# Patient Record
Sex: MALE | Race: WHITE | NOT HISPANIC OR LATINO | Employment: FULL TIME | URBAN - METROPOLITAN AREA
[De-identification: names, ages, dates, MRNs, and addresses within clinical notes are randomized per-mention and may not be internally consistent; named-entity substitution may affect disease eponyms.]

---

## 2017-02-01 ENCOUNTER — PATIENT MESSAGE (OUTPATIENT)
Dept: FAMILY MEDICINE | Facility: CLINIC | Age: 47
End: 2017-02-01

## 2017-02-01 ENCOUNTER — LAB VISIT (OUTPATIENT)
Dept: LAB | Facility: HOSPITAL | Age: 47
End: 2017-02-01
Attending: NURSE PRACTITIONER
Payer: COMMERCIAL

## 2017-02-01 ENCOUNTER — OFFICE VISIT (OUTPATIENT)
Dept: FAMILY MEDICINE | Facility: CLINIC | Age: 47
End: 2017-02-01
Payer: COMMERCIAL

## 2017-02-01 VITALS
TEMPERATURE: 99 F | OXYGEN SATURATION: 98 % | DIASTOLIC BLOOD PRESSURE: 78 MMHG | WEIGHT: 259.25 LBS | SYSTOLIC BLOOD PRESSURE: 122 MMHG | HEART RATE: 91 BPM | BODY MASS INDEX: 35.11 KG/M2 | HEIGHT: 72 IN

## 2017-02-01 DIAGNOSIS — Z00.00 ANNUAL PHYSICAL EXAM: Primary | ICD-10-CM

## 2017-02-01 DIAGNOSIS — R59.0 SUBMANDIBULAR LYMPHADENOPATHY: ICD-10-CM

## 2017-02-01 DIAGNOSIS — R91.1 LUNG NODULE: ICD-10-CM

## 2017-02-01 DIAGNOSIS — Z00.00 ANNUAL PHYSICAL EXAM: ICD-10-CM

## 2017-02-01 DIAGNOSIS — Z23 NEED FOR TDAP VACCINATION: ICD-10-CM

## 2017-02-01 DIAGNOSIS — R91.8 ABNORMAL CT SCAN, LUNG: Primary | ICD-10-CM

## 2017-02-01 LAB
ALBUMIN SERPL BCP-MCNC: 4.3 G/DL
ALP SERPL-CCNC: 60 IU/L
ALT SERPL W/O P-5'-P-CCNC: 39 IU/L
ANION GAP SERPL CALC-SCNC: 10 MMOL/L
AST SERPL-CCNC: 42 IU/L
BASOPHILS # BLD AUTO: 0.1 K/UL
BASOPHILS NFR BLD: 1.6 %
BILIRUB SERPL-MCNC: 0.9 MG/DL
BUN SERPL-MCNC: 26 MG/DL
CALCIUM SERPL-MCNC: 9.3 MG/DL
CHLORIDE SERPL-SCNC: 105 MMOL/L
CHOLEST/HDLC SERPL: 3.4 {RATIO}
CO2 SERPL-SCNC: 27 MMOL/L
CREAT SERPL-MCNC: 0.86 MG/DL
DIFFERENTIAL METHOD: NORMAL
EOSINOPHIL # BLD AUTO: 0.2 K/UL
EOSINOPHIL NFR BLD: 2.8 %
ERYTHROCYTE [DISTWIDTH] IN BLOOD BY AUTOMATED COUNT: 12.9 %
EST. GFR  (AFRICAN AMERICAN): >60 ML/MIN/1.73 M^2
EST. GFR  (NON AFRICAN AMERICAN): >60 ML/MIN/1.73 M^2
GLUCOSE SERPL-MCNC: 99 MG/DL
HCT VFR BLD AUTO: 44.8 %
HDL/CHOLESTEROL RATIO: 29.3 %
HDLC SERPL-MCNC: 164 MG/DL
HDLC SERPL-MCNC: 48 MG/DL
HGB BLD-MCNC: 15 G/DL
LDLC SERPL CALC-MCNC: 98.6 MG/DL
LYMPHOCYTES # BLD AUTO: 2.1 K/UL
LYMPHOCYTES NFR BLD: 33.8 %
MCH RBC QN AUTO: 29.4 PG
MCHC RBC AUTO-ENTMCNC: 33.5 %
MCV RBC AUTO: 88 FL
MONOCYTES # BLD AUTO: 0.9 K/UL
MONOCYTES NFR BLD: 14.4 %
NEUTROPHILS # BLD AUTO: 3 K/UL
NEUTROPHILS NFR BLD: 47.1 %
NONHDLC SERPL-MCNC: 116 MG/DL
PLATELET # BLD AUTO: 325 K/UL
PMV BLD AUTO: 9.3 FL
POTASSIUM SERPL-SCNC: 4.2 MMOL/L
PROT SERPL-MCNC: 7.7 G/DL
RBC # BLD AUTO: 5.1 M/UL
SODIUM SERPL-SCNC: 142 MMOL/L
TRIGL SERPL-MCNC: 87 MG/DL
TSH SERPL DL<=0.005 MIU/L-ACNC: 1.29 UIU/ML
WBC # BLD AUTO: 6.34 K/UL

## 2017-02-01 PROCEDURE — 36415 COLL VENOUS BLD VENIPUNCTURE: CPT | Mod: PO

## 2017-02-01 PROCEDURE — 80061 LIPID PANEL: CPT

## 2017-02-01 PROCEDURE — 90715 TDAP VACCINE 7 YRS/> IM: CPT | Mod: S$GLB,,, | Performed by: NURSE PRACTITIONER

## 2017-02-01 PROCEDURE — 84443 ASSAY THYROID STIM HORMONE: CPT

## 2017-02-01 PROCEDURE — 90471 IMMUNIZATION ADMIN: CPT | Mod: S$GLB,,, | Performed by: NURSE PRACTITIONER

## 2017-02-01 PROCEDURE — 85025 COMPLETE CBC W/AUTO DIFF WBC: CPT | Mod: PO

## 2017-02-01 PROCEDURE — 80053 COMPREHEN METABOLIC PANEL: CPT | Mod: PO

## 2017-02-01 PROCEDURE — 99203 OFFICE O/P NEW LOW 30 MIN: CPT | Mod: 25,S$GLB,, | Performed by: NURSE PRACTITIONER

## 2017-02-01 RX ORDER — PHENTERMINE HYDROCHLORIDE 37.5 MG/1
TABLET ORAL
Refills: 2 | COMMUNITY
Start: 2017-01-15 | End: 2017-03-15

## 2017-02-01 RX ORDER — PREDNISONE 20 MG/1
TABLET ORAL
Refills: 0 | COMMUNITY
Start: 2016-11-28 | End: 2017-02-01 | Stop reason: ALTCHOICE

## 2017-02-01 RX ORDER — NAPROXEN 500 MG/1
500 TABLET ORAL 2 TIMES DAILY PRN
Refills: 2 | COMMUNITY
Start: 2017-01-15 | End: 2017-09-25

## 2017-02-01 RX ORDER — SILDENAFIL CITRATE 100 MG/1
100 TABLET, FILM COATED ORAL
Refills: 2 | COMMUNITY
Start: 2017-01-15 | End: 2018-08-23

## 2017-02-01 RX ORDER — OXYCODONE AND ACETAMINOPHEN 5; 325 MG/1; MG/1
TABLET ORAL
Refills: 0 | COMMUNITY
Start: 2016-11-28 | End: 2017-03-13 | Stop reason: SDUPTHER

## 2017-02-01 NOTE — PROGRESS NOTES
Subjective:       Patient ID: Frankie Hoyt is a 46 y.o. male.    Chief Complaint: Annual Exam    HPI Comments: Pt presents to the clinic for annual exam. States he is doing this because his insurance said he has to. He is currently taking Adipex to help him lose weight. He monitors his BP some what at home. He has been on it since dec 2016 and he has dropped 15lbs. He is also working out more then he was before. He is also concerned about some swelling he has on the left side of his neck. States that it has been there for a year. Reports that it does it bigger at times. Non tender. Does report history of chewing tobacco.    Review of Systems   Constitutional: Negative.    HENT: Negative.    Eyes: Negative.    Respiratory: Negative.    Cardiovascular: Negative.    Gastrointestinal: Negative.    Endocrine: Negative.    Genitourinary: Negative.    Musculoskeletal: Negative.    Skin: Negative.    Neurological: Negative.    Hematological: Positive for adenopathy.        Swelling to left side of neck     Psychiatric/Behavioral: Negative.        Objective:      Physical Exam   Constitutional: He is oriented to person, place, and time. He appears well-developed and well-nourished. No distress.   HENT:   Head: Normocephalic.   Right Ear: Tympanic membrane and external ear normal.   Left Ear: Tympanic membrane and external ear normal.   Nose: No mucosal edema or rhinorrhea. Right sinus exhibits no maxillary sinus tenderness and no frontal sinus tenderness. Left sinus exhibits no maxillary sinus tenderness and no frontal sinus tenderness.   Mouth/Throat: No oropharyngeal exudate, posterior oropharyngeal edema or posterior oropharyngeal erythema.   Eyes: Pupils are equal, round, and reactive to light.   Neck: Normal range of motion.   Cardiovascular: Normal rate, regular rhythm and normal heart sounds.    Pulmonary/Chest: Effort normal and breath sounds normal. No respiratory distress.   Abdominal: Soft. Bowel sounds are  normal. He exhibits no distension. There is no tenderness.   Musculoskeletal: Normal range of motion. He exhibits no edema.   Lymphadenopathy:        Head (left side): Submandibular adenopathy present.   Swelling to left submandibular gland non tender   Neurological: He is alert and oriented to person, place, and time.   Skin: Skin is warm and dry. He is not diaphoretic.   Psychiatric: He has a normal mood and affect. His behavior is normal. Judgment and thought content normal.       Assessment:       1. Annual physical exam    2. Need for Tdap vaccination    3. Submandibular lymphadenopathy        Plan:       Annual physical exam  -     CBC auto differential; Future  -     Comprehensive metabolic panel; Future  -     Lipid panel; Future  -     TSH; Future  -     Urinalysis; Future    Need for Tdap vaccination  -     Tdap Vaccine (Adult)    Submandibular lymphadenopathy  -     CT Soft Tissue Neck W WO Contrast; Future    CT scheduled for Friday  Patient declines flu shot

## 2017-02-03 ENCOUNTER — TELEPHONE (OUTPATIENT)
Dept: FAMILY MEDICINE | Facility: CLINIC | Age: 47
End: 2017-02-03

## 2017-02-03 ENCOUNTER — HOSPITAL ENCOUNTER (OUTPATIENT)
Dept: RADIOLOGY | Facility: HOSPITAL | Age: 47
Discharge: HOME | End: 2017-02-03
Attending: NURSE PRACTITIONER
Payer: COMMERCIAL

## 2017-02-03 DIAGNOSIS — R59.0 SUBMANDIBULAR LYMPHADENOPATHY: ICD-10-CM

## 2017-02-03 DIAGNOSIS — R91.8 ABNORMAL CT SCAN, LUNG: ICD-10-CM

## 2017-02-03 DIAGNOSIS — R91.1 LUNG NODULE: ICD-10-CM

## 2017-02-03 PROCEDURE — 70491 CT SOFT TISSUE NECK W/DYE: CPT | Mod: TC,PO

## 2017-02-03 PROCEDURE — 71260 CT THORAX DX C+: CPT | Mod: TC,PO

## 2017-02-03 PROCEDURE — 25500020 PHARM REV CODE 255: Mod: PO | Performed by: NURSE PRACTITIONER

## 2017-02-03 RX ADMIN — IOHEXOL 50 ML: 350 INJECTION, SOLUTION INTRAVENOUS at 01:02

## 2017-02-06 ENCOUNTER — PATIENT MESSAGE (OUTPATIENT)
Dept: FAMILY MEDICINE | Facility: CLINIC | Age: 47
End: 2017-02-06

## 2017-02-06 ENCOUNTER — TELEPHONE (OUTPATIENT)
Dept: FAMILY MEDICINE | Facility: CLINIC | Age: 47
End: 2017-02-06

## 2017-02-06 NOTE — TELEPHONE ENCOUNTER
Spoke with Dr Law he states that the 4mm nodule is the same nodule as the 5mm found on this CT scan he recommends repeating the CT in 2 years. Spoke with patient let him know what the radiologist said. Repeat in 2 years.

## 2017-02-06 NOTE — TELEPHONE ENCOUNTER
Called radiology spoke with Rupert stanton that Dr. Law is at the hospital asked that he please compare the CT from 2012 to the one done last week and let me know

## 2017-03-06 ENCOUNTER — TELEPHONE (OUTPATIENT)
Dept: FAMILY MEDICINE | Facility: CLINIC | Age: 47
End: 2017-03-06

## 2017-03-06 RX ORDER — AMOXICILLIN AND CLAVULANATE POTASSIUM 875; 125 MG/1; MG/1
1 TABLET, FILM COATED ORAL EVERY 12 HOURS
Qty: 20 TABLET | Refills: 0 | Status: SHIPPED | OUTPATIENT
Start: 2017-03-06 | End: 2017-09-25

## 2017-03-06 NOTE — TELEPHONE ENCOUNTER
Spoke with patient states he is having some discomfort in his lower abd. States it kind of feels like when he had diverticulitis in the past he is having diarrhea the pain is relieved once he has a BM but then returns. Let him know I am sending Augmentin to the pharmacy if the pain worsens needs to come in or go to the ED

## 2017-03-06 NOTE — TELEPHONE ENCOUNTER
----- Message from Sherry Lagos sent at 3/6/2017  1:30 PM CST -----  Patient requesting a returned phone call to discuss lower abdominal pain that he is experiencing. Started this am; not taking any OTC medication. No fever, but not severe enough to go to ER. Says it has similar feeling of diverticulitis.

## 2017-03-13 ENCOUNTER — TELEPHONE (OUTPATIENT)
Dept: UROLOGY | Facility: CLINIC | Age: 47
End: 2017-03-13

## 2017-03-13 ENCOUNTER — PATIENT MESSAGE (OUTPATIENT)
Dept: FAMILY MEDICINE | Facility: CLINIC | Age: 47
End: 2017-03-13

## 2017-03-13 DIAGNOSIS — K57.92 DIVERTICULITIS OF INTESTINE WITHOUT PERFORATION OR ABSCESS WITHOUT BLEEDING, UNSPECIFIED PART OF INTESTINAL TRACT: Primary | ICD-10-CM

## 2017-03-13 RX ORDER — OXYCODONE AND ACETAMINOPHEN 5; 325 MG/1; MG/1
1 TABLET ORAL EVERY 6 HOURS PRN
Qty: 15 TABLET | Refills: 0 | Status: SHIPPED | OUTPATIENT
Start: 2017-03-13 | End: 2018-08-23

## 2017-03-13 NOTE — TELEPHONE ENCOUNTER
----- Message from Kelly Crow sent at 3/13/2017 10:18 AM CDT -----  Contact: [phone]/ self   Pt its requesting an appointment for this week, states he is having a problem that need treatment right away. Please advise

## 2017-03-15 ENCOUNTER — LAB VISIT (OUTPATIENT)
Dept: LAB | Facility: HOSPITAL | Age: 47
End: 2017-03-15
Attending: INTERNAL MEDICINE
Payer: COMMERCIAL

## 2017-03-15 ENCOUNTER — OFFICE VISIT (OUTPATIENT)
Dept: GASTROENTEROLOGY | Facility: CLINIC | Age: 47
End: 2017-03-15
Payer: COMMERCIAL

## 2017-03-15 VITALS
DIASTOLIC BLOOD PRESSURE: 85 MMHG | WEIGHT: 256.81 LBS | SYSTOLIC BLOOD PRESSURE: 124 MMHG | BODY MASS INDEX: 34.78 KG/M2 | HEIGHT: 72 IN | HEART RATE: 72 BPM

## 2017-03-15 DIAGNOSIS — R10.32 ABDOMINAL PAIN, LLQ (LEFT LOWER QUADRANT): ICD-10-CM

## 2017-03-15 DIAGNOSIS — Z80.0 FAMILY HISTORY OF COLON CANCER REQUIRING SCREENING COLONOSCOPY: Chronic | ICD-10-CM

## 2017-03-15 DIAGNOSIS — K57.32 DIVERTICULITIS OF SIGMOID COLON: Primary | Chronic | ICD-10-CM

## 2017-03-15 DIAGNOSIS — K57.32 DIVERTICULITIS OF SIGMOID COLON: Chronic | ICD-10-CM

## 2017-03-15 LAB
ALBUMIN SERPL BCP-MCNC: 3.6 G/DL
ALP SERPL-CCNC: 68 U/L
ALT SERPL W/O P-5'-P-CCNC: 24 U/L
ANION GAP SERPL CALC-SCNC: 11 MMOL/L
AST SERPL-CCNC: 21 U/L
BASOPHILS # BLD AUTO: 0.08 K/UL
BASOPHILS NFR BLD: 1.1 %
BILIRUB SERPL-MCNC: 0.5 MG/DL
BUN SERPL-MCNC: 18 MG/DL
CALCIUM SERPL-MCNC: 9.8 MG/DL
CHLORIDE SERPL-SCNC: 101 MMOL/L
CO2 SERPL-SCNC: 28 MMOL/L
CREAT SERPL-MCNC: 1 MG/DL
DIFFERENTIAL METHOD: ABNORMAL
EOSINOPHIL # BLD AUTO: 0.2 K/UL
EOSINOPHIL NFR BLD: 2.5 %
ERYTHROCYTE [DISTWIDTH] IN BLOOD BY AUTOMATED COUNT: 12.2 %
EST. GFR  (AFRICAN AMERICAN): >60 ML/MIN/1.73 M^2
EST. GFR  (NON AFRICAN AMERICAN): >60 ML/MIN/1.73 M^2
GLUCOSE SERPL-MCNC: 98 MG/DL
HCT VFR BLD AUTO: 45.7 %
HGB BLD-MCNC: 15.4 G/DL
LYMPHOCYTES # BLD AUTO: 2.1 K/UL
LYMPHOCYTES NFR BLD: 28.8 %
MCH RBC QN AUTO: 30 PG
MCHC RBC AUTO-ENTMCNC: 33.7 %
MCV RBC AUTO: 89 FL
MONOCYTES # BLD AUTO: 0.8 K/UL
MONOCYTES NFR BLD: 11 %
NEUTROPHILS # BLD AUTO: 4 K/UL
NEUTROPHILS NFR BLD: 55.2 %
PLATELET # BLD AUTO: 374 K/UL
PMV BLD AUTO: 9.3 FL
POTASSIUM SERPL-SCNC: 4.5 MMOL/L
PROT SERPL-MCNC: 8 G/DL
RBC # BLD AUTO: 5.13 M/UL
SODIUM SERPL-SCNC: 140 MMOL/L
WBC # BLD AUTO: 7.3 K/UL

## 2017-03-15 PROCEDURE — 80053 COMPREHEN METABOLIC PANEL: CPT

## 2017-03-15 PROCEDURE — 1160F RVW MEDS BY RX/DR IN RCRD: CPT | Mod: S$GLB,,, | Performed by: INTERNAL MEDICINE

## 2017-03-15 PROCEDURE — 99205 OFFICE O/P NEW HI 60 MIN: CPT | Mod: S$GLB,,, | Performed by: INTERNAL MEDICINE

## 2017-03-15 PROCEDURE — 36415 COLL VENOUS BLD VENIPUNCTURE: CPT

## 2017-03-15 PROCEDURE — 99999 PR PBB SHADOW E&M-EST. PATIENT-LVL III: CPT | Mod: PBBFAC,,, | Performed by: INTERNAL MEDICINE

## 2017-03-15 PROCEDURE — 85025 COMPLETE CBC W/AUTO DIFF WBC: CPT

## 2017-03-15 NOTE — MR AVS SNAPSHOT
[facility]  [address]  [address]  [place] [address]  Phone: [phone]                  Frankie Hoyt   3/15/2017 9:40 AM   Office Visit    Description:  Male : 1970   Provider:  Del Fan MD   Department:  [place] [facility]           Reason for Visit     Diverticulitis           Diagnoses this Visit        Comments    Diverticulitis of sigmoid colon    -  Primary Initial episode occurred 2012    Abdominal pain, LLQ (left lower quadrant)         Family history of colon cancer requiring screening colonoscopy     Strong family history of colon cancer as well as colon polyps; and colonoscopy done 2013; follow-up needed 2018           To Do List           Future Appointments        Provider Department Dept Phone    3/15/2017 11:20 AM APPOINTMENT LAB, [facility] [phone]    3/15/2017 11:30 AM APPOINTMENT LAB, [place] [facility] [phone]    3/17/2017 2:00 PM [facility] CT1 LIMIT 400 LBS [facility] [phone]      Goals (5 Years of Data)     None      [facility]     [facility] Care [facility] -  Assistance  Registered nurses in the [facility] provide clinical advisement, health education, appointment booking, and other advisory services.  Call for this free service at [phone].             Medications           Message regarding Medications     Verify the changes and/or additions to your medication regime listed below are the same as discussed with your clinician today.  If any of these changes or additions are incorrect, please notify your healthcare provider.        STOP taking these medications     phentermine (ADIPEX-P) 37.5 mg tablet TAKE 1 TABLET BY MOUTH EVERY DAY FOR 1 WEEK. THEN TAKE 2 TABLETS BY MOUTH EVERY DAY           Verify that the below list of medications is an accurate representation of the  medications you are currently taking.  If none reported, the list may be blank. If incorrect, please contact your healthcare provider. Carry this list with you in case of emergency.           Current Medications     amoxicillin-clavulanate 875-125mg (AUGMENTIN) 875-125 mg per tablet Take 1 tablet by mouth every 12 (twelve) hours.    naproxen (NAPROSYN) 500 MG tablet Take 500 mg by mouth 2 (two) times daily as needed.    oxycodone-acetaminophen (PERCOCET) 5-325 mg per tablet Take 1 tablet by mouth every 6 (six) hours as needed for Pain.    VIAGRA 100 mg tablet Take 100 mg by mouth as needed.           Clinical Reference Information           Your Vitals Were     BP Pulse Height Weight BMI    124/85 72 6' (1.829 m) 116.5 kg (256 lb 13.4 oz) 34.83 kg/m2      Blood Pressure          Most Recent Value    BP  124/85      Allergies as of 3/15/2017     No Known Allergies      Immunizations Administered on Date of Encounter - 3/15/2017     None      Orders Placed During Today's Visit     Future Labs/Procedures Expected by Expires    C-reactive protein  3/15/2017 5/14/2018    CBC auto differential  3/15/2017 3/15/2018    Comprehensive metabolic panel  3/15/2017 3/15/2018    CT Abdomen Pelvis W Wo Contrast  3/15/2017 3/16/2018      Instructions    -CT of the abdomen and pelvis with and without IV contrast  -Complete the prescribed course of Augmentin  -Blood work to be done today  -Patient will be due for a follow-up colonoscopy in June 2018 based on a fairly impressive family history of colon polyps and colon cancer         Smoking Cessation     If you would like to quit smoking:   You may be eligible for free services if you are a [place] resident and started smoking cigarettes before September 1, 1988.  Call the Smoking Cessation [employer] (SCT) toll free at (779) 731-2773 or ([phone]) 516-2434.   Call 9-800-QUIT-NOW if you do not meet the above criteria.            Language Assistance Services     ATTENTION: Language  assistance services are available, free of charge. Please call [phone].      ATENCIÓN: Si habla marly, tiene a woodward disposición servicios gratuitos de asistencia lingüística. Llame al [phone].     CHÚ Ý: N?u b?n nói Ti?ng Vi?t, có các d?ch v? h? tr? ngôn ng? mi?n phí dành cho b?n. G?i s? [phone].         [facility] complies with applicable Federal civil rights laws and does not discriminate on the basis of race, color, national origin, age, disability, or sex.

## 2017-03-15 NOTE — LETTER
March 15, 2017      Natasha Wilkins, NP  [address] [address]           [place] [facility]  [address]  [address]  [place] [address]            Patient: Frankie Hoyt   MR Number: [id number]   YOB: 1970   Date of Visit: 3/15/2017       Dear Natasha Wilkins:    Thank you for referring Frankie Hoyt to me for evaluation. Attached you will find relevant portions of my assessment and plan of care.    If you have questions, please do not hesitate to call me. I look forward to following Frankie Hoyt along with you.    Sincerely,    Del Fan MD, FACP, FACG, AGAF [facility] [place] [facility]  Cell: [phone]  [address], [place] [address]  Phone: [phone] Fax: [phone]    [address], [address], [place] [address]  Phone: [phone] Fax: [phone]    Enclosure  CC:  No Recipients    If you would like to receive this communication electronically, please contact [email or web address] or [phone] to request more information on [employer] Link access.    For providers and/or their staff who would like to refer a patient to [facility], please contact us through our one-stop-shop provider referral line, [facility] , at [phone].    If you feel you have received this communication in error or would no longer like to receive these types of communications, please e-mail [email or web address]

## 2017-03-15 NOTE — PATIENT INSTRUCTIONS
-CT of the abdomen and pelvis with and without IV contrast  -Complete the prescribed course of Augmentin  -Blood work to be done today  -Patient will be due for a follow-up colonoscopy in June 2018 based on a fairly impressive family history of colon polyps and colon cancer

## 2017-03-15 NOTE — PROGRESS NOTES
[place] [facility]  History & Physical / New Patient  [facility]      SUBJECTIVE:     PCP: Natasha Wilkins NP    Chief Complaint/Reason for Admission: Diverticulitis (Diagnosed Dec 2012 [facility])      History of Present Illness:  Patient is a 46 y.o. male presents with a history of having had a bout of diverticulitis diagnosed in December 2012.  He was hospitalized partially 5 days for that at [facility].  Subsequently, the patient did well, and the initial bout result with bed rest and antibiotics only.  There was no evidence of abscess found at the time of that evaluation.  Approximate 10 days ago, the patient had onset of very similar lower abdominal pains across lower abdomen, and he is placed on Augmentin therapy by his primary care physician.  After 3 or 4 days of antibiotic, the patient's pains were almost gone, and, after eating a steak meal he began have recurrent abdominal pains approximately 3 days ago.  This morning, the patient reports no bowel pain/abdominal pain whatsoever.  The pains once that was mainly associated with onset after bowel movements.  He denies associated bright blood per rectum or melena, also reports no nausea, vomiting, or fever.    The patient indicates that he, during this episode of abdominal pain, has used an old prescription of Percocet for pain management as well.  He has approximately 4 more doses of the Augmentin to complete of the 14 day course.  I have no access to any lab data obtained since onset of symptoms.  The available labs shown below occurred in February of this year and are unremarkable.    I did review with the patient the results of his 17 June 2013 colonoscopy evaluation, performed as a screening maneuver cousin a family history of colon polyps and colon cancer.  The procedure did not even document significant amount of diverticulosis.  We discussed the need for a follow-up colonoscopy in June 2018.    Accompanied by: No one  .    Outpatient Medications Prior to Visit   Medication Sig Dispense Refill    amoxicillin-clavulanate 875-125mg (AUGMENTIN) 875-125 mg per tablet Take 1 tablet by mouth every 12 (twelve) hours. 20 tablet 0    naproxen (NAPROSYN) 500 MG tablet Take 500 mg by mouth 2 (two) times daily as needed.  2    oxycodone-acetaminophen (PERCOCET) 5-325 mg per tablet Take 1 tablet by mouth every 6 (six) hours as needed for Pain. 15 tablet 0    VIAGRA 100 mg tablet Take 100 mg by mouth as needed.  2    phentermine (ADIPEX-P) 37.5 mg tablet TAKE 1 TABLET BY MOUTH EVERY DAY FOR 1 WEEK. THEN TAKE 2 TABLETS BY MOUTH EVERY DAY  2     No facility-administered medications prior to visit.        Review of patient's allergies indicates:  No Known Allergies     Past Medical History:   Diagnosis Date    Abdominal pain, LLQ (left lower quadrant) 12/2012    Diverticular disease 2012     History reviewed. No pertinent surgical history.  Family History   Problem Relation Age of Onset    Cancer Father      colon and prostate cancer     Social History   Substance Use Topics    Smoking status: Current Some Day Smoker     Types: Cigars    Smokeless tobacco: None    Alcohol use Yes       Review of Systems:  Review of Systems   Constitutional: Negative for appetite change, chills, diaphoresis, fatigue, fever and unexpected weight change.   HENT: Negative for postnasal drip, sore throat, trouble swallowing and voice change.    Eyes: Negative for visual disturbance.   Respiratory: Negative for cough, choking, chest tightness, shortness of breath and wheezing.    Cardiovascular: Negative for chest pain and leg swelling.   Gastrointestinal: Positive for abdominal pain (see history of present illness). Negative for abdominal distention, anal bleeding, blood in stool, constipation, diarrhea, nausea, rectal pain and vomiting.   Endocrine: Negative for cold intolerance, heat intolerance and polyuria.   Genitourinary: Negative for difficulty  urinating.   Musculoskeletal: Negative for arthralgias, back pain, gait problem and joint swelling.   Skin: Negative.    Allergic/Immunologic: Negative for food allergies.   Neurological: Negative for dizziness, seizures, speech difficulty and headaches.   Hematological: Does not bruise/bleed easily.   Psychiatric/Behavioral: Negative.          OBJECTIVE:     Vital Signs (Most Recent):  /85  Pulse 72  Ht 6' (1.829 m)  Wt 116.5 kg (256 lb 13.4 oz)  BMI 34.83 kg/m2    Physical Exam:  Physical Exam   Constitutional: He is oriented to person, place, and time. He appears well-developed and well-nourished.   HENT:   Head: Normocephalic.   Eyes: EOM are normal. Pupils are equal, round, and reactive to light. No scleral icterus.   Neck: No JVD present. No tracheal deviation present.   Cardiovascular: Normal rate, regular rhythm and normal heart sounds.  Exam reveals no gallop and no friction rub.    No murmur heard.  Pulmonary/Chest: Effort normal and breath sounds normal. He has no wheezes. He has no rales. He exhibits no tenderness.   Abdominal: Soft. Normal appearance and bowel sounds are normal. He exhibits no distension, no pulsatile liver, no abdominal bruit, no pulsatile midline mass and no mass. There is no hepatosplenomegaly. There is no tenderness. There is no rebound and no guarding. No hernia.   Mildly obese, nontender throughout examined area   Musculoskeletal: Normal range of motion. He exhibits no edema.   Lymphadenopathy:     He has no cervical adenopathy.   Neurological: He is alert and oriented to person, place, and time. No cranial nerve deficit. He exhibits normal muscle tone.   Skin: Skin is warm and dry. No rash noted.   Psychiatric: He has a normal mood and affect. Thought content normal.       Laboratory:  Complete Blood Count  Lab Results   Component Value Date    RBC 5.10 02/01/2017    HGB 15.0 02/01/2017    HCT 44.8 02/01/2017    MCV 88 02/01/2017    MCH 29.4 02/01/2017    MCHC 33.5  02/01/2017    RDW 12.9 02/01/2017     02/01/2017    MPV 9.3 02/01/2017    GRAN 3.0 02/01/2017    GRAN 47.1 02/01/2017    LYMPH 2.1 02/01/2017    LYMPH 33.8 02/01/2017    MONO 0.9 02/01/2017    MONO 14.4 02/01/2017    EOS 0.2 02/01/2017    BASO 0.10 02/01/2017    EOSINOPHIL 2.8 02/01/2017    BASOPHIL 1.6 02/01/2017    DIFFMETHOD Automated 02/01/2017       Comprehensive Metabolic Panel  Lab Results   Component Value Date    GLU 99 02/01/2017    BUN 26 (H) 02/01/2017    CREATININE 0.86 02/01/2017     02/01/2017    K 4.2 02/01/2017     02/01/2017    PROT 7.7 02/01/2017    ALBUMIN 4.3 02/01/2017    BILITOT 0.9 02/01/2017    AST 42 02/01/2017    ALKPHOS 60 02/01/2017    CO2 27 02/01/2017    ALT 39 02/01/2017    ANIONGAP 10 02/01/2017    EGFRNONAA >60.0 02/01/2017    ESTGFRAFRICA >60.0 02/01/2017       TSH  Lab Results   Component Value Date    TSH 1.290 02/01/2017           Diagnostic Results:      ASSESSMENT/PLAN:     Frankie was seen today for diverticulitis.    Diagnoses and all orders for this visit:    Diverticulitis of sigmoid colon  Comments:  Initial episode occurred September 2012  Orders:  -     CT Abdomen Pelvis W Wo Contrast; Future  -     CBC auto differential; Future  -     Comprehensive metabolic panel; Future  -     C-reactive protein; Future    Abdominal pain, LLQ (left lower quadrant)  -     CT Abdomen Pelvis W Wo Contrast; Future  -     CBC auto differential; Future  -     Comprehensive metabolic panel; Future  -     C-reactive protein; Future    Family history of colon cancer requiring screening colonoscopy  Comments:  Strong family history of colon cancer as well as colon polyps; and colonoscopy done 17 June 2013; follow-up needed June 2018    Plan:   No Follow-up on file.    -CT of the abdomen and pelvis with and without IV contrast  -Complete the prescribed course of Augmentin  -Blood work to be done today  -Patient will be due for a follow-up colonoscopy in June 2018 based on a  fairly impressive family history of colon polyps and colon cancer        Del Fan MD, FACP, FACG, AGAF [facility]  [address], [address], [place] [address]  Phone: [phone] Fax: [phone] [address], [address], [place] [address]  Phone: [phone] Fax: [phone]

## 2017-03-17 ENCOUNTER — PATIENT MESSAGE (OUTPATIENT)
Dept: GASTROENTEROLOGY | Facility: CLINIC | Age: 47
End: 2017-03-17

## 2017-09-25 ENCOUNTER — OFFICE VISIT (OUTPATIENT)
Dept: FAMILY MEDICINE | Facility: CLINIC | Age: 47
End: 2017-09-25
Payer: COMMERCIAL

## 2017-09-25 ENCOUNTER — HOSPITAL ENCOUNTER (OUTPATIENT)
Dept: CARDIOLOGY | Facility: HOSPITAL | Age: 47
Discharge: HOME | End: 2017-09-25
Payer: COMMERCIAL

## 2017-09-25 VITALS
WEIGHT: 258.19 LBS | BODY MASS INDEX: 34.97 KG/M2 | OXYGEN SATURATION: 98 % | DIASTOLIC BLOOD PRESSURE: 78 MMHG | HEART RATE: 93 BPM | TEMPERATURE: 99 F | HEIGHT: 72 IN | SYSTOLIC BLOOD PRESSURE: 132 MMHG

## 2017-09-25 DIAGNOSIS — M25.473 ANKLE SWELLING, UNSPECIFIED LATERALITY: ICD-10-CM

## 2017-09-25 DIAGNOSIS — R07.9 CHEST PAIN, UNSPECIFIED TYPE: ICD-10-CM

## 2017-09-25 DIAGNOSIS — F41.9 ANXIETY: ICD-10-CM

## 2017-09-25 DIAGNOSIS — R07.9 CHEST PAIN, UNSPECIFIED TYPE: Primary | ICD-10-CM

## 2017-09-25 PROCEDURE — 93005 ELECTROCARDIOGRAM TRACING: CPT | Mod: PO

## 2017-09-25 PROCEDURE — 3008F BODY MASS INDEX DOCD: CPT | Mod: S$GLB,,, | Performed by: NURSE PRACTITIONER

## 2017-09-25 PROCEDURE — 93010 ELECTROCARDIOGRAM REPORT: CPT | Mod: ,,, | Performed by: INTERNAL MEDICINE

## 2017-09-25 PROCEDURE — 99213 OFFICE O/P EST LOW 20 MIN: CPT | Mod: S$GLB,,, | Performed by: NURSE PRACTITIONER

## 2017-09-25 RX ORDER — PHENTERMINE HYDROCHLORIDE 37.5 MG/1
TABLET ORAL
COMMUNITY
Start: 2017-08-13 | End: 2018-08-23

## 2017-09-25 NOTE — PROGRESS NOTES
Subjective:       Patient ID: Frankie Hoyt is a 47 y.o. male.    Chief Complaint: Sinusitis (X 1 Month ); Chest Pain (during deep breaths & exercising ); and Joint Swelling    Sinusitis   This is a new (about a month) problem. The current episode started 1 to 4 weeks ago (went to urgent care a month ago). The problem has been gradually improving since onset. There has been no fever. He is experiencing no pain. Associated symptoms include headaches. Pertinent negatives include no chills, congestion, coughing, diaphoresis, ear pain, hoarse voice, neck pain, shortness of breath, sinus pressure, sneezing, sore throat or swollen glands. Treatments tried: zpack and shot at urgent care. The treatment provided mild relief.   Chest Pain    This is a new (went to urgent care for the issue and they told him it could be cardiac this freaked patient out) problem. The current episode started 1 to 4 weeks ago. The onset quality is undetermined. The problem occurs intermittently. The problem has been unchanged. The pain is present in the substernal region. The pain is at a severity of 2/10. The pain is mild. The quality of the pain is described as burning. The pain does not radiate. Associated symptoms include headaches and palpitations. Pertinent negatives include no abdominal pain, back pain, claudication, cough, diaphoresis, dizziness, exertional chest pressure, fever, hemoptysis, irregular heartbeat, leg pain, lower extremity edema, malaise/fatigue, nausea, near-syncope, numbness, orthopnea, PND, shortness of breath, sputum production, syncope, vomiting or weakness. Associated with: exercise, drinking cold water. He has tried nothing for the symptoms. Risk factors: father had a heart attack at 60.   His past medical history is significant for anxiety/panic attacks.   His family medical history is significant for CAD, heart disease and early MI. Prior workup: states he saw a cariologist years ago ekg stress test and holter  monitor done was told he had an irregular heart beat.   Edema   This is a new problem. The current episode started in the past 7 days (noticed it a week ago-pitting edema bilateral feet). The problem occurs intermittently. The problem has been unchanged. Associated symptoms include chest pain, headaches and joint swelling. Pertinent negatives include no abdominal pain, anorexia, arthralgias, change in bowel habit, chills, congestion, coughing, diaphoresis, fatigue, fever, myalgias, nausea, neck pain, numbness, rash, sore throat, swollen glands, urinary symptoms, vertigo, visual change, vomiting or weakness. Exacerbated by: does report siting for a long peroid of time also taking 1600mg advil one day. He has tried nothing for the symptoms.     Review of Systems   Constitutional: Negative for activity change, chills, diaphoresis, fatigue, fever, malaise/fatigue and unexpected weight change.   HENT: Negative for congestion, ear pain, hearing loss, hoarse voice, rhinorrhea, sinus pressure, sneezing, sore throat and trouble swallowing.    Eyes: Negative for discharge and visual disturbance.   Respiratory: Positive for chest tightness. Negative for cough, hemoptysis, sputum production, shortness of breath and wheezing.    Cardiovascular: Positive for chest pain, palpitations and leg swelling. Negative for orthopnea, claudication, syncope, PND and near-syncope.   Gastrointestinal: Negative for abdominal pain, anorexia, blood in stool, change in bowel habit, constipation, diarrhea, nausea and vomiting.        Does have acid reflux at times     Endocrine: Negative for polydipsia and polyuria.   Genitourinary: Negative for difficulty urinating, hematuria and urgency.   Musculoskeletal: Positive for joint swelling. Negative for arthralgias, back pain, myalgias and neck pain.   Skin: Negative for rash.   Neurological: Positive for headaches. Negative for dizziness, vertigo, weakness and numbness.   Psychiatric/Behavioral:  Negative for confusion and dysphoric mood. The patient is nervous/anxious.        Objective:      Physical Exam   Constitutional: He is oriented to person, place, and time. He appears well-developed and well-nourished. No distress.   HENT:   Head: Normocephalic.   Right Ear: External ear normal.   Left Ear: External ear normal.   Nose: No mucosal edema or rhinorrhea. Right sinus exhibits no maxillary sinus tenderness and no frontal sinus tenderness. Left sinus exhibits no maxillary sinus tenderness and no frontal sinus tenderness.   Mouth/Throat: Posterior oropharyngeal erythema present. No oropharyngeal exudate or posterior oropharyngeal edema.   Cardiovascular: Normal rate, regular rhythm and normal heart sounds.    No murmur heard.  Pulmonary/Chest: Effort normal and breath sounds normal. No respiratory distress. He has no wheezes.   Musculoskeletal: He exhibits no edema.   Neurological: He is alert and oriented to person, place, and time.   Skin: Skin is warm and dry. No rash noted. He is not diaphoretic. No erythema. No pallor.   Psychiatric: His behavior is normal. Judgment and thought content normal. His mood appears anxious.   Vitals reviewed.      Assessment:       1. Chest pain, unspecified type    2. Ankle swelling, unspecified laterality    3. Anxiety        Plan:       Chest pain, unspecified type  -     EKG 12-lead; Future    Ankle swelling, unspecified laterality  -     Brain natriuretic peptide; Future    Anxiety      Patient would like to have cardiac testing done-chest pain sounds more like anxiety related

## 2017-09-26 ENCOUNTER — PATIENT MESSAGE (OUTPATIENT)
Dept: FAMILY MEDICINE | Facility: CLINIC | Age: 47
End: 2017-09-26

## 2017-09-26 ENCOUNTER — TELEPHONE (OUTPATIENT)
Dept: FAMILY MEDICINE | Facility: CLINIC | Age: 47
End: 2017-09-26

## 2017-09-26 DIAGNOSIS — R94.31 ABNORMAL EKG: Primary | ICD-10-CM

## 2017-09-26 NOTE — TELEPHONE ENCOUNTER
Talked to patient says he used to see Dr. Sweeney for cardiology in [place]. He is trying to get in touch with them to make an appt he is going to call me with their fax number so I can send them over a copy of the newest EKG

## 2018-08-22 ENCOUNTER — PATIENT MESSAGE (OUTPATIENT)
Dept: FAMILY MEDICINE | Facility: CLINIC | Age: 48
End: 2018-08-22

## 2018-08-23 ENCOUNTER — TELEPHONE (OUTPATIENT)
Dept: FAMILY MEDICINE | Facility: CLINIC | Age: 48
End: 2018-08-23

## 2018-08-23 ENCOUNTER — TELEPHONE (OUTPATIENT)
Dept: OTOLARYNGOLOGY | Facility: CLINIC | Age: 48
End: 2018-08-23

## 2018-08-23 ENCOUNTER — OFFICE VISIT (OUTPATIENT)
Dept: FAMILY MEDICINE | Facility: CLINIC | Age: 48
End: 2018-08-23
Payer: COMMERCIAL

## 2018-08-23 VITALS
TEMPERATURE: 99 F | BODY MASS INDEX: 35.94 KG/M2 | HEIGHT: 72 IN | DIASTOLIC BLOOD PRESSURE: 88 MMHG | SYSTOLIC BLOOD PRESSURE: 138 MMHG | OXYGEN SATURATION: 98 % | WEIGHT: 265.38 LBS | HEART RATE: 75 BPM

## 2018-08-23 DIAGNOSIS — N52.9 ERECTILE DYSFUNCTION, UNSPECIFIED ERECTILE DYSFUNCTION TYPE: ICD-10-CM

## 2018-08-23 DIAGNOSIS — M26.622 ARTHRALGIA OF LEFT TEMPOROMANDIBULAR JOINT: ICD-10-CM

## 2018-08-23 DIAGNOSIS — R59.0 SUBMANDIBULAR LYMPHADENOPATHY: Primary | ICD-10-CM

## 2018-08-23 PROCEDURE — 99213 OFFICE O/P EST LOW 20 MIN: CPT | Mod: S$GLB,,, | Performed by: NURSE PRACTITIONER

## 2018-08-23 PROCEDURE — 3008F BODY MASS INDEX DOCD: CPT | Mod: CPTII,S$GLB,, | Performed by: NURSE PRACTITIONER

## 2018-08-23 RX ORDER — SILDENAFIL 100 MG/1
100 TABLET, FILM COATED ORAL DAILY PRN
Qty: 6 TABLET | Refills: 1 | Status: SHIPPED | OUTPATIENT
Start: 2018-08-23 | End: 2018-11-16 | Stop reason: SDUPTHER

## 2018-08-23 NOTE — TELEPHONE ENCOUNTER
----- Message from Eliza Mcelroy sent at 8/23/2018  8:57 AM CDT -----  Contact: Natasha [phone] with [facility]   Natasha [phone] with [facility].   Tried to make an appointment she ask for something sooner then what came up. Please advise

## 2018-08-23 NOTE — TELEPHONE ENCOUNTER
Spoke with office of Natasha Wilkins, scheduled appointment for 9/5/18 at 8:20. The office will call patient to notify of date and time of appointment

## 2018-08-23 NOTE — PROGRESS NOTES
Answers for HPI/ROS submitted by the patient on 8/22/2018   activity change: No  unexpected weight change: No  neck pain: No  hearing loss: No  rhinorrhea: No  trouble swallowing: No  eye discharge: No  visual disturbance: No  chest tightness: No  wheezing: No  chest pain: No  palpitations: No  blood in stool: No  constipation: No  vomiting: No  diarrhea: No  polydipsia: No  polyuria: No  difficulty urinating: No  urgency: No  hematuria: No  joint swelling: Yes  arthralgias: Yes  headaches: No  weakness: No  confusion: No  dysphoric mood: No

## 2018-08-23 NOTE — PROGRESS NOTES
Subjective:       Patient ID: Frankie Hoyt is a 48 y.o. male.    Chief Complaint: swollen gland and jaw pain near left ear    Pt presents to the clinic today for left sided facial swelling and pain. He diego to  and was put on 10 days of Augmentin. Today is the 10th day and the swelling has not gone down. The swelling is near the TMJ joint. He has tenderness when he first bites in to something. Tenderness with palpation of the area. Has not been to the dentist in years. Does report grinding his teeth at night. Also notices himself clenching his jaw when he is mad or anxious. Also having submandibular swelling on the left side of his neck. This is an on going issue. I saw patient over a year ago for this. A CT scan was done which was normal. The swelling has not gone away. States sometimes its larger. Denies pain to that area. He does have a history of chewing tobacco use which he states he has stopped doing.        Review of Systems   Constitutional: Negative for activity change and unexpected weight change.   HENT: Negative for hearing loss, rhinorrhea and trouble swallowing.    Eyes: Negative for discharge and visual disturbance.   Respiratory: Negative for chest tightness and wheezing.    Cardiovascular: Negative for chest pain and palpitations.   Gastrointestinal: Negative for blood in stool, constipation, diarrhea and vomiting.   Endocrine: Negative for polydipsia and polyuria.   Genitourinary: Negative for difficulty urinating, hematuria and urgency.        History of ED     Musculoskeletal: Positive for arthralgias and joint swelling. Negative for neck pain.   Neurological: Negative for weakness and headaches.   Hematological:        Left sided facial swelling-submandibular swelling     Psychiatric/Behavioral: Negative for confusion and dysphoric mood.       Objective:      Physical Exam   Constitutional: He is oriented to person, place, and time. He appears well-developed and well-nourished. No distress.    HENT:   Right Ear: Tympanic membrane and external ear normal.   Left Ear: Tympanic membrane and external ear normal.   Nose: Mucosal edema and rhinorrhea present.   Mouth/Throat: No dental caries. No oropharyngeal exudate, posterior oropharyngeal edema or posterior oropharyngeal erythema.   Neck:       Cardiovascular: Normal rate, regular rhythm and normal heart sounds. Exam reveals no friction rub.   No murmur heard.  Pulmonary/Chest: Effort normal and breath sounds normal. No stridor. No respiratory distress.   Lymphadenopathy:        Head (left side): Submandibular adenopathy present.   Neurological: He is alert and oriented to person, place, and time.   Skin: Skin is warm and dry. He is not diaphoretic.   Psychiatric: He has a normal mood and affect. His behavior is normal. Judgment and thought content normal.   Vitals reviewed.      Assessment:       1. Submandibular lymphadenopathy    2. Erectile dysfunction, unspecified erectile dysfunction type    3. Arthralgia of left temporomandibular joint        Plan:       Submandibular lymphadenopathy  -     Ambulatory referral to ENT    Erectile dysfunction, unspecified erectile dysfunction type    Arthralgia of left temporomandibular joint    Other orders  -     sildenafil (VIAGRA) 100 MG tablet; Take 1 tablet (100 mg total) by mouth daily as needed for Erectile Dysfunction.  Dispense: 6 tablet; Refill: 1      Dr Levi assessed patient as well agrees with plan of care  Recommend seeing the dentist

## 2018-08-23 NOTE — TELEPHONE ENCOUNTER
Called the  about making an appt with ENT for a patient she states the soonest was Oct ask that the nurse please call me about patient coming in on a earlier date

## 2018-09-05 ENCOUNTER — OFFICE VISIT (OUTPATIENT)
Dept: OTOLARYNGOLOGY | Facility: CLINIC | Age: 48
End: 2018-09-05
Payer: COMMERCIAL

## 2018-09-05 VITALS
DIASTOLIC BLOOD PRESSURE: 81 MMHG | BODY MASS INDEX: 35.68 KG/M2 | HEIGHT: 72 IN | WEIGHT: 263.44 LBS | SYSTOLIC BLOOD PRESSURE: 136 MMHG | HEART RATE: 88 BPM

## 2018-09-05 DIAGNOSIS — J34.2 NASAL SEPTAL DEVIATION: ICD-10-CM

## 2018-09-05 DIAGNOSIS — J31.0 CHRONIC RHINITIS: ICD-10-CM

## 2018-09-05 DIAGNOSIS — R22.1 NECK MASS: Primary | ICD-10-CM

## 2018-09-05 PROCEDURE — 99244 OFF/OP CNSLTJ NEW/EST MOD 40: CPT | Mod: 25,S$GLB,, | Performed by: OTOLARYNGOLOGY

## 2018-09-05 PROCEDURE — 31575 DIAGNOSTIC LARYNGOSCOPY: CPT | Mod: S$GLB,,, | Performed by: OTOLARYNGOLOGY

## 2018-09-05 PROCEDURE — 99999 PR PBB SHADOW E&M-EST. PATIENT-LVL III: CPT | Mod: PBBFAC,,, | Performed by: OTOLARYNGOLOGY

## 2018-09-05 RX ORDER — MUPIROCIN 20 MG/G
OINTMENT TOPICAL 2 TIMES DAILY
Qty: 15 G | Refills: 0 | Status: SHIPPED | OUTPATIENT
Start: 2018-09-05 | End: 2018-09-15

## 2018-09-05 NOTE — LETTER
September 5, 2018      Natasha Wilkins, TAE  [address] [address]           [facility]  [address] [place] [address], [address]  [facility] [address]  Phone: [phone]  Fax: [phone]          Patient: Frankie Hoyt   MR Number: [id number]   YOB: 1970   Date of Visit: 9/5/2018       Dear Natasha Wilkins:    Thank you for referring Frankie Hoyt to me for evaluation. Attached you will find relevant portions of my assessment and plan of care.    If you have questions, please do not hesitate to call me. I look forward to following Frankie Hoyt along with you.    Sincerely,    Geraldine Crespo MD    Enclosure  CC:  No Recipients    If you would like to receive this communication electronically, please contact [email or web address] or [phone] to request more information on [facility] access.    For providers and/or their staff who would like to refer a patient to [facility], please contact us through our one-stop-shop provider referral line, [facility], at [phone].    If you feel you have received this communication in error or would no longer like to receive these types of communications, please e-mail [email or web address]

## 2018-09-05 NOTE — PROGRESS NOTES
"CC: left neck swelling.     Subjective:      Frankie Hoyt is a 48 y.o. male who was referred to me by Natasha Wilkins in consultation for left submandibular lymphadenopathy and  salivary gland swelling. He notes that he has noted fullness on the left side of his neck for about 2 years.  He had a CT scan of the neck at that time that was normal.  Over the past 2 weeks he has had left swelling over the parotid region at at the angle of the mandible.  He was seen in urgent care and notes he was given Augmentin without much relief.  He notes that he will have a sharp pain upon the first bite of food almost "like a toothache" then it subsides.      He states that he has not had a history of skin cancer and denies any non-healing skin lesions.  He has not seen a dentist in several years.  He denies mouth sores, oral bleeding, sore throat, cough, hemoptysis, otalgia, or change in voice.  He has a high vocal demand job as a  so he notes his voice is always raspy.       He is an active smoker.  He does use smokeless tobacco. He states that he has used smokeless tobacco intermittently over the last 20 years.     Past Medical History  He has a past medical history of Abdominal pain, LLQ (left lower quadrant) and Diverticular disease.    Past Surgical History  He has no past surgical history on file.    Family History  His family history includes Cancer in his father.    Social History  He reports that he has been smoking cigars.  He uses smokeless tobacco. He reports that he drinks alcohol. He reports that he does not use drugs.    Allergies  He has No Known Allergies.    Medications  He has a current medication list which includes the following prescription(s): sildenafil, mupirocin, and sodium chloride.    Review of Systems   Constitutional: Negative for activity change and unexpected weight change.   Eyes: Negative for pain and visual disturbance.   Respiratory: Negative for shortness of breath and stridor.  "   Cardiovascular: Negative for chest pain and leg swelling.   Gastrointestinal: Negative for abdominal pain and nausea.   Endocrine: Negative for cold intolerance and heat intolerance.   Musculoskeletal: Negative for gait problem and joint swelling.   Skin: Negative for color change and wound.   Allergic/Immunologic: Negative for immunocompromised state.   Neurological: Negative for seizures and weakness.   Hematological: Negative for adenopathy. Does not bruise/bleed easily.   Psychiatric/Behavioral: Negative for agitation and confusion.          Objective:     /81 (BP Location: Left arm, Patient Position: Sitting, BP Method: X-Large (Automatic))   Pulse 88   Ht 6' (1.829 m)   Wt 119.5 kg (263 lb 7.2 oz)   BMI 35.73 kg/m²    Physical Exam    Constitutional: Well appearing / communicating.  NAD.  Eyes: EOM I Bilaterally  Head/Face: Normocephalic.  Negative paranasal sinus pressure/tenderness.  Salivary glands WNL.  House Brackmann I Bilaterally.    Right Ear: External Auditory Canal WNL,TM w/o masses/lesions/perforations.  Auricle WNL.  Left Ear: External Auditory Canal WNL,TM w/o masses/lesions/perforations. Auricle WNL.  Nose: Nasal septal deviation with left spur obstructing left middle meatus. Excoriated nasal septal tissue. Inferior Turbinates 3+ bilaterally. No septal perforation. No masses/lesions. External nasal skin without masses/lesions.  Oral Cavity: Gingiva/lips WNL.  FOM Soft, no masses palpated. Oral Tongue mobile. Hard Palate WNL.   Oropharynx: BOT WNL. No masses/lesions noted. Tonsillar fossa/pharyngeal wall without lesions. Posterior oropharynx WNL Soft palate without masses. Midline uvula.   Neck/Lymphatic:   No thyromegaly.  3x6 cm firm, mass at angle of mandible/ left level II; decreased mobility    Mirror laryngoscopy/nasopharyngoscopy: Active gag reflex.  Unable to perform.    Neuro/Psychiatric: AOx3.  Normal mood and affect.   Cardiovascular: Normal carotid pulses bilaterally, no  increasing jugular venous distention noted at cervical region bilaterally.    Respiratory: Normal respiratory effort, no stridor, no retractions noted.    Procedure    Flexible laryngoscopy performed.  See procedure note.        CT scan soft tissue neck with contrast 2/3/2017:  Normal neck CT.  Specifically, no lymphadenopathy.      Assessment:     1. Neck mass    2. Nasal septal deviation    3. Chronic rhinitis         Plan:     I had a long discussion with the patient regarding his condition and the further workup and management options.  Plan urgent CT scan of the neck to further evaluate and FNA biopsy of neck mass.   Nasal septal deviation with chronic nasal septal irritation/rhinitis: Bactroban ointment to the right nasal septum BID. Nasal saline rinses BID.     Follow-up in about 3 weeks (around 9/26/2018).     Geraldine Crespo MD

## 2018-09-05 NOTE — PROCEDURES
Procedures     Due to indication in patient's history, presentation or risk factors,  a fiber optic exam was performed.    SEPARATE PROCEDURE NOTE:    ANESTHESIA:  Topical xylocaine with stu-synephrine    FINDINGS:  Mild tonsillar asymmetry      PROCEDURE:  After verbal consent was obtained, the flexible scope was passed through the patient's nasal cavity without difficulty.  The nasopharynx (adenoid pad) and eustachian tube orifices were first visualized and were found to be normal, without masses or irregularity.  The posterior pharyngeal wall and base of tongue were then examined and no mass or irregular tissue was seen.  Slight tonsillar asymmetry with left tonsil slightly more prominent. The scope was then advanced to the larynx, and the epiglottis, valleculae, and piriform sinuses were normal, without masses or mucosal irregularity.  The false vocal folds and true vocal folds were then examined and were found to have normal mobility (full abduction and adduction) and no masses or mucosal irregularity was seen.  The arytenoids and the interarytenoid area were normal. The patient tolerated the procedure well without complications.

## 2018-09-14 ENCOUNTER — HOSPITAL ENCOUNTER (OUTPATIENT)
Dept: RADIOLOGY | Facility: HOSPITAL | Age: 48
Discharge: HOME | End: 2018-09-14
Attending: OTOLARYNGOLOGY
Payer: COMMERCIAL

## 2018-09-14 DIAGNOSIS — R22.1 NECK MASS: ICD-10-CM

## 2018-09-14 DIAGNOSIS — R22.1 NECK MASS: Primary | ICD-10-CM

## 2018-09-14 PROCEDURE — 25500020 PHARM REV CODE 255: Performed by: OTOLARYNGOLOGY

## 2018-09-14 PROCEDURE — 70492 CT SFT TSUE NCK W/O & W/DYE: CPT | Mod: TC

## 2018-09-14 PROCEDURE — 70492 CT SFT TSUE NCK W/O & W/DYE: CPT | Mod: 26,,, | Performed by: RADIOLOGY

## 2018-09-14 RX ADMIN — IOHEXOL 75 ML: 350 INJECTION, SOLUTION INTRAVENOUS at 07:09

## 2018-09-14 NOTE — PROCEDURES
Frankie Hoyt is a 48 y.o. male patient.    Pulse: 88 (09/05/18 0814)  BP: 136/81 (09/05/18 0814)  Weight: 119.5 kg (263 lb 7.2 oz) (09/05/18 0814)  Height: 6' (182.9 cm) (09/05/18 0814)       Procedures    Geraldine Crespo  9/14/2018

## 2018-09-14 NOTE — PROGRESS NOTES
I called and spoke to patient regarding findings of CT scan of the neck. We discussed the findings are concerning for malignant process and he will need additional urgent tests(FNA biopsy and PET-CT scan).  We also discussed that he will benefit from referral the [facility], which we will arrange. The patient verbalized understanding.

## 2018-09-17 ENCOUNTER — TELEPHONE (OUTPATIENT)
Dept: OTOLARYNGOLOGY | Facility: CLINIC | Age: 48
End: 2018-09-17

## 2018-09-17 ENCOUNTER — TELEPHONE (OUTPATIENT)
Dept: INTERVENTIONAL RADIOLOGY/VASCULAR | Facility: HOSPITAL | Age: 48
End: 2018-09-17

## 2018-09-17 NOTE — TELEPHONE ENCOUNTER
----- Message from Domonique Levy sent at 9/17/2018  9:17 AM CDT -----  Contact: Self/ [phone]  Patient called in requesting to speak with you. Patient prefers to speak with a nurse.     Please call and advise.

## 2018-09-17 NOTE — TELEPHONE ENCOUNTER
I spoke to patient regarding upcoming appointment with Dr. Serge Mccray on Thursday 9/20 at 9:00am.  Follow up questions to our conversation regarding questions about the mass and further work-up and plans going forward were discussed in detail.  The patient verbalized understanding and understands he may reach out at any time for further questions.

## 2018-09-17 NOTE — TELEPHONE ENCOUNTER
Spoke with patient he states he was scheduled with Dr Mccray on Thursday, he was calling to confirm with Dr Franks if she referred him to be seen by specialists at [facility].

## 2018-09-18 ENCOUNTER — HOSPITAL ENCOUNTER (OUTPATIENT)
Facility: HOSPITAL | Age: 48
Discharge: HOME | End: 2018-09-18
Attending: RADIOLOGY | Admitting: RADIOLOGY
Payer: COMMERCIAL

## 2018-09-18 VITALS
HEART RATE: 72 BPM | DIASTOLIC BLOOD PRESSURE: 88 MMHG | WEIGHT: 256.38 LBS | TEMPERATURE: 99 F | RESPIRATION RATE: 15 BRPM | SYSTOLIC BLOOD PRESSURE: 153 MMHG | OXYGEN SATURATION: 95 % | HEIGHT: 72 IN | BODY MASS INDEX: 34.72 KG/M2

## 2018-09-18 DIAGNOSIS — R22.1 NECK MASS: ICD-10-CM

## 2018-09-18 PROCEDURE — 88342 IMHCHEM/IMCYTCHM 1ST ANTB: CPT | Mod: 26,,, | Performed by: PATHOLOGY

## 2018-09-18 PROCEDURE — 88173 CYTOPATH EVAL FNA REPORT: CPT | Performed by: PATHOLOGY

## 2018-09-18 PROCEDURE — 88173 CYTOPATH EVAL FNA REPORT: CPT | Mod: 26,,, | Performed by: PATHOLOGY

## 2018-09-18 PROCEDURE — 88172 CYTP DX EVAL FNA 1ST EA SITE: CPT | Performed by: PATHOLOGY

## 2018-09-18 PROCEDURE — 88305 TISSUE EXAM BY PATHOLOGIST: CPT | Mod: 26,,, | Performed by: PATHOLOGY

## 2018-09-18 PROCEDURE — 88342 IMHCHEM/IMCYTCHM 1ST ANTB: CPT | Performed by: PATHOLOGY

## 2018-09-18 PROCEDURE — 88172 CYTP DX EVAL FNA 1ST EA SITE: CPT | Mod: 26,,, | Performed by: PATHOLOGY

## 2018-09-18 NOTE — H&P
Interventional Radiology Pre-Procedure History & Physical, Outpatient      Chief Complaint/Reason for Referral: Neck mass    History of Present Illness:  Frankie Hoyt is a 48 y.o. male with suspicious left neck mass on recent CT. Image-guided FNA requested for tissue diagnosis.    Past Medical History:   Diagnosis Date    Abdominal pain, LLQ (left lower quadrant) 12/2012    Diverticular disease 2012     Past Surgical History:   Procedure Laterality Date    colonscopy  2012       Allergies:   Review of patient's allergies indicates:  No Known Allergies    Home Meds:   Prior to Admission medications    Medication Sig Start Date End Date Taking? Authorizing Provider   sildenafil (VIAGRA) 100 MG tablet Take 1 tablet (100 mg total) by mouth daily as needed for Erectile Dysfunction. 8/23/18 8/23/19  Natasha Wilkins, NP   sodium chloride (SALINE NASAL) 0.65 % nasal spray 2 sprays by Nasal route 2 (two) times daily. 9/5/18   Geraldine Crespo MD       Anticoagulation/Antiplatelet: no anticoagulation    Review of Systems:   Hematological: no known coagulopathies  Respiratory: no shortness of breath  Cardiovascular: no chest pain  Gastrointestinal: no abdominal pain  Genitourinary: no dysuria  Musculoskeletal: negative  Neurological: no TIA or stroke symptoms     Physical Exam:  Temp: 99.2 °F (37.3 °C) (09/18/18 1428)  Pulse: 74 (09/18/18 1428)  Resp: 15 (09/18/18 1428)  BP: (!) 157/80 (09/18/18 1428)  SpO2: 96 % (09/18/18 1428)    General: WNWD, NAD  HEENT: Normocephalic, sclera anicteric, oropharynx clear  Neck: Supple, no palpable lymphadenopathy  Heart: RRR  Lungs: Symmetric excursions, breathing unlabored  Abd: NTND, soft  Extremities: MAEW, no CCE  Pulses: 2+ DP b/l  Neuro: Gross nonfocal    Laboratory:  No results found for: INR    Lab Results   Component Value Date    WBC 4.86 09/29/2017    HGB 14.2 09/29/2017    HCT 43.0 09/29/2017    MCV 88 09/29/2017     (H) 09/29/2017      Lab Results    Component Value Date     09/29/2017     09/29/2017    K 4.0 09/29/2017     09/29/2017    CO2 27 09/29/2017    BUN 24 (H) 09/29/2017    CREATININE 0.96 09/29/2017    CALCIUM 9.4 09/29/2017    ALT 51 (H) 09/29/2017    AST 33 09/29/2017    ALBUMIN 4.2 09/29/2017    BILITOT 1.0 09/29/2017       Imaging:  CT neck 9/14/18 reviewed.    Assessment/Plan:  48 y.o. male with a suspicious left neck mass for image-guided FNA and possible core biopsy today.    Sedation plan: Local lidocaine 1%    Risks (including, but not limited to, pain, bleeding, infection, damage to nearby structures, failure to obtain sufficient tissue for a diagnosis, and the need for additional procedures), benefits, and alternatives were discussed with the patient. All questions were answered to the best of my abilities. The patient wishes to proceed with biopsy. Written informed consent was obtained.      Raymond Velasquez MD  [facility]  Pager [phone]

## 2018-09-18 NOTE — NURSING
Discharge instructions reviewed with patient. Verbalized understanding, no questions at this time. Procedure site is DSI. VSS. No acute distress noted. Pt home with self in private vehicle.

## 2018-09-18 NOTE — PROCEDURES
Interventional Radiology Post-Procedure Note    Pre Op Diagnosis: Left neck mass  Post Op Diagnosis: Same    Procedure: US-guided FNA    Procedure performed by: Ron    Written Informed Consent Obtained: Yes  Specimen Removed: Yes  Estimated Blood Loss: Minimal    Findings:   FNA x5 of suspicious left neck mass noted on CT 9/14/18. Specimens given directly to pathology and adequacy confirmed.    No immediate complications. Patient tolerated procedure well. Please see full dictated procedure report for additional details.      Raymond Velasquez MD  [facility]  Pager [phone]

## 2018-09-19 ENCOUNTER — HOSPITAL ENCOUNTER (OUTPATIENT)
Dept: RADIOLOGY | Facility: HOSPITAL | Age: 48
Discharge: HOME | End: 2018-09-19
Attending: OTOLARYNGOLOGY
Payer: COMMERCIAL

## 2018-09-19 DIAGNOSIS — R22.1 NECK MASS: ICD-10-CM

## 2018-09-19 LAB — POCT GLUCOSE: 106 MG/DL (ref 70–110)

## 2018-09-19 PROCEDURE — 78815 PET IMAGE W/CT SKULL-THIGH: CPT | Mod: 26,PI,, | Performed by: RADIOLOGY

## 2018-09-19 PROCEDURE — A9552 F18 FDG: HCPCS

## 2018-09-19 PROCEDURE — 78815 PET IMAGE W/CT SKULL-THIGH: CPT | Mod: TC

## 2018-09-19 NOTE — DISCHARGE SUMMARY
Interventional Radiology Discharge Summary      Hospital Course: No complications.    Admit Date: 9/18/2018  Discharge Date: 09/19/2018     Instructions Given to Patient: Yes  Diet: Resume prior diet  Activity: Activity as tolerated    Description of Condition on Discharge: Stable  Vital Signs (Most Recent): Temp: 98.8 °F (37.1 °C) (09/18/18 1539)  Pulse: 72 (09/18/18 1539)  Resp: 15 (09/18/18 1539)  BP: (!) 153/88 (09/18/18 1539)  SpO2: 95 % (09/18/18 1539)    Discharge Disposition: Home    Discharge Diagnosis: Neck mass     Follow-up: With referring provider      Raymond Velasquez MD  [facility]  Pager [phone]

## 2018-09-20 ENCOUNTER — OFFICE VISIT (OUTPATIENT)
Dept: PSYCHIATRY | Facility: CLINIC | Age: 48
End: 2018-09-20
Payer: COMMERCIAL

## 2018-09-20 ENCOUNTER — TELEPHONE (OUTPATIENT)
Dept: SPEECH THERAPY | Facility: HOSPITAL | Age: 48
End: 2018-09-20

## 2018-09-20 ENCOUNTER — OFFICE VISIT (OUTPATIENT)
Dept: OTOLARYNGOLOGY | Facility: CLINIC | Age: 48
End: 2018-09-20
Payer: COMMERCIAL

## 2018-09-20 ENCOUNTER — CLINICAL SUPPORT (OUTPATIENT)
Dept: HEMATOLOGY/ONCOLOGY | Facility: CLINIC | Age: 48
End: 2018-09-20
Payer: COMMERCIAL

## 2018-09-20 VITALS
WEIGHT: 258.81 LBS | HEART RATE: 80 BPM | SYSTOLIC BLOOD PRESSURE: 140 MMHG | BODY MASS INDEX: 35.1 KG/M2 | TEMPERATURE: 99 F | DIASTOLIC BLOOD PRESSURE: 85 MMHG

## 2018-09-20 VITALS — HEIGHT: 72 IN | BODY MASS INDEX: 35.05 KG/M2 | WEIGHT: 258.81 LBS

## 2018-09-20 DIAGNOSIS — Z71.3 NUTRITIONAL COUNSELING: Primary | ICD-10-CM

## 2018-09-20 DIAGNOSIS — C09.9 MALIGNANT TUMOR OF PALATINE TONSIL: Primary | ICD-10-CM

## 2018-09-20 DIAGNOSIS — R45.89 ANXIETY ABOUT HEALTH: Primary | ICD-10-CM

## 2018-09-20 DIAGNOSIS — C09.9 MALIGNANT TUMOR OF PALATINE TONSIL: ICD-10-CM

## 2018-09-20 PROCEDURE — 99999 PR PBB SHADOW E&M-EST. PATIENT-LVL II: CPT | Mod: PBBFAC,,, | Performed by: DIETITIAN, REGISTERED

## 2018-09-20 PROCEDURE — 97802 MEDICAL NUTRITION INDIV IN: CPT | Mod: S$GLB,,, | Performed by: DIETITIAN, REGISTERED

## 2018-09-20 PROCEDURE — 99215 OFFICE O/P EST HI 40 MIN: CPT | Mod: S$GLB,,, | Performed by: OTOLARYNGOLOGY

## 2018-09-20 PROCEDURE — 3008F BODY MASS INDEX DOCD: CPT | Mod: CPTII,S$GLB,, | Performed by: OTOLARYNGOLOGY

## 2018-09-20 PROCEDURE — 99999 PR PBB SHADOW E&M-EST. PATIENT-LVL V: CPT | Mod: PBBFAC,,, | Performed by: OTOLARYNGOLOGY

## 2018-09-20 PROCEDURE — 90791 PSYCH DIAGNOSTIC EVALUATION: CPT | Mod: S$GLB,,, | Performed by: PSYCHOLOGIST

## 2018-09-20 PROCEDURE — 99999 PR PBB SHADOW E&M-EST. PATIENT-LVL II: CPT | Mod: PBBFAC,,, | Performed by: PSYCHOLOGIST

## 2018-09-20 NOTE — LETTER
September 20, 2018        Serge Mccray MD  [address] [facility] [address]             [facility]  [address] [facility] [address]  Phone: [phone]  Fax: [phone]   Patient: Frankie Hoyt   MR Number: [id number]   YOB: 1970   Date of Visit: 9/20/2018       Dear Dr. Mccray:    Thank you for referring Frankie Hoyt to me for evaluation. Below are the relevant portions of my assessment and plan of care.     Frankie Hoyt is a 48 y.o. male referred by Dr. Mccray for psychological evaluation and treatment.  Mr. Hoyt has a pre-existing tendency toward health anxiety and dispositional pessimism. These factors pre-dispose him to anxiety and depression during treatment.  Mood protective strategies during cancer treatment were discussed.  Patient was given information about the [facility] support group.  He was encouraged to continue with pleasant events scheduling and regular exercise. He was discouraged from excessive internet searches related to his diagnosis until his team has planned a definitive course. Patient will alert medical team if mood/anxiety symptoms develop/increase.  There are no recommendations for psychological treatment at this time. The patient and his wife are aware of resources available should their needs change in the future.  He would benefit from resource assistance evaluation by social work due to financial concerns.     If you have questions, please do not hesitate to call me. I look forward to following Frankie along with you.    Sincerely,      Matthew Ortiz, PhD           CC  MD Zully Quezada, [facility]

## 2018-09-20 NOTE — PROGRESS NOTES
REFERRING PHYSICIAN: Dr. Mccray    DIAGNOSIS:       HISTORY OF PRESENT ILLNESS:   Mr. Hoyt is a 47 yo male smoker and tobacco chewer who has had a waxing and waning neck mass for the last 18 months or so.  CT neck in 2017 was read as negative.   It may have resolved vs. Stabilized but he noticed left cheek swelling in 2018.  He went to urgent care and was placed on antibiotics for 10 days without any improvement.  He was seen by Dr. Franks and FNA of the left neck mass was positive for malignant cells.  On exam she noted some L tonsillar asymmetry but no other abnormalities on scope exam.  CT neck showed a mass within the left neck at the level of the angle of the mandible which likely represents an enlarged lymph node or lymph node conglomerate, measuring 3.9 x 2.4 x 5.2 cm.  Mass demonstrates irregular margins in keeping with extracapsular extension.  There is encasement of the external carotid artery and internal jugular vein with less than 180 degree abutment of the internal and distal common carotid artery.  There is non visualization of the left internal jugular vein, probably occluded.  There is prominence of the left palatine tonsil as well as fullness of the left vallecula.  He has been evaluated by Dr. Mccray.  He underwent a PET/CT yesterday showing a hypermetabolic left cervical mass and bilateral palatine tonsils.  Low level uptake within a borderline sized right cervical node.  No evidence of distant metastatic disease.  Today he feels well.  He just saw Dr. Castillo.  He has seen nutrition and onc psychology.  He denies changes in activity, appetite.  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  No dysphagia, odynophagia or otalgia.  No weight loss.  He has a dental appointment for mid next week.    ECO  ECOG SCORE         REVIEW OF SYSTEMS:   As above.  In addition, patient denies headaches, visual problems, dizziness, chest pain, shortness of  breath, cough, nausea, vomiting, diarrhea, or any new bony pains.  Patient also denies easy bruising, skin rashes, or numbness or tingling.    PAST MEDICAL HISTORY:  Past Medical History:   Diagnosis Date    Abdominal pain, LLQ (left lower quadrant) 2012    Diverticular disease        PAST SURGICAL HISTORY:  Past Surgical History:   Procedure Laterality Date    colonscopy         ALLERGIES:   Review of patient's allergies indicates:  No Known Allergies    MEDICATIONS:  Current Outpatient Medications   Medication Sig    sildenafil (VIAGRA) 100 MG tablet Take 1 tablet (100 mg total) by mouth daily as needed for Erectile Dysfunction.    sodium chloride (SALINE NASAL) 0.65 % nasal spray 2 sprays by Nasal route 2 (two) times daily.     No current facility-administered medications for this visit.        SOCIAL HISTORY:  Social History     Socioeconomic History    Marital status:      Spouse name: Not on file    Number of children: Not on file    Years of education: Not on file    Highest education level: Not on file   Social Needs    Financial resource strain: Not on file    Food insecurity - worry: Not on file    Food insecurity - inability: Not on file    Transportation needs - medical: Not on file    Transportation needs - non-medical: Not on file   Occupational History    Not on file   Tobacco Use    Smoking status: Former Smoker     Types: Cigars     Last attempt to quit:      Years since quittin.7    Smokeless tobacco: Former User   Substance and Sexual Activity    Alcohol use: Yes     Comment: weekend    Drug use: No    Sexual activity: Not on file   Other Topics Concern    Not on file   Social History Narrative    Not on file   3 children, ages 20, 16 and 12.  Lives in [place].    FAMILY HISTORY:  Family History   Problem Relation Age of Onset    Cancer Father         colon and prostate cancer    Heart disease Father          PHYSICAL EXAMINATION:  BP (!) 142/71  (BP Location: Right arm, Patient Position: Sitting)   Pulse 77   Temp 98.2 °F (36.8 °C) (Oral)   Resp 20   Ht 6' (1.829 m)   Wt 118 kg (260 lb 3.2 oz)   BMI 35.29 kg/m²   GENERAL: Patient is alert and oriented, in no acute distress.  HEENT:Extraocular muscles are intact.  Oropharynx is clear without lesions.  Front lower teeth are decayed with gum disease obvious.  No visible or palpable oral cavity lesions.  L tonsil significantly enlarged and somewhat irregular surface without visible ulceration or bleeding.  Right tonsil enlarged, less so than the left.  Flexible fiberoptic laryngoscopy was not performed.    LYMPH: There is a firm fixed left cervical LN measuring 4cm.  No other cervical or SCV LAD palpated.  HEART: Regular rate and rhythm.  LUNGS: Clear to auscultation bilaterally.  ABDOMEN:Soft, nontender, nondistended, without hepatosplenomegaly.  Normoactive bowel sounds.  EXTREMITIES: No clubbing, cyanosis, or edema.  NEUROLOGICAL: Cranial nerve II through XII grossly intact.  Sensation is intact.  Strength is 5 out of 5 in the upper and lower extremities bilaterally.  Gait is normal.    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  p16 status unknown.  No evidence of mets on PET/CT.    PLAN:  Awaiting p16 status, which has been requested by Dr. Mccray.  Will most likely be positive.  He will see his dentist ASAP as it his likely he will need extractions and healing prior to chemoRT.  He has PET and exam findings somewhat suspicious for involvement of his bilateral tonsils.  Will discuss his case at head and neck conference to formulate a treatment plan.  He probably has SHERRIE of his left cervical node and surgery would be an unlikely option, expecially if he is p16+, as he would need trimodality therapy which would probably be overkill.  Will likely need chemoRT but this will be discussed.  ? Need  for R tonsil biopsy.  He no longer smokes and says he has quit chewing tobacco.    The risks, benefits, and side effects of radiation were explained in detail to the patient.  We discussed both acute and late side effects, including but not limited to dermatitis, mucositis, dysgeusia, xerostomia, need for feeding tube placement, dysphagia, and osteoradionecrosis.  All of his questions were answered and informed consent was signed. I plan to see the patient back for radiation planning CT as soon as possible, depending on the results of his dental eval.     I spent approximately 60 minutes reviewing the available records and evaluating the patient, out of which over 50% of the time was spent face to face with the patient in counseling and coordinating this patient's care.

## 2018-09-20 NOTE — LETTER
September 23, 2018      Natasha Wilkins, TAE  [address] [address]           [facility]  [address]  [facility] [address]  Phone: [phone]  Fax: [phone]          Patient: Frankie Hoyt   MR Number: [id number]   YOB: 1970   Date of Visit: 9/20/2018       Dear Dr. Geraldine Crespo:    Thank you for referring Frankie Hoyt to me for evaluation. Attached you will find relevant portions of my assessment and plan of care.    If you have questions, please do not hesitate to call me. I look forward to following Frankie Hoyt along with you.    Sincerely,    Serge Mccray MD    Enclosure  CC:  Geraldine Crespo MD    If you would like to receive this communication electronically, please contact [email or web address] or [phone] to request more information on [facility] access.    For providers and/or their staff who would like to refer a patient to [facility], please contact us through our one-stop-shop provider referral line, [facility], at [phone].    If you feel you have received this communication in error or would no longer like to receive these types of communications, please e-mail [email or web address]

## 2018-09-20 NOTE — PROGRESS NOTES
PSYCHO-ONCOLOGY INTAKE    Diagnostic Interview - CPT 34393    Date: 9/20/2018  Site: [facility]     Evaluation Length (direct face-to-face time):  45 minutes     Referral Source: Serge Mccray MD  Oncologist: LIYAH Castillo MD   PCP: Natasha Wilkins NP    Clinical status of patient: Outpatient    Frankie Hoyt, a 48 y.o. male, seen for initial evaluation visit.  Met with patient and spouse.    Chief complaint/reason for encounter: adjustment to illness, anxiety    Medical/Surgical History:    Patient Active Problem List   Diagnosis    Diverticulitis of sigmoid colon    Abdominal pain, LLQ (left lower quadrant)    Family history of colon cancer requiring screening colonoscopy    Neck mass    Malignant tumor of palatine tonsil    Anxiety about health       Health Behaviors:       ETOH Use: Yes (social and exceeding NIAAA healthy use limits for age and gender, 12 pk each weekend- denies social, occupational or functional impairment)     Tobacco Use: No (prior cigar smoking, stopped using chewing tobacco last week,    Illicit Drug Use:  No     Prescription Misuse:No   Caffeine: currently 2-3 servings/day; was using caffeine for appetite control- stopped 1 week ago   Exercise:The patient maintains a regular, healthy exercise program.  (lifts weights 2x week, cardio 4x week)    Family History:   Psychiatric illness: Yes  (father- PTSD)   Alcohol/Drug Abuse: Yes  (father- etoh)   Suicide: No      Past Psychiatric History:   Inpatient treatment: No     Outpatient treatment: No     Prior substance abuse treatment: No     Suicide Attempts: No     Psychotropic Medications: Current/Past: None     Current medications below, allergies reviewed in chart.  Current Outpatient Medications   Medication    sildenafil (VIAGRA) 100 MG tablet    sodium chloride (SALINE NASAL) 0.65 % nasal spray     No current facility-administered medications for this visit.         CAM Therapies: None     Screening: Depression: Denies  (some  depressed mood in recent week, does not meet MD dx criteria)   Mary: Denies Psychosis: Denies    Generalized anxiety: Denies  (Standardized anxiety screening used- ISRAEL-7= does not meet screener)   Panic Disorder: Denies    Social/specific phobia: Denies OCD: Denies   Sexual Dysfunction:  Denies Trauma: Denies      Social situation/Stressors: Frankie Hoyt lives with his wife and 2 younger children in [place], [place].  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  His spouse is Kym.   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 Frankie Hoyt's hobbies include working out.  Additional stressors:  Pre-existing financial strain, diverticulitis    Strengths:Housing stability, Able to vocalize needs, Motivation, readiness for change, Setting and pursuing goals, hopes, dreams, aspirations, Vocational interests, hobbies and/or talents and Interpersonal relationships and supports available - family, relatives, friends  Liabilities: Complicated medical illness and Financial strain    Current Evaluation:     Mental Status Exam: Frankie Hoyt arrived promptly for the assessment session. His wife Kym was also present during the interview, with the consent of Frankie Hoyt.  The patient was fully cooperative throughout the interview and was an adequate historian   Appearance: age appropriate,  casually dressed, well groomed  Behavior/Cooperation: friendly and cooperative  Speech:normal in rate, volume, and tone   Mood: anxious, dysthymic  Affect: dysphoric  Thought Process: goal-directed, logical  Thought Content: normal, no suicidality, no homicidality, delusions, or paranoia;did not appear to be responding to internal stimuli during the interview.   Orientation: grossly intact  Memory: Grossly intact  Attention Span/Concentration: Attends to interview without distraction; reports no difficulty  Fund of Knowledge:  "average  Estimate of Intelligence: average from verbal skills and history  Cognition: grossly intact  Insight: patient has awareness of illness; good insight into own behavior and behavior of others  Judgment: the patient's behavior is adequate to circumstances    Distress Score             Practical Problems Physical Problems                                                   Family Problems                                         Emotional Problems                                                         Spiritual/Religions Concerns               Other Problems            MMSE:       History of present illness:  Patient diagnosed with a malignant tumor of the palatine tonsil by Dr. Mccray. Patient referred for pre-treatment psychological assessment.  Frankie Hoyt has adjusted to illness with difficulty primarily through active coping strategies, exercise and focus on work. He states he tends to "mope" and engage in "self-pity."  He is dispositionally pessimistic, especially about health in recent years. He has engaged in excessive information gathering ("Google every symptom").  The patient has satisfactory family/friend support.  His support system is coping adequately with the diagnosis/treatment/prognosis (wife distressed during visit). Illness-related psychosocial stressors include potential for financial strain, absence from work and difficulty meeting family responsibilities.  The patient has a satisfactory and growing partnership with his [facility] oncology treatment team. The patient reports the following barriers to cancer care: none.    Symptoms:   · Mood: depressed mood and worthlessness/guilt;  no prior and no SI/HI  · Anxiety: excessive anxiety/worry, restlessness/keyed up and irritability; prior anxiety: since 2012 ,predominantly health related; he tends to be hyper-focused on physical symptom, worry impairs sleep unless works out  · Substance abuse: use over ETOH recommendations without SOFA " impairment  · Cognitive functioning: denied  · Health behaviors: noncontributory.       Assessment - Diagnosis - Goals:       ICD-10-CM ICD-9-CM   1. Anxiety about health F41.8 300.09       Plan: group support, individual therapy as needed    Summary and Recommendations  Frankie Hoyt is a 48 y.o. male referred by Dr. Mccray for psychological evaluation and treatment.  Mr. Hoty has a pre-existing tendency toward health anxiety and dispositional pessimism. These factors pre-dispose him to anxiety and depression during treatment.  Mood protective strategies during cancer treatment were discussed.  Patient was given information about the [facility] support group.  He was encouraged to continue with pleasant events scheduling and regular exercise. He was discouraged from excessive internet searches related to his diagnosis until his team has planned a definitive course. Patient will alert medical team if mood/anxiety symptoms develop/increase.  There are no recommendations for psychological treatment at this time. The patient and his wife are aware of resources available should their needs change in the future.  He would benefit from resource assistance evaluation by social work due to financial concerns.    (Relaxation exercises next visit if he returns)      Matthew Valdes, PhD  Clinical Psychologist  [place] License #[id number]

## 2018-09-20 NOTE — PROGRESS NOTES
Medical Nutrition Therapy Oncology Progress Note    Name: Frankie Hoyt MRN: [id number]  : 1970    Age: 48 y.o.  Ethnicity: /White Language: English    Diagnosis: No diagnosis found.    Chemo Regimen: Unsure at this time   Referring MD: Dr. Mccray Frequency:  Radiation: Unsure at this time           Goal of Cancer treatment          Nutrition Assessment     Chief Complaint:   Chief Complaint   Patient presents with    Nutrition Counseling    Cancer     malignant tumor of palatine tonsil        Anthropometric Measurements:  Height: 6' (1.829 m)  Current Weight: 117.4 kg (258 lb 13.1 oz)  Ideal Body Weight: 178#  Percent Ideal Body Weight:: 144%  BMI: Body mass index is 35.1 kg/m².     Weight History:   Wt Readings from Last 8 Encounters:   18 117.4 kg (258 lb 13.1 oz)   18 117.4 kg (258 lb 13.1 oz)   18 116.3 kg (256 lb 6.4 oz)   18 119.5 kg (263 lb 7.2 oz)   18 120.4 kg (265 lb 6.4 oz)   17 117.1 kg (258 lb 3.2 oz)   03/15/17 116.5 kg (256 lb 13.4 oz)   17 117.6 kg (259 lb 4.2 oz)     Medical Health History:  Feeding tube placed: No  Pre-treatment: Yes    Past Surgical History:   Procedure Laterality Date    colonscopy          Medications:   Current Outpatient Medications:     sildenafil (VIAGRA) 100 MG tablet, Take 1 tablet (100 mg total) by mouth daily as needed for Erectile Dysfunction., Disp: 6 tablet, Rfl: 1    sodium chloride (SALINE NASAL) 0.65 % nasal spray, 2 sprays by Nasal route 2 (two) times daily., Disp: , Rfl: 12    Last Labs:  Last Labs:  Glucose   Date Value Ref Range Status   2017 101 70 - 110 mg/dL Final   03/15/2017 98 70 - 110 mg/dL Final     BUN, Bld   Date Value Ref Range Status   2017 24 (H) 2 - 20 mg/dL Final   03/15/2017 18 6 - 20 mg/dL Final     Creatinine   Date Value Ref Range Status   2017 0.96 0.50 - 1.40 mg/dL Final   03/15/2017 1.0 0.5 - 1.4 mg/dL Final     Sodium   Date Value Ref Range Status    09/29/2017 139 136 - 145 mmol/L Final   03/15/2017 140 136 - 145 mmol/L Final     Potassium   Date Value Ref Range Status   09/29/2017 4.0 3.5 - 5.1 mmol/L Final   03/15/2017 4.5 3.5 - 5.1 mmol/L Final     No results found for: PHOS  Calcium   Date Value Ref Range Status   09/29/2017 9.4 8.7 - 10.5 mg/dL Final   03/15/2017 9.8 8.7 - 10.5 mg/dL Final     No results found for: PREALBUMIN  Total Protein   Date Value Ref Range Status   09/29/2017 8.1 6.0 - 8.4 g/dL Final   03/15/2017 8.0 6.0 - 8.4 g/dL Final     Cholesterol   Date Value Ref Range Status   09/29/2017 157 120 - 199 mg/dL Final     Comment:     The National Cholesterol Education Program (NCEP) has set the  following guidelines (reference ranges) for Cholesterol:  Optimal.....................<200 mg/dL  Borderline High.............200-239 mg/dL  High........................> or = 240 mg/dL     02/01/2017 164 120 - 199 mg/dL Final     Comment:     The National Cholesterol Education Program (NCEP) has set the  following guidelines (reference ranges) for Cholesterol:  Optimal.....................<200 mg/dL  Borderline High.............200-239 mg/dL  High........................> or = 240 mg/dL       No results found for: HGBA1C  Hemoglobin   Date Value Ref Range Status   09/29/2017 14.2 14.0 - 18.0 g/dL Final   03/15/2017 15.4 14.0 - 18.0 g/dL Final     Hematocrit   Date Value Ref Range Status   09/29/2017 43.0 40.0 - 54.0 % Final   03/15/2017 45.7 40.0 - 54.0 % Final     No results found for: IRON  No components found for: FROLATE  No results found for: [id number]  WBC   Date Value Ref Range Status   09/29/2017 4.86 3.90 - 12.70 K/uL Final   03/15/2017 7.30 3.90 - 12.70 K/uL Final         Client History/Food Access:    Living Situation: Lives with spouse   Who: Shops for Groceries? Patient and Spouse   Who : Prepares meals? Patient and Spouse   Who: Fills prescriptions? Patient and Spouse   Are there financial difficulties purchasing food? No   Personal  History (occupation, physical activity level, exercise):      Baseline for Outcomes Monitoring  Food and Nutrition History: Pt here with wife for nutrition counseling before possibly starting chemo/radiation for head/neck cancer. Pt is a  and exercises regularly. Stable weight around 258#. Discussed importance of nutrition and maintaining weight throughout treatment. Pt denies any nutrition related issues at this time. Explained that significant weight loss can lead to a decreased in muscle mass and weakness. Discussed high calorie liquids and soft foods. Encouraged pt to use Boost Plus or Ensure Plus as oral nutrition supplements. Dicussed the [employer] at [employer] which pt is familiar with. Provided pt with head and neck treatment packet which suggests liquids and soft foods that are more easily tolerated with dysphagia. Provided tips on managing common side effects of chemo and radiation for head and neck cancer including dry mouth, difficulty swallowing, taste changes, nausea/vomiting, diarrhea/constipation, sore mouth and throat, gas, and poor appetite. Wife's sister is involved in cancer research and has been recommending cookbooks for wife to look into which is great. Pt had questions regarding alcohol consumption, caffeine supplements, and pain medication. Dr. Ortiz is aware. Answered questions and provided contact information for further questions, concerns, or issues.      Nutritional Needs:  Estimated Needs Method Use    2700 kcal/day [] Predictive Equation: Dillard-T[place]on   [x]  30 kcal/kg (adjusted)   Protein 110-125 g 1.2-1.4 gm/kg/day   Fluid 2700 ml 30 ml/kg/day     Food/Nutrient Intake (oral, enteral or parenteral) and Patient Behaviors     Calorie intake: Adequate   Protein intake: Adequate     Yes/No    N/A Uses medical food supplements   Yes Cooking techniques to minimize fatigue   No Currently modifying food textures   Yes Able to maintain usual physical  actiivty     Nutrition Diagnosis     Nutrition Diagnosis Related to (Etiology) As Evidenced By (Signs/Symptoms)   Increased nutrient needs Increased demand for nutrients Head and neck cancer with plans for chemo/radiation                 Nutritional Intervention and Prescription        Nutrition Intervention Texture-modified diet, Energy-modified diet and Protein-modified diet   Goals/Expected Outcomes Pt to choose high calorie, high protein soft foods and beverages to maintain weight throughout treatment.   Progress Initial     Nutrition Intervention Medical food supplement: Commercial beverage   Goals/Expected Outcomes Pt to consume Boost/Ensure Plus or Ensure Enlive as oral nutrition supplements.   Progress Initial     Nutrition Intervention Enteral nutrition: volume   Goals/Expected Outcomes There was no discussion of a PEG tube, but if needed recommend bolus 7 cans Isosource 1.5 (or equivalent) to provide 2625 calories and 119 g protein.   Progress Initial     Pt needs education? yes (see intervention)    Coordination of Nutrition Care: Comments:   Collaboration with other providers MD         Monitoring and Evaluation     Monitor: weight    Next Visit: PRN    Materials Provided:  1. RD contact information 2. Head and neck packet               Total time: 30 minutes    Nutrition Score:      ©2010 Academy of Nutrition and Dietetics Oncology Toolkit

## 2018-09-20 NOTE — PATIENT INSTRUCTIONS
A multidisciplinary evaluation has been arranged with other members of the Head and Neck Team.     Consultations are requested for evaluation and treatment of the patient's head and neck cancer with the following services:   1.  Radiation Oncology to discuss potential primary versus adjuvant therapy   2.  Medical Oncology to discuss concurrent therapy in either setting   3.  Psychosocial Oncology (Dr. Beckham) to assess distress and coping   3.  Speech and Language Pathology to evaluate speech and swallowing function and provide exercises to promote healthy swallowing during and after treatment   4.  Nutrition to assess the patient's current and future nutritional requirements   5.  Dentistry to determine if any dental work is required prior to radiotherapy (if indicated) to minimize the chance of longterm complications. Please meet with your dentist and determine if any teeth need to be removed before starting radiation therapy.      After these consultations have been completed, the patient's case will be discussed at the Multidisciplinary Head and Neck Planning Conference.  A formal recommendation for treatment will be made at that time.  Time was allowed for questions, and all questions were answered to the apparent satisfaction of the patient and family.

## 2018-09-20 NOTE — PROGRESS NOTES
HEAD AND NECK SURGICAL ONCOLOGY CLINIC    Subjective:       Patient ID: Frankie Hoyt is a 48 y.o. male.    Chief Complaint: Consult (SCC tonsil)    HPI  Oncology History:     Malignant tumor of palatine tonsil    9/18/2018 Biopsy     FNA =   Supplemental Diagnosis  Cellblock confirms squamous carcinoma with necrosis  Immunostain for p16 is positive. The controls stain appropriately         9/19/2018 Initial Diagnosis     Malignant tumor of palatine tonsil         9/22/2018 Tumor Markers     Patient's tumor was tested for the following markers: p16.                                              Results of the tumor marker test revealed positive          Frankie Hoyt is a 48 y.o. male who presents with a 1.5 year history of a left neck mass. He detected something in late 2016, and sought evaluation for this in February of 2017, at which time he had a CT scan. There was no abnormality detected then, so he went about his business. Since July of 2018, he noticed that his face was slightly swollen in front of his ear, along with some tenderness. He also notes that the left neck mass had fluctuated over time but seemed to get bigger of late. He went to urgent care, and was given 10 days of antibiotics, which he took without improvement. He then returned to his [facility] and was referred to Dr. Franks, who has referred him here.     He denies dysphagia, odynophagia, throat pain, and otalgia. He will get some ear tenderness when he pokes in his ear canal. His voice is normal. There is no hemoptysis or hematemesis. He is breathing well.    Past Medical History:   Diagnosis Date    Abdominal pain, LLQ (left lower quadrant) 12/2012    Anxiety     Diverticular disease 2012       Past Surgical History:   Procedure Laterality Date    COLONOSCOPY  2014    colonscopy  2012    tonsil biopsy 2018           Current Outpatient Medications:     sildenafil (VIAGRA) 100 MG tablet, Take 1 tablet (100 mg total) by mouth daily as needed for  Erectile Dysfunction., Disp: 6 tablet, Rfl: 1    sodium chloride (SALINE NASAL) 0.65 % nasal spray, 2 sprays by Nasal route 2 (two) times daily., Disp: , Rfl: 12    dexamethasone (DECADRON) 6 MG tablet, Take 1 tablet twice daily for 4 days after chemo, Disp: 24 tablet, Rfl: 0    OLANZapine (ZYPREXA) 10 MG tablet, Take for 4 days after chemo, start on the day of chemo, Disp: 15 tablet, Rfl: 2    ondansetron (ZOFRAN-ODT) 8 MG TbDL, Take 1 tablet (8 mg total) by mouth every 6 (six) hours as needed., Disp: 60 tablet, Rfl: 4    promethazine (PHENERGAN) 6.25 mg/5 mL syrup, Take 20 mLs (25 mg total) by mouth every 6 (six) hours as needed for Nausea., Disp: 473 mL, Rfl: 6    Review of patient's allergies indicates:  No Known Allergies    Social History     Socioeconomic History    Marital status:      Spouse name: Not on file    Number of children: Not on file    Years of education: Not on file    Highest education level: Not on file   Social Needs    Financial resource strain: Not on file    Food insecurity - worry: Not on file    Food insecurity - inability: Not on file    Transportation needs - medical: Not on file    Transportation needs - non-medical: Not on file   Occupational History    Not on file   Tobacco Use    Smoking status: Former Smoker     Types: Cigars     Last attempt to quit:      Years since quittin.7    Smokeless tobacco: Former User    Tobacco comment: 1 per weekend   Substance and Sexual Activity    Alcohol use: Yes     Alcohol/week: 7.2 oz     Types: 12 Cans of beer per week     Comment: weekend    Drug use: No    Sexual activity: Yes     Partners: Female   Other Topics Concern    Patient feels they ought to cut down on drinking/drug use Not Asked    Patient annoyed by others criticizing their drinking/drug use Not Asked    Patient has felt bad or guilty about drinking/drug use Not Asked    Patient has had a drink/used drugs as an eye opener in the AM Not Asked    Social History Narrative     Frankie Hoyt lives with his wife and 2 younger children in [place], [place].  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  His spouse is Kym.   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 Frankie Hoyt's hobbies include working out       Family History   Problem Relation Age of Onset    Cancer Father         colon and prostate cancer    Heart disease Father        Review of Systems   Constitutional: Negative for fatigue, fever and unexpected weight change.   HENT: Negative for ear discharge, facial swelling, hearing loss, mouth sores, rhinorrhea, sore throat, tinnitus, trouble swallowing and voice change.    Eyes: Negative for pain and visual disturbance.   Respiratory: Negative for cough and shortness of breath.    Cardiovascular: Negative for chest pain and palpitations.   Gastrointestinal: Negative for abdominal pain, constipation and diarrhea.   Genitourinary: Negative for difficulty urinating and dysuria.   Musculoskeletal: Positive for arthralgias. Negative for back pain and neck pain.   Skin: Negative for color change and rash.   Neurological: Negative for dizziness, seizures and headaches.   Hematological: Positive for adenopathy. Does not bruise/bleed easily.   Psychiatric/Behavioral: Negative for agitation. The patient is not nervous/anxious.        Objective:     Physical Exam   Constitutional: He is oriented to person, place, and time. He appears well-developed and well-nourished. He is cooperative.   HENT:   Head: Normocephalic and atraumatic.   Right Ear: Hearing, tympanic membrane, external ear and ear canal normal.   Left Ear: Hearing, tympanic membrane, external ear and ear canal normal.   Nose: Nose normal. No rhinorrhea, nasal deformity or septal deviation. Right sinus exhibits no maxillary sinus tenderness and no frontal sinus tenderness. Left sinus exhibits no  maxillary sinus tenderness and no frontal sinus tenderness.   Mouth/Throat: Uvula is midline, oropharynx is clear and moist and mucous membranes are normal. He does not have dentures. No oral lesions. No trismus in the jaw. No oropharyngeal exudate or posterior oropharyngeal edema.       .   Eyes: Conjunctivae and EOM are normal. Pupils are equal, round, and reactive to light. Right eye exhibits no chemosis. Left eye exhibits no chemosis.   Neck: Trachea normal, normal range of motion and phonation normal. Neck supple. No JVD present. No tracheal tenderness present. No tracheal deviation and no edema present. No thyroid mass and no thyromegaly present.       Salivary glands - there are no lesions, and there is no asymmetry of the parotid and submandibular glands   Cardiovascular: Normal rate, regular rhythm and intact distal pulses.   Pulmonary/Chest: Effort normal. No accessory muscle usage or stridor. No tachypnea. No respiratory distress.   Abdominal: Normal appearance. He exhibits no distension. There is no guarding.   Lymphadenopathy:     He has cervical adenopathy.        Right cervical: No deep cervical and no posterior cervical adenopathy present.       Left cervical: Deep cervical adenopathy present. No posterior cervical adenopathy present.        Right: No supraclavicular adenopathy present.        Left: No supraclavicular adenopathy present.   Left level II   Neurological: He is alert and oriented to person, place, and time. No cranial nerve deficit. Gait normal.   Skin: Skin is warm and dry. No lesion noted.   Psychiatric: He has a normal mood and affect. His speech is normal.   Vitals reviewed.         FNA 9/18/2018  Supplemental Diagnosis  Cellblock confirms squamous carcinoma with necrosis    CT Neck 9/14/2018:  FINDINGS:  There is a mass within the left neck at the level of the angle of the mandible which likely represents an enlarged lymph node or lymph node conglomerate, measuring 3.9 x 2.4 x 5.2  cm.  Mass demonstrates irregular margins in keeping with extracapsular extension.  There is encasement of the external carotid artery and internal jugular vein with less than 180 degree abutment of the internal and distal common carotid artery.  There is non visualization of the left internal jugular vein, probably occluded.    There is prominence of the left palatine tonsil as well as fullness of the left vallecula.    Salivary glands are unremarkable.  Thyroid gland is unremarkable.    Lung apices are clear.    Relatively hypodense appearance of the superior T1 vertebral body probably artifactual.      Impression       Left neck mass likely representing enlarged lymph node or lymph node conglomerate with extracapsular extension and vascular encasement.  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  Prominence of the left palatine tonsil and fullness of the left vallecula.  Recommend correlation with visual inspection as well as PET-CT.    Relatively hypodense appearance of the superior T1 vertebral body, probably artifactual.     PET-CT 9/19/2018:  Impression       Hypermetabolic left cervical mass and palatine tonsils.  Low level uptake within a borderline sized right cervical node.     Assessment & Plan:       Problem List Items Addressed This Visit     Malignant tumor of palatine tonsil - Primary     Frankie Hoyt is a 48 y.o. 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 Bilateral tonsil cancers, while possible, are mathematically unlikely, and it is my suspicion that the right-sided uptake represents chronic inflammation rather than malignancy. I have reviewed his scans with him and his wife, and there is obliteration of the left IJV, along with apparent radiographic FRANSISCO involving the SCM. Further, the location of this neck mass is in the region of the spinal accessory nerve in level II. For these reasons, especially the FRANSISCO, it is  doubtful that a surgery first strategy will actually save him any treatment, as he would probably need chemo-RT, and likely a full course, in the adjuvant setting. Given the potential, and likely, surgical morbidity from spinal accessory dysfunction and the loss of the SCM, plus the probable difficulty in obtaining wide clearance in the neck, I do not recommend TORS with neck dissection. I believe that his tumor is more amenable to chemo-RT (we discussed how the survival rates of both approaches are the same, based on available research).     A multidisciplinary evaluation has been arranged with other members of the Head and Neck Team.     Consultations are requested for evaluation and treatment of the patient's head and neck cancer with the following services:   1.  Radiation Oncology to discuss potential primary versus adjuvant therapy   2.  Medical Oncology to discuss concurrent therapy in either setting   3.  Psychosocial Oncology (Dr. Beckham) to assess distress and coping   3.  Speech and Language Pathology to evaluate speech and swallowing function and provide exercises to promote healthy swallowing during and after treatment   4.  Nutrition to assess the patient's current and future nutritional requirements   5.  We also recommended dentistry to determine if any dental work is required prior to radiotherapy (if indicated) to minimize the chance of longterm complications.  He will contact his dentist.    After these consultations have been completed, the patient's case will be discussed at the Multidisciplinary Head and Neck Planning Conference.  A formal recommendation for treatment will be made at that time.  Time was allowed for questions, and all questions were answered to the apparent satisfaction of the patient and family.    It may make sense to perform a diagnostic right tonsillectomy, as the radiation fields may be able to be scaled back for unilateral disease. I will discuss this with Dr. Ontiveros and  Dr. Castillo and get it on the schedule for next week, if they agree.            Relevant Orders    Ambulatory Referral to Hematology/Oncology/Nutrition    Ambulatory Referral to Hematology/Oncology/Psychology    SLP evaluation    Ambulatory referral to Oncology    Ambulatory referral to Radiation Oncology

## 2018-09-21 ENCOUNTER — INITIAL CONSULT (OUTPATIENT)
Dept: HEMATOLOGY/ONCOLOGY | Facility: CLINIC | Age: 48
End: 2018-09-21
Payer: COMMERCIAL

## 2018-09-21 ENCOUNTER — INITIAL CONSULT (OUTPATIENT)
Dept: RADIATION ONCOLOGY | Facility: CLINIC | Age: 48
End: 2018-09-21
Payer: COMMERCIAL

## 2018-09-21 VITALS
WEIGHT: 260.13 LBS | SYSTOLIC BLOOD PRESSURE: 134 MMHG | TEMPERATURE: 99 F | HEART RATE: 67 BPM | RESPIRATION RATE: 19 BRPM | HEIGHT: 72 IN | BODY MASS INDEX: 35.23 KG/M2 | DIASTOLIC BLOOD PRESSURE: 81 MMHG | OXYGEN SATURATION: 97 %

## 2018-09-21 VITALS
TEMPERATURE: 98 F | HEART RATE: 77 BPM | HEIGHT: 72 IN | SYSTOLIC BLOOD PRESSURE: 142 MMHG | WEIGHT: 260.19 LBS | RESPIRATION RATE: 20 BRPM | DIASTOLIC BLOOD PRESSURE: 71 MMHG | BODY MASS INDEX: 35.24 KG/M2

## 2018-09-21 DIAGNOSIS — C09.9 MALIGNANT TUMOR OF PALATINE TONSIL: Primary | ICD-10-CM

## 2018-09-21 DIAGNOSIS — T45.1X5A CHEMOTHERAPY-INDUCED VOMITING: ICD-10-CM

## 2018-09-21 DIAGNOSIS — R11.10 CHEMOTHERAPY-INDUCED VOMITING: ICD-10-CM

## 2018-09-21 PROCEDURE — 3008F BODY MASS INDEX DOCD: CPT | Mod: CPTII,S$GLB,, | Performed by: RADIOLOGY

## 2018-09-21 PROCEDURE — 99999 PR PBB SHADOW E&M-EST. PATIENT-LVL III: CPT | Mod: PBBFAC,,, | Performed by: RADIOLOGY

## 2018-09-21 PROCEDURE — 99999 PR PBB SHADOW E&M-EST. PATIENT-LVL IV: CPT | Mod: PBBFAC,,, | Performed by: INTERNAL MEDICINE

## 2018-09-21 PROCEDURE — 3008F BODY MASS INDEX DOCD: CPT | Mod: CPTII,S$GLB,, | Performed by: INTERNAL MEDICINE

## 2018-09-21 PROCEDURE — 99205 OFFICE O/P NEW HI 60 MIN: CPT | Mod: S$GLB,,, | Performed by: RADIOLOGY

## 2018-09-21 PROCEDURE — 99205 OFFICE O/P NEW HI 60 MIN: CPT | Mod: S$GLB,,, | Performed by: INTERNAL MEDICINE

## 2018-09-21 RX ORDER — DEXAMETHASONE 6 MG/1
TABLET ORAL
Qty: 24 TABLET | Refills: 0 | Status: SHIPPED | OUTPATIENT
Start: 2018-09-21 | End: 2019-01-22

## 2018-09-21 RX ORDER — OLANZAPINE 10 MG/1
TABLET ORAL
Qty: 15 TABLET | Refills: 2 | Status: SHIPPED | OUTPATIENT
Start: 2018-09-21 | End: 2019-01-22

## 2018-09-21 RX ORDER — PROMETHAZINE HYDROCHLORIDE 6.25 MG/5ML
25 SYRUP ORAL EVERY 6 HOURS PRN
Qty: 473 ML | Refills: 6 | Status: SHIPPED | OUTPATIENT
Start: 2018-09-21 | End: 2019-01-22

## 2018-09-21 RX ORDER — ONDANSETRON 8 MG/1
8 TABLET, ORALLY DISINTEGRATING ORAL EVERY 6 HOURS PRN
Qty: 60 TABLET | Refills: 4 | Status: SHIPPED | OUTPATIENT
Start: 2018-09-21 | End: 2019-01-22

## 2018-09-21 NOTE — PROGRESS NOTES
REASON FOR VISIT:  New diagnosis of tonsil cancer.    REFERRING PHYSICIAN:  Serge Mccray M.D., with Head and Neck Surgical Oncology.    HISTORY OF PRESENT ILLNESS:  Mr. Frankie Hoyt is a 48-year-old smoker and   tobacco chewer who had waxing and waning neck mass for the last 18 months.  He   underwent a CT scan in February 2017 that was read as negative.  He noticed that   his left cheek swelling really got worse in July 2018.  He was seen in Urgent   Care and received antibiotics without any improvement.  Then he saw Dr. Franks   and underwent FNA of the left neck, which was positive for malignant cells.  She   also noted left tonsillar asymmetry.  CT neck at this time showed a mass in the   left neck at the level of the angle of the mandible, which represented an   enlarged lymph node measuring 3.9 x 2.4 x 5.2 cm, mass demonstrated irregular   margins in keeping with extracapsular extension.  There was encasement of the   external carotid artery and internal jugular vein with less than 180-degree   abutment of the internal and distal common carotid artery.  There was prominence   of the left palatine tonsil as well as fullness of the left vallecula.  He was   seen by Dr. Mccray and underwent a PET scan, which showed a hypermetabolic left   cervical mass and bilateral palatine tonsils.  There was a low level uptake in   the borderline right-sided cervical lymph node and no evidence of distant   metastasis.  He also saw Dr. Ontiveros today.  He has already seen Dr. Ortiz in   Psychology as well as Trenton Perez.  He denies any trouble swallowing or pain in   his throat.  He does note some pain in his left-sided lymph node area.    REVIEW OF SYSTEMS:  GENERAL:  Denies any chills, fevers, weight loss, or loss of appetite.  HEENT:  Negative for mouth sores.  EYES:  Negative for visual disturbance.  RESPIRATORY:  Negative for cough and shortness of breath.  CARDIOVASCULAR:  Negative for chest pain.  GASTROINTESTINAL:   Negative for diarrhea.  GENITOURINARY:  Negative for frequency.  MUSCULOSKELETAL:  Negative for back pain.  SKIN:  Negative for rash.  NEUROLOGIC:  Negative for headaches.  HEMATOLOGIC:  Negative for adenopathy.  PSYCHIATRIC AND BEHAVIORAL:  He is not nervous or anxious.    COMORBID MEDICAL CONDITIONS:  Include anxiety and diverticular disease.    PAST SURGICAL HISTORY:  Colonoscopy.    FAMILY HISTORY:  Father  with colon and prostate cancer.    SOCIAL HISTORY:  He is a former smoker, quit in , smoked one per weekend.    Denies any alcohol use.    PHYSICAL EXAMINATION:  VITAL SIGNS:  Reviewed in EPIC.  GENERAL:  The patient is oriented to person, place and time.  HEENT:  Right and left ears are normal.  The left tonsil is enlarged.  There is   left-sided 6 cervical lymph nodes measuring approximately 5 cm.  No other   cervical lymph node was palpated.  Teeth, multiple cavities noted.  No sinus   tenderness.  Posterior pharynx was normal.  EYES:  Conjunctivae and lids are normal.  NECK:  Supple, no JVD, no thyromegaly.  CARDIOVASCULAR:  Normal rate, regular rhythm.  Normal heart sounds.  Intact   distal pulses.  No murmurs heard.  No edema or tenderness in the extremities.  PULMONARY AND CHEST:  Bilateral equal breath sounds heard, no rales, rhonchi or   wheezes.  ABDOMEN:  Soft, nontender, nondistended.  No hepatomegaly or splenomegaly.  MUSCULOSKELETAL:  Normal range of motion.  Gait is normal.  No clubbing or   cyanosis.  LYMPHADENOPATHY:  No submental, submandibular, cervical, or supraclavicular   lymph nodes noted.  NEUROLOGIC:  Alert and oriented to person, place and time.  Has normal strength,   normal reflexes.  No sensory deficit.  Gait is normal.  SKIN:  Warm, dry and intact.  No bruising, no lesions, no rashes, no cyanosis.    Nails show no clubbing.  PSYCHIATRIC:  Normal mood and affect.  Speech, behavior, judgment, thought   content, cognition and memory are normal.    LABORATORY DATA:  CBC  and CMP from 2017 are within normal limits.    IMAGING:  As discussed above.    ASSESSMENT AND PLAN:  Mr. Hoyt is a 48-year-old male with a new diagnosis of T2   N1 M0 of the left palatine tonsil cancer.  His P16 is pending at the time of   this dictation, but more than likely, he will be P16 positive based on his   presentation.  He has seen Dr. Mccray who has briefly discussed tonsillectomy   with him for the right tonsil to see if that can reduce the field of radiation.    He also has to see the dentist ASAP and I discussed this with him quite   extensively.  He does not need a feeding tube at this time, but we did discuss   the pros and cons of a PEG tube placement.  We will plan on treating him with   cisplatin with concurrent radiation as soon as dental plan is complete, as well   as Dr. Ontiveros is ready to begin treatment.  In the interim, hopefully he will have   his tonsillectomy as well as questions were answered to satisfaction.  Distress   score is 1 and no intervention is indicated.      SPS/HN  dd: 09/21/2018 15:01:15 (CDT)  td: 09/22/2018 03:23:33 (CDT)  Doc ID   #[id number]  Job ID #[id number]    CC:     Distress Score    Distress Score: 1        Practical Problems Physical Problems   : No Appearance: No   Housing: No Bathing / Dressing: No   Insurance / Financial: No Breathing: No    Transportation: No  Changes in Urination: No    Work / School: No  Constipation: No   Treatment Decisions: No  Diarrhea: No     Eating: No    Family Problems Fatigue: No    Dealing with Children: No Feeling Swollen: No    Dealing with Partner: No Fevers: No    Ability to Have Children: No  Getting Around: No    Family Health Issues: No  Indigestion: No     Memory / Concentration: No   Emotional Problems Mouth Sores: No    Depression: No  Nausea: No    Fears: No  Nose Dry / Congested: No    Nervousness: No  Pain: No    Sadness: No Sexual: No    Worry: Yes Skin Dry / Itchy: No    Loss of Interest in Usual Activities: No  Sleep: No     Substance Abuse: No    Spiritual/Religions Concerns Tingling in Hands / Feet: No   Spritual / Mandaen Concerns: No         Other Problems            Dictated: [id number]

## 2018-09-21 NOTE — PATIENT INSTRUCTIONS
Cisplatin injection  What is this medicine?  CISPLATIN (SIS mich tin) is a chemotherapy drug. It targets fast dividing cells, like cancer cells, and causes these cells to die. This medicine is used to treat many types of cancer like bladder, ovarian, and testicular cancers.  How should I use this medicine?  This drug is given as an infusion into a vein. It is administered in a hospital or clinic by a specially trained health care professional.  Talk to your pediatrician regarding the use of this medicine in children. Special care may be needed.  What side effects may I notice from receiving this medicine?  Side effects that you should report to your doctor or health care professional as soon as possible:  · allergic reactions like skin rash, itching or hives, swelling of the face, lips, or tongue  · signs of infection - fever or chills, cough, sore throat, pain or difficulty passing urine  · signs of decreased platelets or bleeding - bruising, pinpoint red spots on the skin, black, tarry stools, nosebleeds  · signs of decreased red blood cells - unusually weak or tired, fainting spells, lightheadedness  · breathing problems  · changes in hearing  · gout pain  · low blood counts - This drug may decrease the number of white blood cells, red blood cells and platelets. You may be at increased risk for infections and bleeding.  · nausea and vomiting  · pain, swelling, redness or irritation at the injection site  · pain, tingling, numbness in the hands or feet  · problems with balance, movement  · trouble passing urine or change in the amount of urine  Side effects that usually do not require medical attention (report to your doctor or health care professional if they continue or are bothersome):  · changes in vision  · loss of appetite  · metallic taste in the mouth or changes in taste  What may interact with this medicine?  · dofetilide  · foscarnet  · medicines for seizures  · medicines to increase blood counts like  filgrastim, pegfilgrastim, sargramostim  · probenecid  · pyridoxine used with altretamine  · rituximab  · some antibiotics like amikacin, gentamicin, neomycin, polymyxin B, streptomycin, tobramycin  · sulfinpyrazone  · vaccines  · zalcitabine  Talk to your doctor or health care professional before taking any of these medicines:  · acetaminophen  · aspirin  · ibuprofen  · ketoprofen  · naproxen  What if I miss a dose?  It is important not to miss a dose. Call your doctor or health care professional if you are unable to keep an appointment.  Where should I keep my medicine?  This drug is given in a hospital or clinic and will not be stored at home.  What should I tell my health care provider before I take this medicine?  They need to know if you have any of these conditions:  · blood disorders  · hearing problems  · kidney disease  · recent or ongoing radiation therapy  · an unusual or allergic reaction to cisplatin, carboplatin, other chemotherapy, other medicines, foods, dyes, or preservatives  · pregnant or trying to get pregnant  · breast-feeding  What should I watch for while using this medicine?  Your condition will be monitored carefully while you are receiving this medicine. You will need important blood work done while you are taking this medicine.  This drug may make you feel generally unwell. This is not uncommon, as chemotherapy can affect healthy cells as well as cancer cells. Report any side effects. Continue your course of treatment even though you feel ill unless your doctor tells you to stop.  In some cases, you may be given additional medicines to help with side effects. Follow all directions for their use.  Call your doctor or health care professional for advice if you get a fever, chills or sore throat, or other symptoms of a cold or flu. Do not treat yourself. This drug decreases your body's ability to fight infections. Try to avoid being around people who are sick.  This medicine may increase  your risk to bruise or bleed. Call your doctor or health care professional if you notice any unusual bleeding.  Be careful brushing and flossing your teeth or using a toothpick because you may get an infection or bleed more easily. If you have any dental work done, tell your dentist you are receiving this medicine.  Avoid taking products that contain aspirin, acetaminophen, ibuprofen, naproxen, or ketoprofen unless instructed by your doctor. These medicines may hide a fever.  Do not become pregnant while taking this medicine. Women should inform their doctor if they wish to become pregnant or think they might be pregnant. There is a potential for serious side effects to an unborn child. Talk to your health care professional or pharmacist for more information. Do not breast-feed an infant while taking this medicine.  Drink fluids as directed while you are taking this medicine. This will help protect your kidneys.  Call your doctor or health care professional if you get diarrhea. Do not treat yourself.  NOTE:This sheet is a summary. It may not cover all possible information. If you have questions about this medicine, talk to your doctor, pharmacist, or health care provider. Copyright© 2017 [employer]

## 2018-09-21 NOTE — LETTER
September 21, 2018      Serge Mccray MD  [address]  [facility] [address]           [facility]  [address]  [facility] [address]  Phone: [phone]          Patient: Frankie Hoyt   MR Number: [id number]   YOB: 1970   Date of Visit: 9/21/2018       Dear Dr. Serge Mccray:    Thank you for referring Frankie Hoyt to me for evaluation. Attached you will find relevant portions of my assessment and plan of care.    If you have questions, please do not hesitate to call me. I look forward to following Frankie Hoyt along with you.    Sincerely,    Nat Castillo MD    Enclosure  CC:  No Recipients    If you would like to receive this communication electronically, please contact [email or web address] or [phone] to request more information on [employer] Link access.    For providers and/or their staff who would like to refer a patient to [facility], please contact us through our one-stop-shop provider referral line, [facility], at [phone].    If you feel you have received this communication in error or would no longer like to receive these types of communications, please e-mail [email or web address]

## 2018-09-21 NOTE — LETTER
September 21, 2018      Serge Mccray MD  [address] [facility] [address]           [facility]  [address]  [facility] [address]  Phone: [phone]          Patient: Frankie Hoyt   MR Number: [id number]   YOB: 1970   Date of Visit: 9/21/2018       Dear Dr. Serge Mccray:    Thank you for referring Frankie Hoyt to me for evaluation. Attached you will find relevant portions of my assessment and plan of care.    If you have questions, please do not hesitate to call me. I look forward to following Frankie Hoyt along with you.    Sincerely,    Karlos Ontiveros MD    Enclosure  CC:  No Recipients    If you would like to receive this communication electronically, please contact [email or web address] or [phone] to request more information on [employer] Link access.    For providers and/or their staff who would like to refer a patient to [facility], please contact us through our one-stop-shop provider referral line, [facility], at [phone].    If you feel you have received this communication in error or would no longer like to receive these types of communications, please e-mail [email or web address]

## 2018-09-23 NOTE — ASSESSMENT & PLAN NOTE
Frankie Hoyt is a 48 y.o. 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 Bilateral tonsil cancers, while possible, are mathematically unlikely, and it is my suspicion that the right-sided uptake represents chronic inflammation rather than malignancy. I have reviewed his scans with him and his wife, and there is obliteration of the left IJV, along with apparent radiographic FRANSISCO involving the SCM. Further, the location of this neck mass is in the region of the spinal accessory nerve in level II. For these reasons, especially the FRANSISCO, it is doubtful that a surgery first strategy will actually save him any treatment, as he would probably need chemo-RT, and likely a full course, in the adjuvant setting. Given the potential, and likely, surgical morbidity from spinal accessory dysfunction and the loss of the SCM, plus the probable difficulty in obtaining wide clearance in the neck, I do not recommend TORS with neck dissection. I believe that his tumor is more amenable to chemo-RT (we discussed how the survival rates of both approaches are the same, based on available research).     A multidisciplinary evaluation has been arranged with other members of the Head and Neck Team.     Consultations are requested for evaluation and treatment of the patient's head and neck cancer with the following services:   1.  Radiation Oncology to discuss potential primary versus adjuvant therapy   2.  Medical Oncology to discuss concurrent therapy in either setting   3.  Psychosocial Oncology (Dr. Beckham) to assess distress and coping   3.  Speech and Language Pathology to evaluate speech and swallowing function and provide exercises to promote healthy swallowing during and after treatment   4.  Nutrition to assess the patient's current and future nutritional requirements   5.  We also recommended dentistry to determine if any dental work is required prior to radiotherapy  (if indicated) to minimize the chance of longterm complications.  He will contact his dentist.    After these consultations have been completed, the patient's case will be discussed at the Multidisciplinary Head and Neck Planning Conference.  A formal recommendation for treatment will be made at that time.  Time was allowed for questions, and all questions were answered to the apparent satisfaction of the patient and family.    It may make sense to perform a diagnostic right tonsillectomy, as the radiation fields may be able to be scaled back for unilateral disease. I will discuss this with Dr. Ontiveros and Dr. Castillo and get it on the schedule for next week, if they agree.

## 2018-09-24 ENCOUNTER — PATIENT MESSAGE (OUTPATIENT)
Dept: RADIATION ONCOLOGY | Facility: CLINIC | Age: 48
End: 2018-09-24

## 2018-09-25 ENCOUNTER — PATIENT MESSAGE (OUTPATIENT)
Dept: RADIATION ONCOLOGY | Facility: CLINIC | Age: 48
End: 2018-09-25

## 2018-09-25 ENCOUNTER — TELEPHONE (OUTPATIENT)
Dept: RADIATION ONCOLOGY | Facility: CLINIC | Age: 48
End: 2018-09-25

## 2018-09-25 NOTE — TELEPHONE ENCOUNTER
Left message for pt to confirm that only his wisdom teeth needed to be pulled tomorrow with the oral surgeon.

## 2018-09-28 ENCOUNTER — TELEPHONE (OUTPATIENT)
Dept: RADIATION ONCOLOGY | Facility: CLINIC | Age: 48
End: 2018-09-28

## 2018-09-28 ENCOUNTER — TELEPHONE (OUTPATIENT)
Dept: OTOLARYNGOLOGY | Facility: CLINIC | Age: 48
End: 2018-09-28

## 2018-09-28 DIAGNOSIS — C09.9 MALIGNANT TUMOR OF PALATINE TONSIL: Primary | ICD-10-CM

## 2018-09-28 RX ORDER — SODIUM CHLORIDE 0.9 % (FLUSH) 0.9 %
5 SYRINGE (ML) INJECTION
Status: CANCELLED | OUTPATIENT
Start: 2018-09-28

## 2018-09-28 RX ORDER — LIDOCAINE HYDROCHLORIDE 10 MG/ML
1 INJECTION, SOLUTION EPIDURAL; INFILTRATION; INTRACAUDAL; PERINEURAL ONCE
Status: CANCELLED | OUTPATIENT
Start: 2018-09-28 | End: 2018-09-28

## 2018-09-28 NOTE — ASSESSMENT & PLAN NOTE
See telephone note from today.  Have recommended right tonsillectomy as a diagnostic endeavor to determine potential for focusing therapy on the known left side. He stated that he understands and agrees with the plan.

## 2018-09-28 NOTE — TELEPHONE ENCOUNTER
I called the patient and discussed our recommendation. After discussing his case with Dr. Ontiveros and Dr. Castillo, our recommendation is to remove the right tonsil as a diagnostic endeavor because of the PET uptake. If it is negative, then we will be able to focus the IMRT on the left side of the pharynx and the left neck. We would, at that point, observe the right neck.    ----- Message from Adela Dailey MA sent at 9/28/2018  1:13 PM CDT -----  Contact: pt at [phone]      ----- Message -----  From: Martha Singer  Sent: 9/28/2018  12:56 PM  To: Mahendra Mccray pt-Pt was seen last Thursday September 20.  Pt saw a few doctors after that visit and they were supposed to get together to come up with a plan for this pt. Pt is calling for an update on this.

## 2018-09-28 NOTE — TELEPHONE ENCOUNTER
Spoke with pt to cancel Sim appointment for Monday per Dr. Ontiveros. He stated that Dr. Mccray was probably going to remove the tonsil and we will get the SIM after that surgery. Pt verbalized understanding.

## 2018-09-28 NOTE — TELEPHONE ENCOUNTER
----- Message from Lawanda Pang RN sent at 9/28/2018 11:44 AM CDT -----  Regarding: FW: CHEMO PENDING       ----- Message -----  From: Lewis Salgado  Sent: 9/28/2018   8:52 AM  To: Lawanda Pang RN, Dee Dee Ni RN  Subject: FW: CHEMO PENDING                                Can we keep me in the loop once dentist give approval to start treatment.Thanks!    ----- Message -----  From: Lawanda Pang RN  Sent: 9/24/2018   2:42 PM  To: Lewis Salgado  Subject: RE: CHEMO PENDING                                Waiting for dentist appt before [facility]  ----- Message -----  From: Lewis Salgado  Sent: 9/24/2018   2:20 PM  To: Lawanda Pang RN  Subject: FW: CHEMO PENDING                                Pt chemo is approved, can I get a possible start date for XRT, please advise.Thanks!    ----- Message -----  From: Caroline Castellano  Sent: 9/21/2018   3:08 PM  To: Lewis Salgado  Subject: CHEMO PENDING                                    Message   Received: Today   Message Contents   MD Lewis Quezada    Please get auth for cisplatin and then he will need to see me when he starts RT

## 2018-10-02 ENCOUNTER — TELEPHONE (OUTPATIENT)
Dept: OTOLARYNGOLOGY | Facility: CLINIC | Age: 48
End: 2018-10-02

## 2018-10-03 ENCOUNTER — ANESTHESIA (OUTPATIENT)
Dept: SURGERY | Facility: HOSPITAL | Age: 48
End: 2018-10-03
Payer: COMMERCIAL

## 2018-10-03 ENCOUNTER — NURSE TRIAGE (OUTPATIENT)
Dept: ADMINISTRATIVE | Facility: CLINIC | Age: 48
End: 2018-10-03

## 2018-10-03 ENCOUNTER — HOSPITAL ENCOUNTER (OUTPATIENT)
Facility: HOSPITAL | Age: 48
Discharge: HOME | End: 2018-10-03
Attending: OTOLARYNGOLOGY | Admitting: OTOLARYNGOLOGY
Payer: COMMERCIAL

## 2018-10-03 ENCOUNTER — ANESTHESIA EVENT (OUTPATIENT)
Dept: SURGERY | Facility: HOSPITAL | Age: 48
End: 2018-10-03
Payer: COMMERCIAL

## 2018-10-03 DIAGNOSIS — C09.9 MALIGNANT TUMOR OF PALATINE TONSIL: Primary | ICD-10-CM

## 2018-10-03 PROCEDURE — 25000003 PHARM REV CODE 250: Performed by: NURSE ANESTHETIST, CERTIFIED REGISTERED

## 2018-10-03 PROCEDURE — 71000039 HC RECOVERY, EACH ADD'L HOUR: Performed by: OTOLARYNGOLOGY

## 2018-10-03 PROCEDURE — 27000221 HC OXYGEN, UP TO 24 HOURS

## 2018-10-03 PROCEDURE — 88304 TISSUE EXAM BY PATHOLOGIST: CPT | Mod: 26,,, | Performed by: PATHOLOGY

## 2018-10-03 PROCEDURE — 71000033 HC RECOVERY, INTIAL HOUR: Performed by: OTOLARYNGOLOGY

## 2018-10-03 PROCEDURE — 63600175 PHARM REV CODE 636 W HCPCS: Performed by: NURSE ANESTHETIST, CERTIFIED REGISTERED

## 2018-10-03 PROCEDURE — 42826 REMOVAL OF TONSILS: CPT | Mod: ,,, | Performed by: OTOLARYNGOLOGY

## 2018-10-03 PROCEDURE — 36000709 HC OR TIME LEV III EA ADD 15 MIN: Performed by: OTOLARYNGOLOGY

## 2018-10-03 PROCEDURE — D9220A PRA ANESTHESIA: Mod: CRNA,,, | Performed by: NURSE ANESTHETIST, CERTIFIED REGISTERED

## 2018-10-03 PROCEDURE — D9220A PRA ANESTHESIA: Mod: ANES,,, | Performed by: ANESTHESIOLOGY

## 2018-10-03 PROCEDURE — 63600175 PHARM REV CODE 636 W HCPCS

## 2018-10-03 PROCEDURE — 31536 LARYNGOSCOPY W/BX & OP SCOPE: CPT | Mod: 51,,, | Performed by: OTOLARYNGOLOGY

## 2018-10-03 PROCEDURE — 37000009 HC ANESTHESIA EA ADD 15 MINS: Performed by: OTOLARYNGOLOGY

## 2018-10-03 PROCEDURE — 25000003 PHARM REV CODE 250: Performed by: OTOLARYNGOLOGY

## 2018-10-03 PROCEDURE — 63600175 PHARM REV CODE 636 W HCPCS: Performed by: ANESTHESIOLOGY

## 2018-10-03 PROCEDURE — 25000003 PHARM REV CODE 250

## 2018-10-03 PROCEDURE — 71000015 HC POSTOP RECOV 1ST HR: Performed by: OTOLARYNGOLOGY

## 2018-10-03 PROCEDURE — 88304 TISSUE EXAM BY PATHOLOGIST: CPT | Performed by: PATHOLOGY

## 2018-10-03 PROCEDURE — 36000708 HC OR TIME LEV III 1ST 15 MIN: Performed by: OTOLARYNGOLOGY

## 2018-10-03 PROCEDURE — 37000008 HC ANESTHESIA 1ST 15 MINUTES: Performed by: OTOLARYNGOLOGY

## 2018-10-03 PROCEDURE — 94761 N-INVAS EAR/PLS OXIMETRY MLT: CPT

## 2018-10-03 RX ORDER — KETAMINE HYDROCHLORIDE 10 MG/ML
INJECTION, SOLUTION INTRAMUSCULAR; INTRAVENOUS
Status: DISCONTINUED | OUTPATIENT
Start: 2018-10-03 | End: 2018-10-03

## 2018-10-03 RX ORDER — OXYCODONE AND ACETAMINOPHEN 7.5; 325 MG/1; MG/1
1 TABLET ORAL EVERY 4 HOURS PRN
Qty: 20 TABLET | Refills: 0 | Status: SHIPPED | OUTPATIENT
Start: 2018-10-03 | End: 2018-10-09

## 2018-10-03 RX ORDER — GLYCOPYRROLATE 0.2 MG/ML
INJECTION INTRAMUSCULAR; INTRAVENOUS
Status: DISCONTINUED | OUTPATIENT
Start: 2018-10-03 | End: 2018-10-03

## 2018-10-03 RX ORDER — MIDAZOLAM HYDROCHLORIDE 1 MG/ML
INJECTION, SOLUTION INTRAMUSCULAR; INTRAVENOUS
Status: DISCONTINUED | OUTPATIENT
Start: 2018-10-03 | End: 2018-10-03

## 2018-10-03 RX ORDER — SUCCINYLCHOLINE CHLORIDE 20 MG/ML
INJECTION INTRAMUSCULAR; INTRAVENOUS
Status: DISCONTINUED | OUTPATIENT
Start: 2018-10-03 | End: 2018-10-03

## 2018-10-03 RX ORDER — LIDOCAINE HCL/PF 100 MG/5ML
SYRINGE (ML) INTRAVENOUS
Status: DISCONTINUED | OUTPATIENT
Start: 2018-10-03 | End: 2018-10-03

## 2018-10-03 RX ORDER — DEXAMETHASONE SODIUM PHOSPHATE 4 MG/ML
INJECTION, SOLUTION INTRA-ARTICULAR; INTRALESIONAL; INTRAMUSCULAR; INTRAVENOUS; SOFT TISSUE
Status: DISCONTINUED | OUTPATIENT
Start: 2018-10-03 | End: 2018-10-03

## 2018-10-03 RX ORDER — NEOSTIGMINE METHYLSULFATE 1 MG/ML
INJECTION, SOLUTION INTRAVENOUS
Status: DISCONTINUED | OUTPATIENT
Start: 2018-10-03 | End: 2018-10-03

## 2018-10-03 RX ORDER — FENTANYL CITRATE 50 UG/ML
25 INJECTION, SOLUTION INTRAMUSCULAR; INTRAVENOUS EVERY 5 MIN PRN
Status: COMPLETED | OUTPATIENT
Start: 2018-10-03 | End: 2018-10-03

## 2018-10-03 RX ORDER — OXYCODONE AND ACETAMINOPHEN 5; 325 MG/1; MG/1
1 TABLET ORAL ONCE
Status: COMPLETED | OUTPATIENT
Start: 2018-10-03 | End: 2018-10-03

## 2018-10-03 RX ORDER — ONDANSETRON 2 MG/ML
INJECTION INTRAMUSCULAR; INTRAVENOUS
Status: DISCONTINUED | OUTPATIENT
Start: 2018-10-03 | End: 2018-10-03

## 2018-10-03 RX ORDER — FENTANYL CITRATE 50 UG/ML
INJECTION, SOLUTION INTRAMUSCULAR; INTRAVENOUS
Status: COMPLETED
Start: 2018-10-03 | End: 2018-10-03

## 2018-10-03 RX ORDER — LIDOCAINE HYDROCHLORIDE 20 MG/ML
INJECTION, SOLUTION INFILTRATION; PERINEURAL
Status: DISCONTINUED
Start: 2018-10-03 | End: 2018-10-03 | Stop reason: WASHOUT

## 2018-10-03 RX ORDER — LIDOCAINE HYDROCHLORIDE 10 MG/ML
1 INJECTION, SOLUTION EPIDURAL; INFILTRATION; INTRACAUDAL; PERINEURAL ONCE
Status: COMPLETED | OUTPATIENT
Start: 2018-10-03 | End: 2018-10-03

## 2018-10-03 RX ORDER — ONDANSETRON 8 MG/1
8 TABLET, ORALLY DISINTEGRATING ORAL EVERY 6 HOURS PRN
Qty: 10 TABLET | Refills: 0 | Status: SHIPPED | OUTPATIENT
Start: 2018-10-03 | End: 2019-01-07

## 2018-10-03 RX ORDER — SODIUM CHLORIDE 0.9 % (FLUSH) 0.9 %
3 SYRINGE (ML) INJECTION
Status: DISCONTINUED | OUTPATIENT
Start: 2018-10-03 | End: 2018-10-03 | Stop reason: HOSPADM

## 2018-10-03 RX ORDER — PROPOFOL 10 MG/ML
VIAL (ML) INTRAVENOUS
Status: DISCONTINUED | OUTPATIENT
Start: 2018-10-03 | End: 2018-10-03

## 2018-10-03 RX ORDER — OXYMETAZOLINE HCL 0.05 %
SPRAY, NON-AEROSOL (ML) NASAL
Status: DISCONTINUED
Start: 2018-10-03 | End: 2018-10-03 | Stop reason: WASHOUT

## 2018-10-03 RX ORDER — SODIUM CHLORIDE 9 MG/ML
1000 INJECTION, SOLUTION INTRAVENOUS CONTINUOUS
Status: DISCONTINUED | OUTPATIENT
Start: 2018-10-03 | End: 2018-10-03 | Stop reason: HOSPADM

## 2018-10-03 RX ORDER — OXYCODONE AND ACETAMINOPHEN 7.5; 325 MG/1; MG/1
1 TABLET ORAL EVERY 6 HOURS PRN
COMMUNITY
End: 2018-10-09

## 2018-10-03 RX ORDER — OXYCODONE AND ACETAMINOPHEN 5; 325 MG/1; MG/1
TABLET ORAL
Status: COMPLETED
Start: 2018-10-03 | End: 2018-10-03

## 2018-10-03 RX ORDER — FENTANYL CITRATE 50 UG/ML
25 INJECTION, SOLUTION INTRAMUSCULAR; INTRAVENOUS EVERY 5 MIN PRN
Status: DISCONTINUED | OUTPATIENT
Start: 2018-10-03 | End: 2018-10-03 | Stop reason: HOSPADM

## 2018-10-03 RX ORDER — KETAMINE HCL IN 0.9 % NACL 50 MG/5 ML
SYRINGE (ML) INTRAVENOUS
Status: DISCONTINUED
Start: 2018-10-03 | End: 2018-10-03 | Stop reason: HOSPADM

## 2018-10-03 RX ORDER — PHENYLEPHRINE HYDROCHLORIDE 10 MG/ML
INJECTION INTRAVENOUS
Status: DISCONTINUED | OUTPATIENT
Start: 2018-10-03 | End: 2018-10-03

## 2018-10-03 RX ORDER — ROCURONIUM BROMIDE 10 MG/ML
INJECTION, SOLUTION INTRAVENOUS
Status: DISCONTINUED | OUTPATIENT
Start: 2018-10-03 | End: 2018-10-03

## 2018-10-03 RX ORDER — FENTANYL CITRATE 50 UG/ML
INJECTION, SOLUTION INTRAMUSCULAR; INTRAVENOUS
Status: DISCONTINUED | OUTPATIENT
Start: 2018-10-03 | End: 2018-10-03

## 2018-10-03 RX ORDER — SODIUM CHLORIDE 0.9 % (FLUSH) 0.9 %
5 SYRINGE (ML) INJECTION
Status: DISCONTINUED | OUTPATIENT
Start: 2018-10-03 | End: 2018-10-03 | Stop reason: HOSPADM

## 2018-10-03 RX ADMIN — FENTANYL CITRATE 25 MCG: 50 INJECTION INTRAMUSCULAR; INTRAVENOUS at 06:10

## 2018-10-03 RX ADMIN — ONDANSETRON 4 MG: 2 INJECTION INTRAMUSCULAR; INTRAVENOUS at 05:10

## 2018-10-03 RX ADMIN — SODIUM CHLORIDE, SODIUM GLUCONATE, SODIUM ACETATE, POTASSIUM CHLORIDE, MAGNESIUM CHLORIDE, SODIUM PHOSPHATE, DIBASIC, AND POTASSIUM PHOSPHATE: .53; .5; .37; .037; .03; .012; .00082 INJECTION, SOLUTION INTRAVENOUS at 05:10

## 2018-10-03 RX ADMIN — DEXAMETHASONE SODIUM PHOSPHATE 8 MG: 4 INJECTION, SOLUTION INTRAMUSCULAR; INTRAVENOUS at 05:10

## 2018-10-03 RX ADMIN — PROPOFOL 270 MG: 10 INJECTION, EMULSION INTRAVENOUS at 04:10

## 2018-10-03 RX ADMIN — PROPOFOL 30 MG: 10 INJECTION, EMULSION INTRAVENOUS at 05:10

## 2018-10-03 RX ADMIN — ROCURONIUM BROMIDE 15 MG: 10 INJECTION, SOLUTION INTRAVENOUS at 04:10

## 2018-10-03 RX ADMIN — ROCURONIUM BROMIDE 15 MG: 10 INJECTION, SOLUTION INTRAVENOUS at 05:10

## 2018-10-03 RX ADMIN — SODIUM CHLORIDE 1000 ML: 0.9 INJECTION, SOLUTION INTRAVENOUS at 03:10

## 2018-10-03 RX ADMIN — LIDOCAINE HYDROCHLORIDE 10 MG: 10 INJECTION, SOLUTION EPIDURAL; INFILTRATION; INTRACAUDAL; PERINEURAL at 03:10

## 2018-10-03 RX ADMIN — OXYCODONE AND ACETAMINOPHEN 1 TABLET: 5; 325 TABLET ORAL at 06:10

## 2018-10-03 RX ADMIN — MIDAZOLAM HYDROCHLORIDE 3 MG: 1 INJECTION, SOLUTION INTRAMUSCULAR; INTRAVENOUS at 04:10

## 2018-10-03 RX ADMIN — NEOSTIGMINE METHYLSULFATE 5 MG: 1 INJECTION INTRAVENOUS at 05:10

## 2018-10-03 RX ADMIN — KETAMINE HYDROCHLORIDE 35 MG: 10 INJECTION, SOLUTION INTRAMUSCULAR; INTRAVENOUS at 04:10

## 2018-10-03 RX ADMIN — SUCCINYLCHOLINE CHLORIDE 140 MG: 20 INJECTION, SOLUTION INTRAMUSCULAR; INTRAVENOUS at 04:10

## 2018-10-03 RX ADMIN — MIDAZOLAM HYDROCHLORIDE 1 MG: 1 INJECTION, SOLUTION INTRAMUSCULAR; INTRAVENOUS at 04:10

## 2018-10-03 RX ADMIN — GLYCOPYRROLATE 0.6 MG: 0.2 INJECTION, SOLUTION INTRAMUSCULAR; INTRAVENOUS at 05:10

## 2018-10-03 RX ADMIN — FENTANYL CITRATE 150 MCG: 50 INJECTION, SOLUTION INTRAMUSCULAR; INTRAVENOUS at 04:10

## 2018-10-03 RX ADMIN — PHENYLEPHRINE HYDROCHLORIDE 100 MCG: 10 INJECTION INTRAVENOUS at 05:10

## 2018-10-03 RX ADMIN — OXYCODONE HYDROCHLORIDE AND ACETAMINOPHEN 1 TABLET: 5; 325 TABLET ORAL at 06:10

## 2018-10-03 RX ADMIN — LIDOCAINE HYDROCHLORIDE 100 MG: 20 INJECTION, SOLUTION INTRAVENOUS at 04:10

## 2018-10-03 NOTE — H&P
[facility]  Otorhinolaryngology-Head & Neck Surgery  History & Physical    Patient Name: Frankie Hoyt  MRN: [id number]  Admission Date: 10/3/2018  Attending Physician: Serge Mccray MD   Primary Care Provider: Natasha Wilkins NP    Patient information was obtained from patient, spouse/SO and past medical records.     Subjective:     Chief Complaint/Reason for Admission: tonsillectomy, DLB    History of Present Illness: No notes on file    HEAD AND NECK SURGICAL ONCOLOGY CLINIC     Subjective:       Patient ID: Frankie Hoyt is a 48 y.o. male.     Chief Complaint: Consult (SCC tonsil)     HPI  Oncology History:           Malignant tumor of palatine tonsil     9/18/2018 Biopsy       FNA =   Supplemental Diagnosis  Cellblock confirms squamous carcinoma with necrosis  Immunostain for p16 is positive. The controls stain appropriately            9/19/2018 Initial Diagnosis       Malignant tumor of palatine tonsil            9/22/2018 Tumor Markers       Patient's tumor was tested for the following markers: p16.                                              Results of the tumor marker test revealed positive             Frankie Hoyt is a 48 y.o. male who presents with a 1.5 year history of a left neck mass. He detected something in late 2016, and sought evaluation for this in February of 2017, at which time he had a CT scan. There was no abnormality detected then, so he went about his business. Since July of 2018, he noticed that his face was slightly swollen in front of his ear, along with some tenderness. He also notes that the left neck mass had fluctuated over time but seemed to get bigger of late. He went to urgent care, and was given 10 days of antibiotics, which he took without improvement. He then returned to his [facility] and was referred to Dr. Franks, who has referred him here.      He denies dysphagia, odynophagia, throat pain, and otalgia. He will get some ear tenderness when he pokes in his ear  canal. His voice is normal. There is no hemoptysis or hematemesis. He is breathing well.          Past Medical History:   Diagnosis Date    Abdominal pain, LLQ (left lower quadrant) 2012    Anxiety      Diverticular disease                Past Surgical History:   Procedure Laterality Date    COLONOSCOPY       colonscopy       tonsil biopsy 2018                Current Outpatient Medications:     sildenafil (VIAGRA) 100 MG tablet, Take 1 tablet (100 mg total) by mouth daily as needed for Erectile Dysfunction., Disp: 6 tablet, Rfl: 1    sodium chloride (SALINE NASAL) 0.65 % nasal spray, 2 sprays by Nasal route 2 (two) times daily., Disp: , Rfl: 12    dexamethasone (DECADRON) 6 MG tablet, Take 1 tablet twice daily for 4 days after chemo, Disp: 24 tablet, Rfl: 0    OLANZapine (ZYPREXA) 10 MG tablet, Take for 4 days after chemo, start on the day of chemo, Disp: 15 tablet, Rfl: 2    ondansetron (ZOFRAN-ODT) 8 MG TbDL, Take 1 tablet (8 mg total) by mouth every 6 (six) hours as needed., Disp: 60 tablet, Rfl: 4    promethazine (PHENERGAN) 6.25 mg/5 mL syrup, Take 20 mLs (25 mg total) by mouth every 6 (six) hours as needed for Nausea., Disp: 473 mL, Rfl: 6     Review of patient's allergies indicates:  No Known Allergies     Social History               Socioeconomic History    Marital status:        Spouse name: Not on file    Number of children: Not on file    Years of education: Not on file    Highest education level: Not on file   Social Needs    Financial resource strain: Not on file    Food insecurity - worry: Not on file    Food insecurity - inability: Not on file    Transportation needs - medical: Not on file    Transportation needs - non-medical: Not on file   Occupational History    Not on file   Tobacco Use    Smoking status: Former Smoker       Types: Cigars       Last attempt to quit:        Years since quittin.7    Smokeless tobacco: Former User    Tobacco  comment: 1 per weekend   Substance and Sexual Activity    Alcohol use: Yes       Alcohol/week: 7.2 oz       Types: 12 Cans of beer per week       Comment: weekend    Drug use: No    Sexual activity: Yes       Partners: Female   Other Topics Concern    Patient feels they ought to cut down on drinking/drug use Not Asked    Patient annoyed by others criticizing their drinking/drug use Not Asked    Patient has felt bad or guilty about drinking/drug use Not Asked    Patient has had a drink/used drugs as an eye opener in the AM Not Asked   Social History Narrative      Frankie Hoyt lives with his wife and 2 younger children in [place], [place].  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  His spouse is Kym.   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 Frankie Hoyt's hobbies include working out                  Family History   Problem Relation Age of Onset    Cancer Father           colon and prostate cancer    Heart disease Father           Review of Systems   Constitutional: Negative for fatigue, fever and unexpected weight change.   HENT: Negative for ear discharge, facial swelling, hearing loss, mouth sores, rhinorrhea, sore throat, tinnitus, trouble swallowing and voice change.    Eyes: Negative for pain and visual disturbance.   Respiratory: Negative for cough and shortness of breath.    Cardiovascular: Negative for chest pain and palpitations.   Gastrointestinal: Negative for abdominal pain, constipation and diarrhea.   Genitourinary: Negative for difficulty urinating and dysuria.   Musculoskeletal: Positive for arthralgias. Negative for back pain and neck pain.   Skin: Negative for color change and rash.   Neurological: Negative for dizziness, seizures and headaches.   Hematological: Positive for adenopathy. Does not bruise/bleed easily.   Psychiatric/Behavioral: Negative for agitation. The patient  is not nervous/anxious.        Objective:     Physical Exam   Constitutional: He is oriented to person, place, and time. He appears well-developed and well-nourished. He is cooperative.   HENT:   Head: Normocephalic and atraumatic.   Right Ear: Hearing, tympanic membrane, external ear and ear canal normal.   Left Ear: Hearing, tympanic membrane, external ear and ear canal normal.   Nose: Nose normal. No rhinorrhea, nasal deformity or septal deviation. Right sinus exhibits no maxillary sinus tenderness and no frontal sinus tenderness. Left sinus exhibits no maxillary sinus tenderness and no frontal sinus tenderness.   Mouth/Throat: Uvula is midline, oropharynx is clear and moist and mucous membranes are normal. He does not have dentures. No oral lesions. No trismus in the jaw. No oropharyngeal exudate or posterior oropharyngeal edema.       .   Eyes: Conjunctivae and EOM are normal. Pupils are equal, round, and reactive to light. Right eye exhibits no chemosis. Left eye exhibits no chemosis.   Neck: Trachea normal, normal range of motion and phonation normal. Neck supple. No JVD present. No tracheal tenderness present. No tracheal deviation and no edema present. No thyroid mass and no thyromegaly present.       Salivary glands - there are no lesions, and there is no asymmetry of the parotid and submandibular glands   Cardiovascular: Normal rate, regular rhythm and intact distal pulses.   Pulmonary/Chest: Effort normal. No accessory muscle usage or stridor. No tachypnea. No respiratory distress.   Abdominal: Normal appearance. He exhibits no distension. There is no guarding.   Lymphadenopathy:     He has cervical adenopathy.        Right cervical: No deep cervical and no posterior cervical adenopathy present.       Left cervical: Deep cervical adenopathy present. No posterior cervical adenopathy present.        Right: No supraclavicular adenopathy present.        Left: No supraclavicular adenopathy present.   Left  level II   Neurological: He is alert and oriented to person, place, and time. No cranial nerve deficit. Gait normal.   Skin: Skin is warm and dry. No lesion noted.   Psychiatric: He has a normal mood and affect. His speech is normal.   Vitals reviewed.          FNA 9/18/2018  Supplemental Diagnosis  Cellblock confirms squamous carcinoma with necrosis     CT Neck 9/14/2018:       FINDINGS:  There is a mass within the left neck at the level of the angle of the mandible which likely represents an enlarged lymph node or lymph node conglomerate, measuring 3.9 x 2.4 x 5.2 cm.  Mass demonstrates irregular margins in keeping with extracapsular extension.  There is encasement of the external carotid artery and internal jugular vein with less than 180 degree abutment of the internal and distal common carotid artery.  There is non visualization of the left internal jugular vein, probably occluded.    There is prominence of the left palatine tonsil as well as fullness of the left vallecula.    Salivary glands are unremarkable.  Thyroid gland is unremarkable.    Lung apices are clear.    Relatively hypodense appearance of the superior T1 vertebral body probably artifactual.             Assessment/Plan:     Malignant tumor of palatine tonsil    Impression         Left neck mass likely representing enlarged lymph node or lymph node conglomerate with extracapsular extension and vascular encasement.  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  Prominence of the left palatine tonsil and fullness of the left vallecula.  Recommend correlation with visual inspection as well as PET-CT.    Relatively hypodense appearance of the superior T1 vertebral body, probably artifactual.      PET-CT 9/19/2018:  Impression         Hypermetabolic left cervical mass and palatine tonsils.  Low level uptake within a borderline sized right cervical node.      Assessment & Plan:           Problem List Items Addressed This Visit      Malignant  tumor of palatine tonsil - Primary       Frankie Hoyt is a 48 y.o. 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 Bilateral tonsil cancers, while possible, are mathematically unlikely, and it is my suspicion that the right-sided uptake represents chronic inflammation rather than malignancy. I have reviewed his scans with him and his wife, and there is obliteration of the left IJV, along with apparent radiographic FRANSISCO involving the SCM. Further, the location of this neck mass is in the region of the spinal accessory nerve in level II. For these reasons, especially the FRANSISCO, it is doubtful that a surgery first strategy will actually save him any treatment, as he would probably need chemo-RT, and likely a full course, in the adjuvant setting. Given the potential, and likely, surgical morbidity from spinal accessory dysfunction and the loss of the SCM, plus the probable difficulty in obtaining wide clearance in the neck, I do not recommend TORS with neck dissection. I believe that his tumor is more amenable to chemo-RT (we discussed how the survival rates of both approaches are the same, based on available research).      A multidisciplinary evaluation has been arranged with other members of the Head and Neck Team.      Consultations are requested for evaluation and treatment of the patient's head and neck cancer with the following services:              1.  Radiation Oncology to discuss potential primary versus adjuvant therapy              2.  Medical Oncology to discuss concurrent therapy in either setting              3.  Psychosocial Oncology (Dr. Beckham) to assess distress and coping              3.  Speech and Language Pathology to evaluate speech and swallowing function and provide exercises to promote healthy swallowing during and after treatment              4.  Nutrition to assess the patient's current and future nutritional requirements               5.  We also recommended dentistry to determine if any dental work is required prior to radiotherapy (if indicated) to minimize the chance of longterm complications.  He will contact his dentist.     After these consultations have been completed, the patient's case will be discussed at the Multidisciplinary Head and Neck Planning Conference.  A formal recommendation for treatment will be made at that time.  Time was allowed for questions, and all questions were answered to the apparent satisfaction of the patient and family.   Proceed with right tonsillectomy and DLB.                      VTE Risk Mitigation (From admission, onward)        Ordered     Place sequential compression device  Until discontinued      10/03/18 1440     Place ANA hose  Until discontinued      10/03/18 1440          Dixie Galindo MD  Otorhinolaryngology-Head & Neck Surgery  [facility]

## 2018-10-03 NOTE — TRANSFER OF CARE
Anesthesia Transfer of Care Note    Patient: Frankie Hoyt    Procedure(s) Performed: Procedure(s) (LRB):  TONSILLECTOMY (Right)  LARYNGOSCOPY (N/A)    Patient location: PACU    Anesthesia Type: general    Transport from OR: Transported from OR on room air with adequate spontaneous ventilation. Transported from OR on 6-10 L/min O2 by face mask with adequate spontaneous ventilation. Continuous SpO2 monitoring in transport. Continuous ECG monitoring in transport    Post pain: adequate analgesia    Post assessment: no apparent anesthetic complications and tolerated procedure well    Post vital signs: stable    Level of consciousness: awake and alert    Nausea/Vomiting: no nausea/vomiting    Complications: none    Transfer of care protocol was followed      Last vitals:   Visit Vitals  BP (!) 149/72 (BP Location: Right arm, Patient Position: Lying)   Pulse 74   Temp 36.8 °C (98.2 °F) (Temporal)   Resp 12   Ht 6' (1.829 m)   Wt 117.9 kg (260 lb)   SpO2 100%   BMI 35.26 kg/m²

## 2018-10-03 NOTE — SUBJECTIVE & OBJECTIVE
HEAD AND NECK SURGICAL ONCOLOGY CLINIC     Subjective:       Patient ID: Frankie Hoyt is a 48 y.o. male.     Chief Complaint: Consult (SCC tonsil)     HPI  Oncology History:           Malignant tumor of palatine tonsil     9/18/2018 Biopsy       FNA =   Supplemental Diagnosis  Cellblock confirms squamous carcinoma with necrosis  Immunostain for p16 is positive. The controls stain appropriately            9/19/2018 Initial Diagnosis       Malignant tumor of palatine tonsil            9/22/2018 Tumor Markers       Patient's tumor was tested for the following markers: p16.                                              Results of the tumor marker test revealed positive             Frankie Hoyt is a 48 y.o. male who presents with a 1.5 year history of a left neck mass. He detected something in late 2016, and sought evaluation for this in February of 2017, at which time he had a CT scan. There was no abnormality detected then, so he went about his business. Since July of 2018, he noticed that his face was slightly swollen in front of his ear, along with some tenderness. He also notes that the left neck mass had fluctuated over time but seemed to get bigger of late. He went to urgent care, and was given 10 days of antibiotics, which he took without improvement. He then returned to his [facility] and was referred to Dr. Franks, who has referred him here.      He denies dysphagia, odynophagia, throat pain, and otalgia. He will get some ear tenderness when he pokes in his ear canal. His voice is normal. There is no hemoptysis or hematemesis. He is breathing well.          Past Medical History:   Diagnosis Date    Abdominal pain, LLQ (left lower quadrant) 12/2012    Anxiety      Diverticular disease 2012               Past Surgical History:   Procedure Laterality Date    COLONOSCOPY   2014    colonscopy   2012    tonsil biopsy 2018                Current Outpatient Medications:     sildenafil (VIAGRA) 100 MG tablet, Take 1  tablet (100 mg total) by mouth daily as needed for Erectile Dysfunction., Disp: 6 tablet, Rfl: 1    sodium chloride (SALINE NASAL) 0.65 % nasal spray, 2 sprays by Nasal route 2 (two) times daily., Disp: , Rfl: 12    dexamethasone (DECADRON) 6 MG tablet, Take 1 tablet twice daily for 4 days after chemo, Disp: 24 tablet, Rfl: 0    OLANZapine (ZYPREXA) 10 MG tablet, Take for 4 days after chemo, start on the day of chemo, Disp: 15 tablet, Rfl: 2    ondansetron (ZOFRAN-ODT) 8 MG TbDL, Take 1 tablet (8 mg total) by mouth every 6 (six) hours as needed., Disp: 60 tablet, Rfl: 4    promethazine (PHENERGAN) 6.25 mg/5 mL syrup, Take 20 mLs (25 mg total) by mouth every 6 (six) hours as needed for Nausea., Disp: 473 mL, Rfl: 6     Review of patient's allergies indicates:  No Known Allergies     Social History               Socioeconomic History    Marital status:        Spouse name: Not on file    Number of children: Not on file    Years of education: Not on file    Highest education level: Not on file   Social Needs    Financial resource strain: Not on file    Food insecurity - worry: Not on file    Food insecurity - inability: Not on file    Transportation needs - medical: Not on file    Transportation needs - non-medical: Not on file   Occupational History    Not on file   Tobacco Use    Smoking status: Former Smoker       Types: Cigars       Last attempt to quit:        Years since quittin.7    Smokeless tobacco: Former User    Tobacco comment: 1 per weekend   Substance and Sexual Activity    Alcohol use: Yes       Alcohol/week: 7.2 oz       Types: 12 Cans of beer per week       Comment: weekend    Drug use: No    Sexual activity: Yes       Partners: Female   Other Topics Concern    Patient feels they ought to cut down on drinking/drug use Not Asked    Patient annoyed by others criticizing their drinking/drug use Not Asked    Patient has felt bad or guilty about drinking/drug use Not  Asked    Patient has had a drink/used drugs as an eye opener in the AM Not Asked   Social History Narrative      Frankie Hoyt lives with his wife and 2 younger children in [place], [place].  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  His spouse is Kym.   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 Frankie Hoyt's hobbies include working out                  Family History   Problem Relation Age of Onset    Cancer Father           colon and prostate cancer    Heart disease Father           Review of Systems   Constitutional: Negative for fatigue, fever and unexpected weight change.   HENT: Negative for ear discharge, facial swelling, hearing loss, mouth sores, rhinorrhea, sore throat, tinnitus, trouble swallowing and voice change.    Eyes: Negative for pain and visual disturbance.   Respiratory: Negative for cough and shortness of breath.    Cardiovascular: Negative for chest pain and palpitations.   Gastrointestinal: Negative for abdominal pain, constipation and diarrhea.   Genitourinary: Negative for difficulty urinating and dysuria.   Musculoskeletal: Positive for arthralgias. Negative for back pain and neck pain.   Skin: Negative for color change and rash.   Neurological: Negative for dizziness, seizures and headaches.   Hematological: Positive for adenopathy. Does not bruise/bleed easily.   Psychiatric/Behavioral: Negative for agitation. The patient is not nervous/anxious.        Objective:     Physical Exam   Constitutional: He is oriented to person, place, and time. He appears well-developed and well-nourished. He is cooperative.   HENT:   Head: Normocephalic and atraumatic.   Right Ear: Hearing, tympanic membrane, external ear and ear canal normal.   Left Ear: Hearing, tympanic membrane, external ear and ear canal normal.   Nose: Nose normal. No rhinorrhea, nasal deformity or septal deviation. Right  sinus exhibits no maxillary sinus tenderness and no frontal sinus tenderness. Left sinus exhibits no maxillary sinus tenderness and no frontal sinus tenderness.   Mouth/Throat: Uvula is midline, oropharynx is clear and moist and mucous membranes are normal. He does not have dentures. No oral lesions. No trismus in the jaw. No oropharyngeal exudate or posterior oropharyngeal edema.       .   Eyes: Conjunctivae and EOM are normal. Pupils are equal, round, and reactive to light. Right eye exhibits no chemosis. Left eye exhibits no chemosis.   Neck: Trachea normal, normal range of motion and phonation normal. Neck supple. No JVD present. No tracheal tenderness present. No tracheal deviation and no edema present. No thyroid mass and no thyromegaly present.       Salivary glands - there are no lesions, and there is no asymmetry of the parotid and submandibular glands   Cardiovascular: Normal rate, regular rhythm and intact distal pulses.   Pulmonary/Chest: Effort normal. No accessory muscle usage or stridor. No tachypnea. No respiratory distress.   Abdominal: Normal appearance. He exhibits no distension. There is no guarding.   Lymphadenopathy:     He has cervical adenopathy.        Right cervical: No deep cervical and no posterior cervical adenopathy present.       Left cervical: Deep cervical adenopathy present. No posterior cervical adenopathy present.        Right: No supraclavicular adenopathy present.        Left: No supraclavicular adenopathy present.   Left level II   Neurological: He is alert and oriented to person, place, and time. No cranial nerve deficit. Gait normal.   Skin: Skin is warm and dry. No lesion noted.   Psychiatric: He has a normal mood and affect. His speech is normal.   Vitals reviewed.          FNA 9/18/2018  Supplemental Diagnosis  Cellblock confirms squamous carcinoma with necrosis     CT Neck 9/14/2018:       FINDINGS:  There is a mass within the left neck at the level of the angle of the  mandible which likely represents an enlarged lymph node or lymph node conglomerate, measuring 3.9 x 2.4 x 5.2 cm.  Mass demonstrates irregular margins in keeping with extracapsular extension.  There is encasement of the external carotid artery and internal jugular vein with less than 180 degree abutment of the internal and distal common carotid artery.  There is non visualization of the left internal jugular vein, probably occluded.    There is prominence of the left palatine tonsil as well as fullness of the left vallecula.    Salivary glands are unremarkable.  Thyroid gland is unremarkable.    Lung apices are clear.    Relatively hypodense appearance of the superior T1 vertebral body probably artifactual.

## 2018-10-03 NOTE — ASSESSMENT & PLAN NOTE
Impression         Left neck mass likely representing enlarged lymph node or lymph node conglomerate with extracapsular extension and vascular encasement.  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  Prominence of the left palatine tonsil and fullness of the left vallecula.  Recommend correlation with visual inspection as well as PET-CT.    Relatively hypodense appearance of the superior T1 vertebral body, probably artifactual.      PET-CT 9/19/2018:  Impression         Hypermetabolic left cervical mass and palatine tonsils.  Low level uptake within a borderline sized right cervical node.      Assessment & Plan:           Problem List Items Addressed This Visit      Malignant tumor of palatine tonsil - Primary       Frankie Hoyt is a 48 y.o. 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 Bilateral tonsil cancers, while possible, are mathematically unlikely, and it is my suspicion that the right-sided uptake represents chronic inflammation rather than malignancy. I have reviewed his scans with him and his wife, and there is obliteration of the left IJV, along with apparent radiographic FRANSISCO involving the SCM. Further, the location of this neck mass is in the region of the spinal accessory nerve in level II. For these reasons, especially the FRANSISCO, it is doubtful that a surgery first strategy will actually save him any treatment, as he would probably need chemo-RT, and likely a full course, in the adjuvant setting. Given the potential, and likely, surgical morbidity from spinal accessory dysfunction and the loss of the SCM, plus the probable difficulty in obtaining wide clearance in the neck, I do not recommend TORS with neck dissection. I believe that his tumor is more amenable to chemo-RT (we discussed how the survival rates of both approaches are the same, based on available research).      A multidisciplinary evaluation has been arranged  with other members of the Head and Neck Team.      Consultations are requested for evaluation and treatment of the patient's head and neck cancer with the following services:              1.  Radiation Oncology to discuss potential primary versus adjuvant therapy              2.  Medical Oncology to discuss concurrent therapy in either setting              3.  Psychosocial Oncology (Dr. Beckham) to assess distress and coping              3.  Speech and Language Pathology to evaluate speech and swallowing function and provide exercises to promote healthy swallowing during and after treatment              4.  Nutrition to assess the patient's current and future nutritional requirements              5.  We also recommended dentistry to determine if any dental work is required prior to radiotherapy (if indicated) to minimize the chance of longterm complications.  He will contact his dentist.     After these consultations have been completed, the patient's case will be discussed at the Multidisciplinary Head and Neck Planning Conference.  A formal recommendation for treatment will be made at that time.  Time was allowed for questions, and all questions were answered to the apparent satisfaction of the patient and family.   Proceed with right tonsillectomy and DLB.

## 2018-10-03 NOTE — PLAN OF CARE
Patient is warm and comfortable. Pain is controlled;denies PONV. Vital signs are stable and within normal limit.

## 2018-10-03 NOTE — TELEPHONE ENCOUNTER
"S/w Kristina, charge nurse in the surgery department, this is an add on case, could go at any time, she recommends he remains NPO today. Informed patient of the above, abruptly disconnected.    Reason for Disposition   [1] Caller requesting NON-URGENT health information AND [2] PCP's office is the best resource    Answer Assessment - Initial Assessment Questions  1. REASON FOR CALL or QUESTION: "What is your reason for calling today?" or "How can I best help you?" or "What question do you have that I can help answer?"      Has tonsillectomy today, supposed to  Be late today, he is wondering if he can have anything to drink today?  He was told nothing to eat after midnight, but he is wondering specifically if he can drink today?    Protocols used: ST INFORMATION ONLY CALL-A-AH      "

## 2018-10-03 NOTE — BRIEF OP NOTE
[facility]  Brief Operative Note     SUMMARY     Surgery Date: 10/3/2018     Surgeon(s) and Role:     * Serge Mccray MD - Primary     * Dixie Galindo MD - Resident - Assisting    Pre-op Diagnosis:  Malignant tumor of palatine tonsil [C09.9]    Post-op Diagnosis:  Post-Op Diagnosis Codes:     * Malignant tumor of palatine tonsil [C09.9]    Procedure(s) (LRB):  TONSILLECTOMY (Right)  LARYNGOSCOPY (N/A)    Anesthesia: General    Description of the findings of the procedure: firm left tonsil, normal right tonsil     Findings/Key Components: right tonsillectomy, exam under anesthesia of oral cavity and oropharynx     Estimated Blood Loss: <20 ml         Specimens:   Specimen (12h ago, onward)    None          Discharge Note    SUMMARY     Admit Date: 10/3/2018    Discharge Date and Time:  10/03/2018 6:07 PM    Hospital Course (synopsis of major diagnoses, care, treatment, and services provided during the course of the hospital stay): Following completion of an electively scheduled procedure, the patient was transferred to the PACU for postoperative monitoring.His  hospital course was uneventful and noted for adequate pain control and PO intake following surgery. He   is discharged home in good condition and will follow-up with Dr. Mccray     Final Diagnosis: Post-Op Diagnosis Codes:     * Malignant tumor of palatine tonsil [C09.9]    Disposition: Home or Self Care    Follow Up/Patient Instructions:     Medications:  Reconciled Home Medications:      Medication List      CHANGE how you take these medications    * ondansetron 8 MG Tbdl  Commonly known as:  ZOFRAN-ODT  Take 1 tablet (8 mg total) by mouth every 6 (six) hours as needed.  What changed:  Another medication with the same name was added. Make sure you understand how and when to take each.     * ondansetron 8 MG Tbdl  Commonly known as:  ZOFRAN-ODT  Take 1 tablet (8 mg total) by mouth every 6 (six) hours as needed (nausea).  What  changed:  You were already taking a medication with the same name, and this prescription was added. Make sure you understand how and when to take each.     * oxyCODONE-acetaminophen 7.5-325 mg per tablet  Commonly known as:  PERCOCET  Take 1 tablet by mouth every 6 (six) hours as needed for Pain.  What changed:  Another medication with the same name was added. Make sure you understand how and when to take each.     * oxyCODONE-acetaminophen 7.5-325 mg per tablet  Commonly known as:  PERCOCET  Take 1 tablet by mouth every 4 (four) hours as needed for Pain.  What changed:  You were already taking a medication with the same name, and this prescription was added. Make sure you understand how and when to take each.         * This list has 4 medication(s) that are the same as other medications prescribed for you. Read the directions carefully, and ask your doctor or other care provider to review them with you.            CONTINUE taking these medications    dexamethasone 6 MG tablet  Commonly known as:  DECADRON  Take 1 tablet twice daily for 4 days after chemo     OLANZapine 10 MG tablet  Commonly known as:  ZyPREXA  Take for 4 days after chemo, start on the day of chemo     promethazine 6.25 mg/5 mL syrup  Commonly known as:  PHENERGAN  Take 20 mLs (25 mg total) by mouth every 6 (six) hours as needed for Nausea.     sildenafil 100 MG tablet  Commonly known as:  VIAGRA  Take 1 tablet (100 mg total) by mouth daily as needed for Erectile Dysfunction.     sodium chloride 0.65 % nasal spray  Commonly known as:  SALINE NASAL  2 sprays by Nasal route 2 (two) times daily.          Discharge Procedure Orders   Diet Adult Regular     Notify your health care provider if you experience any of the following:  temperature >100.4     Notify your health care provider if you experience any of the following:  persistent nausea and vomiting or diarrhea     Notify your health care provider if you experience any of the following:  severe  uncontrolled pain     Notify your health care provider if you experience any of the following:  difficulty breathing or increased cough     No dressing needed     Activity as tolerated     Follow-up Information     Serge Mccray MD In 2 weeks.    Specialty:  Otolaryngology  Contact information:  Alayna Lopez jefry  [facility] [id number] [phone]

## 2018-10-03 NOTE — ANESTHESIA PREPROCEDURE EVALUATION
10/03/2018  Frankie Hoyt is a 48 y.o., male with palantine tonsil tumor, on chemotherapy who is now scheudled for direct laryngoscopy and tonsillectomy     Anesthesia Evaluation    I have reviewed the Patient Summary Reports.    I have reviewed the Nursing Notes.   I have reviewed the Medications.     Review of Systems  Anesthesia Hx:  Neg history of prior surgery. Denies Family Hx of Anesthesia complications.   Denies Personal Hx of Anesthesia complications.       Physical Exam  General:  Obesity    Airway/Jaw/Neck:  Airway Findings: Mouth Opening: Normal Tongue: Normal  General Airway Assessment: Adult  Mallampati: II  TM Distance: Normal, at least 6 cm  Jaw/Neck Findings:     Neck ROM: Normal ROM  Neck Findings:  Girth Increased, Abnormal Mass  l     Eyes/Ears/Nose:  EYES/EARS/NOSE FINDINGS: Normal   Dental:  Dental Findings: In tact        Mental Status:  Mental Status Findings:  Cooperative, Alert and Oriented         Anesthesia Plan  Type of Anesthesia, risks & benefits discussed:  Anesthesia Type:  general  Patient's Preference:   Intra-op Monitoring Plan: standard ASA monitors  Intra-op Monitoring Plan Comments:   Post Op Pain Control Plan:   Post Op Pain Control Plan Comments:   Induction:   IV  Beta Blocker:  Patient is not currently on a Beta-Blocker (No further documentation required).       Informed Consent: Patient understands risks and agrees with Anesthesia plan.  Questions answered. Anesthesia consent signed with patient.  ASA Score: 3     Day of Surgery Review of History & Physical:    H&P update referred to the surgeon.         Ready For Surgery From Anesthesia Perspective.

## 2018-10-04 VITALS
OXYGEN SATURATION: 100 % | WEIGHT: 260 LBS | BODY MASS INDEX: 35.21 KG/M2 | TEMPERATURE: 98 F | HEIGHT: 72 IN | SYSTOLIC BLOOD PRESSURE: 150 MMHG | HEART RATE: 62 BPM | DIASTOLIC BLOOD PRESSURE: 60 MMHG | RESPIRATION RATE: 18 BRPM

## 2018-10-04 NOTE — DISCHARGE INSTRUCTIONS
Adult Tonsillectomy  The tonsils are 2 small masses of tissue at the back of the throat. They are part of the bodys immune system. This helps the body fight disease. In some people, the tonsils become infected or enlarged. This can cause severe sore throats, snoring, or other problems. Tonsillectomy is surgery to remove the tonsils. Tonsillectomy may be recommended if you have obstruction causing sleep apnea, or recurring, chronic, or severe infections. This sheet tells you more about this surgery and what to expect.    Preparing for surgery  Prepare as you have been told. Tell your healthcare provider about all medicine you take. This includes over-the-counter drugs. It also includes herbs and other supplements. You may need to stop taking some or all of them before surgery as directed by your healthcare provider. Also, follow any directions youre given for not eating or drinking before surgery.  The day of surgery  The surgery takes about 60 minutes. You will likely go home on the same day.  Before the surgery  Here is what to expect before the surgery begins:  · An IV line is put into a vein in your arm or hand. This line supplies fluids and medicines.  · To keep you free of pain during the surgery, youre given general anesthesia. This medicine puts you into a state like deep sleep through the surgery.  During the surgery  Here is what to expect during the surgery:  · A special device is used to keep the mouth open.  · Other tools are used to remove the tonsils from the back of the throat. The tissue is taken out through the mouth.  · The device holding the mouth open is then removed.  After the surgery  You will be taken to a recovery room. Healthcare staff will make sure you can drink some liquids. They will also make sure your pain is being managed. When you are ready to leave the hospital, you will need to be driven home by an adult family member or friend.  Recovering at home  It will likely take about  2 weeks to heal from the surgery. During your recovery:  · Expect to have throat pain. You may also feel pain in your ears. This is referred pain from the throat, and is normal. Your post-surgery pain may come and go. It may be worse on the 4th or 5th day after surgery.  · Talk as little as possible, if it is painful.  · Take pain medicine as directed.  · Do not drive while you are on opioid or narcotic pain medicine. Expect to feel sleepy or dizzy while you are taking this medicine.  · Do not use ibuprofen or aspirin for 14 days after surgery unless your healthcare provider says its OK. You may use acetaminophen as directed.  · Use 2 or 3 pillows under your head while resting. This will help keep swelling down.  · Drink lots of chilled liquids. Water, noncitrus juices, and frozen juice bars are good choices.  · Eat cold foods and soft foods, which are easiest to swallow. Try foods like ice cream, gelatin, scrambled eggs, pasta, and mashed potatoes.  · Avoid foods that require a lot of chewing. Also avoid foods that may scratch the throat, like toast or potato chips. Avoid hot, spicy, or acidic foods.  · Avoid strenuous activity for 2 to 3 weeks after surgery.  · Be aware that white patches will form in the throat during healing. These are scabs and are not a sign of infection. The patches will come off in 1 to 2 weeks and may cause bleeding. To minimize bleeding, drink lots of fluids. Gargling with cold water can help.  Call the healthcare provider  Call your healthcare provider if you have any of the following:  · Chest pain or trouble breathing (call 911)  · Fever of 100.4°F (38°C) or higher, or as directed by your healthcare provider  · Bright red bleeding from the mouth or nose  · Severe pain not relieved by medicine  · Signs of dehydration (dark urine, urinating less often)  · Heavy or persistent bleeding in the throat at any time  · Other signs or symptoms as indicated by your healthcare provider    Follow-up  Schedule a follow-up visit with your healthcare provider as advised. During this visit, the healthcare provider will make sure you are healing well. Ask any questions you have about the surgery or your recovery.  Risks and possible complications   Risks of this surgery include:  · Infection  · Bleeding  · Injury to the lips or teeth  · Painful swallowing during recovery  · The need for a second surgery  · Risks of anesthesia        Soft Diet  Your healthcare provider has prescribed a soft diet (also called gastrointestinal soft diet). This means eating foods that are soft, low in fiber, and easy to digest. This diet is for people with digestive problems. A soft diet provides foods that are easy to chew and swallow. Foods should be bite-size and very soft or moist. Follow your healthcare providers specific instructions about what foods and drinks you may have. The general guidelines below can help you get started on this diet.       Beverages  OK: Milk, tea, coffee, fruit juices, carbonated beverages, nutrition shakes, and drinks (Note: Thin liquids may be hard to swallow. They may need to be thickened.)  Avoid: None, unless they need to be thickened  Breads and crackers  OK: Refined white, wheat, or seedless rye bread; carlos or soda crackers that have been moistened; plain rolls or bagels; very soft tortillas  Avoid: Whole-grain breads, rolls, or bagels with nuts, raisins, or seeds; crackers, croutons, taco shells  Cereals and grains  OK: Cooked cereals, plain dry cereals that have been moistened, plain macaroni, spaghetti, noodles, rice  Avoid: Whole-grain cereals and granola, or cereals containing bran, raisins, seeds or nuts; coconut; brown or wild rice  Desserts and sweets  OK: Moist cake; soft fruit pie with bottom crust only; soft cookies moistened in milk or other liquid; gelatin, custard, pudding, plain ice cream, plain sherbet, sugar, honey, clear jelly  Avoid: Pastries, desserts, and ice  cream that have nuts, coconut, seeds, or dried fruit; popcorn; chips of any kind including potato chips and tortilla chips; jam, marmalade  Eggs and cheese  OK: Poached, soft boiled, or scrambled eggs; cottage cheese, ricotta cheese, cream cheese, cheese sauces, or cheese melted in other dishes  Avoid: Crisp fried eggs, cheese slices and cubes  Fruits  OK: Avocado, banana, baked peeled apple, applesauce, peeled ripe peaches or pears, canned fruit (apricots, cherries, peaches, pears), melons  Avoid: Raw apple, dried fruits, coconut, pineapple, grapes, fruit kit, fruit snacks  Meat and fish  OK: All fresh meat, poultry, or fish that is cooked until tender  Avoid: Meat, fish, or poultry that is fried; tough or stringy meat including agosto, sausage, bratwurst, jerky, corned beef  Other protein foods  OK: Tofu, baked beans, smooth peanut butter or other nut or seed butters  Avoid: Deep-fried tofu; crunchy peanut or other nut or seed butters; nuts or seeds that are whole or chopped  Soups  OK: All soups, but they may need to be thickened. Thin liquid may be too hard to swallow.  Avoid: Soups made with stringy meat pieces or chunky vegetables  Vegetables  OK: Peeled and well-cooked potatoes or sweet potatoes; fresh, cooked, canned, or frozen vegetables without seeds, skin, or coarse fiber  Avoid: Raw vegetables, deep-fried vegetables (such as tempura), and corn    PATIENT INSTRUCTIONS  POST-ANESTHESIA    IMMEDIATELY FOLLOWING SURGERY:  Do not drive or operate machinery for the first twenty four hours after surgery.  Do not make any important decisions for twenty four hours after surgery or while taking narcotic pain medications or sedatives.  If you develop intractable nausea and vomiting or a severe headache please notify your doctor immediately.    FOLLOW-UP:  Please make an appointment with your surgeon as instructed. You do not need to follow up with anesthesia unless specifically instructed to do so.    WOUND  CARE INSTRUCTIONS (if applicable):  Keep a dry clean dressing on the anesthesia/puncture wound site if there is drainage.  Once the wound has quit draining you may leave it open to air.  Generally you should leave the bandage intact for twenty four hours unless there is drainage.  If the epidural site drains for more than 36-48 hours please call the anesthesia department.    QUESTIONS?:  Please feel free to call your physician or the hospital  if you have any questions, and they will be happy to assist you.       [facility] Anesthesia Department  [address] [facility] [address] [facility]  [phone]

## 2018-10-04 NOTE — PLAN OF CARE
PT is AAOx4. VSS. NAD. IV discontinued. Pain tolerable. Denies nausea. Discharge instructions given, verbalized understanding. Tolerates fluids. Discharged home with family.

## 2018-10-05 NOTE — ANESTHESIA POSTPROCEDURE EVALUATION
Anesthesia Post Evaluation    Patient: Frankie Hoyt    Procedure(s) Performed: Procedure(s) (LRB):  TONSILLECTOMY (Right)  LARYNGOSCOPY (N/A)    Final Anesthesia Type: general  Patient location during evaluation: PACU  Patient participation: Yes- Able to Participate  Level of consciousness: awake and alert and oriented  Pain management: adequate  Airway patency: patent  PONV status at discharge: No PONV  Anesthetic complications: no      Cardiovascular status: blood pressure returned to baseline and hemodynamically stable  Respiratory status: unassisted  Hydration status: euvolemic  Follow-up not needed.        Visit Vitals  BP (!) 150/60   Pulse 62   Temp 36.7 °C (98 °F) (Temporal)   Resp 18   Ht 6' (1.829 m)   Wt 117.9 kg (260 lb)   SpO2 100%   BMI 35.26 kg/m²       Pain/Tom Score: No Data Recorded

## 2018-10-08 ENCOUNTER — RESEARCH ENCOUNTER (OUTPATIENT)
Dept: RESEARCH | Facility: HOSPITAL | Age: 48
End: 2018-10-08

## 2018-10-08 ENCOUNTER — HOSPITAL ENCOUNTER (OUTPATIENT)
Dept: RADIATION THERAPY | Facility: HOSPITAL | Age: 48
Discharge: HOME | End: 2018-10-08
Attending: RADIOLOGY
Payer: COMMERCIAL

## 2018-10-08 PROCEDURE — 77290 THER RAD SIMULAJ FIELD CPLX: CPT | Mod: 26,,, | Performed by: RADIOLOGY

## 2018-10-08 PROCEDURE — 77334 RADIATION TREATMENT AID(S): CPT | Mod: TC | Performed by: RADIOLOGY

## 2018-10-08 PROCEDURE — 77263 THER RADIOLOGY TX PLNG CPLX: CPT | Mod: ,,, | Performed by: RADIOLOGY

## 2018-10-08 PROCEDURE — 77014 HC CT GUIDANCE RADIATION THERAPY FLDS PLACEMENT: CPT | Mod: TC | Performed by: RADIOLOGY

## 2018-10-08 PROCEDURE — 77290 THER RAD SIMULAJ FIELD CPLX: CPT | Mod: TC | Performed by: RADIOLOGY

## 2018-10-08 PROCEDURE — 77334 RADIATION TREATMENT AID(S): CPT | Mod: 26,,, | Performed by: RADIOLOGY

## 2018-10-08 NOTE — PROGRESS NOTES
Oct 08, 2017     Protocol: Phase 2, multi-center, randomized, double-blind, placebo controlled study to assess the safety and efficacy of topically applied  for the attenuation of oral mucositis in subjects with cancers of the head and neck receiving concomitant chemoradion therapy.    Protocol # -DMVW-[id number]  IRB # 2017.177.B  PI: Karlos Ontiveros MD  Version Date: 26-May-2018      Informed Consent Process:     I met with the patient to discuss the above mentioned protocol. He is alert and oriented x 3. Mood and affect appropriate to the situation.    Purpose of the study reviewed with the patient.  He verbalizes understanding of this information.  Pt was informed that the Medication Being Studied is a rinse called  that contains genetically modified bacteria which will produce additional hTFF1 which is thought to help his mouth become more resistant to mucositis.    Length of study and number of participants reviewed with the patient; he verbalized that he understands that his participation in the trial will consist of four phases; the screening phase, the active drug phase, short term follow up, and long term follow up.  He also verbalized understanding that [facility] will be enrolling about 25 subjects.    Pt informed of the Study Description. He understands that he has a 50% chance of receiving the study drug at randomization.   Study procedures discussed in detail with the patient: Including Use of Study Rinse and Procedures.  Patient verbalized understanding of this information.  Pt given the option to consent for Optional sampling for Pharmacokinetic analysis, which he has also consented to.  All study associated Risks and/or discomforts discussed in detail with the patient including: side effects including the possible risk of bacteremia. Patient verbalizes general understanding of this information. He agrees to report immediately any new or unusual symptoms that he may get.   Benefits, costs and/or  payment reimbursement and source of funding all reviewed with the patient. Patient states he understands that his insurance will cover cost of all standard of care medications and procedures,the study will provide the study drug and payment for procedures not considered standard of care. The patient verbalized understanding of this information.   Alternative treatment methods discussed with patient and his wife; he states understanding of this information.  He also informed that in the event that he should pass away during the follow up period of the study, the study doctor will contact his family for permission for an autopsy to be done.    Study related questions/compensation for injury, and whom to contact regarding rights as a research subject all reviewed with the patient; he verbalizes understanding of this information.   Voluntary participation and withdrawal from research stressed to patient; he states he understands that he may withdraw consent at any time without compromise to his care.   Confidentiality and HIPAA discussed with patient; process of protection and security of database explained to patient; he verbalizes understanding of this information. Informed the patient that detailed information on this trial can be found at www.clinicaltrials.gov.    The optional blood sampling for genetic research consent was discussed as well. Reviewed what this entails, the risks, potential benefits, the costs associated with the optional research, why the patient is being asked to participate, that the patient will not be paid to participate, what the specimens may be used for, and why, that his samples will be kept confidential, and that he can enroll in the main study and refuse this optional section.  Discussed that the participation in the optional sampling for genetic research is voluntary and that he can withdrawal his consent at any time. Confidentiality and HIPAA authorization was also discussed in detail  including what information may be shared with the study, what groups may have access to that information, and why.     The patient signed consent prior to any study related procedures were done.       Throughout the consenting process, the patient was given several opportunities to ask questions; all questions addressed and answered to patient by myself . Patient states his willingness to participate in the study; patient signed and dated the consent form; copy of the consent form given to patient along with my contact information. Informed the patient that I would contact him with his next scheduled appointments once scheduled. Patient verbalized understanding. Instructed patient to notify myself and/or Dr. Ontiveros for any questions and/or concerns. Patient verbalized understanding.

## 2018-10-08 NOTE — OP NOTE
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 male who presented with a left neck mass and was ultimately determined to have a left tonsil primary SCC that was p16+. During his workup, he was found to have PET avidity in the right tonsil, which clouded his clinical picture. His left neck ivan metastases exhibited radiographic evidence of extranodal extension, and, given the location of the ivan disease in the region of the proximal spinal accessory nerve, the tumor board determined that he would not likely be a candidate for deintensification of radiation and chemotherapy even if he underwent definitive surgery. Plus, normal shoulder function would have been unlikely, at least in the short term because of the morbidity of the neck dissection. However, the PET avidity in the right tonsil needed to be clarified so that his radiation fields could be focused on the left tonsil and lateral pharynx - otherwise, the entire pharynx would have been treated. In order to provide evidence that the right side could be excluded (or included, depending on your perspective), we recommended a right tonsillectomy.    The risks, benefits, indications, and alternatives to this procedure were thoroughly discussed with the patient preoperatively, and informed consent was obtained. Please see my detailed preoperative notes for a thorough account of this discussion.    Findings: Clinically normal right tonsil. Left tonsil mass with soft palate involvement measuring 4x3cm.  2 cm margins around the known left tonsil primary would have resulted in a complete resection of the left soft palate. He was a difficult exposure of the oropharynx because of his relative macroglossia and limited jaw excursion.     EBL: 5mL    IVF:  May be found in the operative record    Detailed Account of Technique Employed:    The patient was brought into the operating room and placed supine on the operating table. The patient was intubated transorally by the anesthesiology service, and an adequate level of general endotracheal anesthesia was obtained. The patient's face was prepped and draped in the standard, sterile fashion. A timeout was performed according to the universal protocol.     The Estiven-Damian mouth gag was inserted into the oral cavity and opened to expose the oropharynx. The left tonsil was examined and palpated, with measurements taken as above. A zero degree telescope was employed to demonstrate the ulceronodular left tonsil cancer. Attention was then turned to the right tonsil, which was similarly assessed. It was grasped with a tonsil tenaculum and retracted medially. The Bovie was then used to incise the anterior pillar and soft palate, and the tonsil capsule was identified. A capsular dissection was performed from superior to inferior and from anterior to posterior. The final inferior attachments to the lateral pharyngeal wall were severed, and the tonsil was passed off the table for pathologic analysis. Hemostasis was accomplished with the bipolar and afrin soaked tonsil balls. The right tonsil was clinically normal, and no findings suggesting occult malignancy were detected intraoperatively.      The patient was allowed to awaken from anesthesia, extubated, and taken to the PACU in stable condition.     All sponge, needle, and instrument counts were correct at the end of the procedure x 2.     I was present for and performed all portions of the operation as noted above.

## 2018-10-09 ENCOUNTER — TELEPHONE (OUTPATIENT)
Dept: OTOLARYNGOLOGY | Facility: CLINIC | Age: 48
End: 2018-10-09

## 2018-10-09 DIAGNOSIS — Z00.6 EXAMINATION OF PARTICIPANT IN CLINICAL TRIAL: ICD-10-CM

## 2018-10-09 DIAGNOSIS — C09.9 MALIGNANT TUMOR OF PALATINE TONSIL: Primary | ICD-10-CM

## 2018-10-09 DIAGNOSIS — C09.9 TONSIL CANCER: Primary | ICD-10-CM

## 2018-10-09 RX ORDER — OXYCODONE AND ACETAMINOPHEN 7.5; 325 MG/1; MG/1
1 TABLET ORAL EVERY 4 HOURS PRN
Qty: 30 TABLET | Refills: 0 | Status: SHIPPED | OUTPATIENT
Start: 2018-10-09 | End: 2018-11-05 | Stop reason: SDUPTHER

## 2018-10-09 NOTE — TELEPHONE ENCOUNTER
----- Message from Terrence Suero sent at 10/9/2018  8:48 AM CDT -----  Contact: [phone]  Needs Advice    Reason for call: Pt requesting pain medication refill, as well as a work excuse as he has returned to work as of this morning. Pt also needing a confirmation of his surgery date and his upcoming surgery and treatment for his cancer. Please contact to discuss        Communication Preference: [phone]    Additional Information:

## 2018-10-10 ENCOUNTER — TELEPHONE (OUTPATIENT)
Dept: OTOLARYNGOLOGY | Facility: CLINIC | Age: 48
End: 2018-10-10

## 2018-10-10 NOTE — PHYSICIAN QUERY
PT Name: Frankie Hoyt  MR #: [id number]    Physician Query Form - Pathology Findings Clarification     CDS/: Mariely Beltre               Contact information: [email or web address]  This form is a permanent document in the medical record.     Query Date: October 10, 2018      By submitting this query, we are merely seeking further clarification of documentation.  Please utilize your independent clinical judgment when addressing the question(s) below.      The medical record contains the following:     Findings Supporting Clinical Information Location in Medical Record   FINAL PATHOLOGIC DIAGNOSIS  Right tonsil:  Reactive lymphoid hyperplasia.  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          [  ] I agree with the Pathology Findings        [  ] I do not agree with the Pathology Findings        [  ] Clinically Insignificant        [  ] Clinically Undetermined        [ X ] Other/Clarification of Findings: _______This tonsil is benign. He has a proven cancer of the LEFT tonsil._______________________________________    Please document in your progress notes daily for the duration of treatment until resolved and include in your discharge summary.

## 2018-10-10 NOTE — TELEPHONE ENCOUNTER
----- Message from Serge Mccray MD sent at 10/9/2018  6:39 PM CDT -----  Contact: [phone]  Yes  I will talk with him then,  Can you tell him that the right tonsil was clear? I want to see him and make sure he signs up for the monitoring program.  B  ----- Message -----  From: Sulma Ferreira RN  Sent: 10/9/2018   9:20 AM  To: Serge Mccray MD    Requesting pathology results.    Also, does he need a post-op appt this Thursday with Francesca?     Thanks,  Aline    ----- Message -----  From: Terrence Suero  Sent: 10/9/2018   8:48 AM  To: Mahendra LAMBERT Staff    Needs Advice    Reason for call: Pt requesting pain medication refill, as well as a work excuse as he has returned to work as of this morning. Pt also needing a confirmation of his surgery date and his upcoming surgery and treatment for his cancer. Please contact to discuss        Communication Preference: [phone]    Additional Information:

## 2018-10-11 ENCOUNTER — OFFICE VISIT (OUTPATIENT)
Dept: OTOLARYNGOLOGY | Facility: CLINIC | Age: 48
End: 2018-10-11
Payer: COMMERCIAL

## 2018-10-11 ENCOUNTER — CLINICAL SUPPORT (OUTPATIENT)
Dept: SPEECH THERAPY | Facility: HOSPITAL | Age: 48
End: 2018-10-11
Payer: COMMERCIAL

## 2018-10-11 ENCOUNTER — TELEPHONE (OUTPATIENT)
Dept: OTOLARYNGOLOGY | Facility: CLINIC | Age: 48
End: 2018-10-11

## 2018-10-11 VITALS
SYSTOLIC BLOOD PRESSURE: 135 MMHG | BODY MASS INDEX: 34.47 KG/M2 | HEART RATE: 71 BPM | DIASTOLIC BLOOD PRESSURE: 83 MMHG | WEIGHT: 254.19 LBS

## 2018-10-11 DIAGNOSIS — R13.12 DYSPHAGIA, OROPHARYNGEAL: Primary | ICD-10-CM

## 2018-10-11 DIAGNOSIS — C09.9 TONSIL CANCER: ICD-10-CM

## 2018-10-11 DIAGNOSIS — C09.9 MALIGNANT TUMOR OF PALATINE TONSIL: Primary | ICD-10-CM

## 2018-10-11 PROCEDURE — G8997 SWALLOW GOAL STATUS: HCPCS | Mod: CK | Performed by: SPEECH-LANGUAGE PATHOLOGIST

## 2018-10-11 PROCEDURE — 99999 PR PBB SHADOW E&M-EST. PATIENT-LVL III: CPT | Mod: PBBFAC,,, | Performed by: NURSE PRACTITIONER

## 2018-10-11 PROCEDURE — G8998 SWALLOW D/C STATUS: HCPCS | Mod: CJ | Performed by: SPEECH-LANGUAGE PATHOLOGIST

## 2018-10-11 PROCEDURE — 99024 POSTOP FOLLOW-UP VISIT: CPT | Mod: S$GLB,,, | Performed by: NURSE PRACTITIONER

## 2018-10-11 PROCEDURE — G8996 SWALLOW CURRENT STATUS: HCPCS | Mod: CJ | Performed by: SPEECH-LANGUAGE PATHOLOGIST

## 2018-10-11 PROCEDURE — 92610 EVALUATE SWALLOWING FUNCTION: CPT | Mod: GN | Performed by: SPEECH-LANGUAGE PATHOLOGIST

## 2018-10-11 NOTE — TELEPHONE ENCOUNTER
----- Message from Darby Mendoza NP sent at 10/11/2018 10:52 AM CDT -----  Can you let him know Dr. Ontiveros is planning to start radiation early next week, they are still putting the plan together. The patient left before I heard back from Dr. Ontiveros. Someone should be calling from Rama's office. Chemo will start once with radiation.    Thanks!

## 2018-10-11 NOTE — ASSESSMENT & PLAN NOTE
Dr. Mccray was present during this visit to discuss path report and treatment plan with the patient. Pt to start chemoradiation next week. SLP referral and audiogram order placed. Questions answered. RTC 1 month after completing treatment.

## 2018-10-11 NOTE — PROGRESS NOTES
Subjective:       Patient ID: Frankie Hoyt is a 48 y.o. male.    Chief Complaint: Post-op Evaluation       Malignant tumor of palatine tonsil    9/18/2018 Biopsy     FNA =   Supplemental Diagnosis  Cellblock confirms squamous carcinoma with necrosis  Immunostain for p16 is positive. The controls stain appropriately         9/19/2018 Initial Diagnosis     Malignant tumor of palatine tonsil         9/20/2018 Tumor Conference     His case was discussed at the Multidisciplinary Head and Neck Team Planning Meeting.    Representatives from Medical Oncology, Radiation Oncology, Head and Neck Surgical Oncology, Psychosocial Oncology, and Speech and Language Pathology discussed the case with the following recommendations:    1) diagnostic right tonsillectomy  2) chemoradiation                 9/22/2018 Tumor Markers     Patient's tumor was tested for the following markers: p16.                                              Results of the tumor marker test revealed positive         10/3/2018 Surgery     Right tonsillectomy         10/4/2018 Tumor Conference     His case was discussed at the Multidisciplinary Head and Neck Team Planning Meeting.    Representatives from Medical Oncology, Radiation Oncology, Head and Neck Surgical Oncology, Psychosocial Oncology, and Speech and Language Pathology discussed the case with the following recommendations:    1) Chemoradiation                    HPI     Frankie Hoyt returns for a post op visit. He has been doing well since his surgery. His pain is well controlled. No complaints.    Past Medical History:   Diagnosis Date    Abdominal pain, LLQ (left lower quadrant) 12/2012    Anxiety     Cancer of tonsil     left tonsil    Diverticular disease 2012       Past Surgical History:   Procedure Laterality Date    COLONOSCOPY  2014    colonscopy  2012    LARYNGOSCOPY N/A 10/3/2018    Procedure: LARYNGOSCOPY;  Surgeon: Serge Mccray MD;  Location: [facility] OR [address] [place] [address];  Service: ENT;   Laterality: N/A;    LARYNGOSCOPY N/A 10/3/2018    Performed by Serge Mccray MD at [facility] [address] [place] [address]    tonsil biopsy 2018      TONSILLECTOMY Right 10/3/2018    Procedure: TONSILLECTOMY;  Surgeon: Serge Mccray MD;  Location: [facility] [address] [place] [address];  Service: ENT;  Laterality: Right;    TONSILLECTOMY Right 10/3/2018    Performed by Serge Mccray MD at [facility] [address] [place] [address]         Current Outpatient Medications:     dexamethasone (DECADRON) 6 MG tablet, Take 1 tablet twice daily for 4 days after chemo, Disp: 24 tablet, Rfl: 0    OLANZapine (ZYPREXA) 10 MG tablet, Take for 4 days after chemo, start on the day of chemo, Disp: 15 tablet, Rfl: 2    ondansetron (ZOFRAN-ODT) 8 MG TbDL, Take 1 tablet (8 mg total) by mouth every 6 (six) hours as needed., Disp: 60 tablet, Rfl: 4    ondansetron (ZOFRAN-ODT) 8 MG TbDL, Take 1 tablet (8 mg total) by mouth every 6 (six) hours as needed (nausea)., Disp: 10 tablet, Rfl: 0    oxyCODONE-acetaminophen (PERCOCET) 7.5-325 mg per tablet, Take 1 tablet by mouth every 4 (four) hours as needed for Pain., Disp: 30 tablet, Rfl: 0    promethazine (PHENERGAN) 6.25 mg/5 mL syrup, Take 20 mLs (25 mg total) by mouth every 6 (six) hours as needed for Nausea., Disp: 473 mL, Rfl: 6    sildenafil (VIAGRA) 100 MG tablet, Take 1 tablet (100 mg total) by mouth daily as needed for Erectile Dysfunction., Disp: 6 tablet, Rfl: 1    sodium chloride (SALINE NASAL) 0.65 % nasal spray, 2 sprays by Nasal route 2 (two) times daily., Disp: , Rfl: 12    Review of patient's allergies indicates:  No Known Allergies    Social History     Socioeconomic History    Marital status:      Spouse name: Not on file    Number of children: Not on file    Years of education: Not on file    Highest education level: Not on file   Social Needs    Financial resource strain: Not on file    Food insecurity - worry: Not on file    Food insecurity - inability: Not on file    Transportation needs - medical: Not on file     Transportation needs - non-medical: Not on file   Occupational History    Not on file   Tobacco Use    Smoking status: Former Smoker     Types: Cigars     Last attempt to quit:      Years since quittin.7    Smokeless tobacco: Former User    Tobacco comment: 1 per weekend   Substance and Sexual Activity    Alcohol use: Yes     Alcohol/week: 7.2 oz     Types: 12 Cans of beer per week     Comment: weekend    Drug use: No    Sexual activity: Yes     Partners: Female   Other Topics Concern    Patient feels they ought to cut down on drinking/drug use Not Asked    Patient annoyed by others criticizing their drinking/drug use Not Asked    Patient has felt bad or guilty about drinking/drug use Not Asked    Patient has had a drink/used drugs as an eye opener in the AM Not Asked   Social History Narrative     Frankie Hoyt lives with his wife and 2 younger children in [place], [place].  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  His spouse is Kym.   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 Frankie Hoyt's hobbies include working out       Family History   Problem Relation Age of Onset    Cancer Father         colon and prostate cancer    Heart disease Father            Review of Systems   Constitutional: Negative for appetite change, chills, diaphoresis, fatigue, fever and unexpected weight change.   HENT: Positive for sore throat. Negative for congestion, dental problem, drooling, ear discharge, ear pain, facial swelling, hearing loss, mouth sores, nosebleeds, postnasal drip, rhinorrhea, sinus pressure, sneezing, tinnitus, trouble swallowing and voice change.    Eyes: Negative for pain, discharge, redness and itching.   Respiratory: Negative for cough and shortness of breath.    Cardiovascular: Negative for chest pain.   Gastrointestinal: Negative for abdominal distention, abdominal pain,  diarrhea, nausea and vomiting.   Endocrine: Negative for cold intolerance and heat intolerance.   Genitourinary: Negative for difficulty urinating.   Musculoskeletal: Negative for neck pain and neck stiffness.   Skin: Negative for rash.   Neurological: Negative for dizziness, weakness and headaches.   Hematological: Positive for adenopathy.       Objective:      Physical Exam   Constitutional: He appears well-developed and well-nourished.   HENT:   Head: Normocephalic and atraumatic.   Pulmonary/Chest: Effort normal. No respiratory distress.         Exam deferred, visit devoted to counseling.    Assessment:       1. Malignant tumor of palatine tonsil        Plan:       Problem List Items Addressed This Visit        Oncology    Malignant tumor of palatine tonsil - Primary     Dr. Mccray was present during this visit to discuss path report and treatment plan with the patient. Pt to start chemoradiation next week. SLP referral and audiogram order placed. Questions answered. RTC 1 month after completing treatment.         Relevant Orders    Ambulatory referral to [facility]    Head & Neck Cancer Digital Medicine Enrollment (Completed)    Assign Recurring Symptoms & Swallowing Questionnaires    COMPREHENSIVE AUDIOGRAM

## 2018-10-12 ENCOUNTER — TELEPHONE (OUTPATIENT)
Dept: HEMATOLOGY/ONCOLOGY | Facility: CLINIC | Age: 48
End: 2018-10-12

## 2018-10-12 ENCOUNTER — TELEPHONE (OUTPATIENT)
Dept: RADIATION ONCOLOGY | Facility: CLINIC | Age: 48
End: 2018-10-12

## 2018-10-12 DIAGNOSIS — C09.9 TONSIL CANCER: Primary | ICD-10-CM

## 2018-10-12 PROCEDURE — 77301 RADIOTHERAPY DOSE PLAN IMRT: CPT | Mod: TC | Performed by: RADIOLOGY

## 2018-10-12 PROCEDURE — 77301 RADIOTHERAPY DOSE PLAN IMRT: CPT | Mod: 26,,, | Performed by: RADIOLOGY

## 2018-10-12 NOTE — TELEPHONE ENCOUNTER
Spoke with pt about his radiation. Informed pt we would do radiation films on 10/15 @ 1045am and Abraham would discuss a time on 10/16 that we would do radiation around chemo times. Pt verbalized understanding.

## 2018-10-15 PROCEDURE — 77280 THER RAD SIMULAJ FIELD SMPL: CPT | Mod: 26,,, | Performed by: RADIOLOGY

## 2018-10-15 PROCEDURE — 77470 SPECIAL RADIATION TREATMENT: CPT | Mod: 59,TC | Performed by: RADIOLOGY

## 2018-10-15 PROCEDURE — 77280 THER RAD SIMULAJ FIELD SMPL: CPT | Mod: TC | Performed by: RADIOLOGY

## 2018-10-15 PROCEDURE — 77470 SPECIAL RADIATION TREATMENT: CPT | Mod: 26,59,, | Performed by: RADIOLOGY

## 2018-10-15 PROCEDURE — 77300 RADIATION THERAPY DOSE PLAN: CPT | Mod: 26,,, | Performed by: RADIOLOGY

## 2018-10-15 PROCEDURE — 77300 RADIATION THERAPY DOSE PLAN: CPT | Mod: TC | Performed by: RADIOLOGY

## 2018-10-15 PROCEDURE — 77338 DESIGN MLC DEVICE FOR IMRT: CPT | Mod: 26,,, | Performed by: RADIOLOGY

## 2018-10-15 PROCEDURE — 77338 DESIGN MLC DEVICE FOR IMRT: CPT | Mod: TC | Performed by: RADIOLOGY

## 2018-10-16 ENCOUNTER — LAB VISIT (OUTPATIENT)
Dept: LAB | Facility: HOSPITAL | Age: 48
End: 2018-10-16
Payer: COMMERCIAL

## 2018-10-16 ENCOUNTER — RESEARCH ENCOUNTER (OUTPATIENT)
Dept: RESEARCH | Facility: HOSPITAL | Age: 48
End: 2018-10-16

## 2018-10-16 ENCOUNTER — OFFICE VISIT (OUTPATIENT)
Dept: HEMATOLOGY/ONCOLOGY | Facility: CLINIC | Age: 48
End: 2018-10-16
Payer: COMMERCIAL

## 2018-10-16 ENCOUNTER — INFUSION (OUTPATIENT)
Dept: INFUSION THERAPY | Facility: HOSPITAL | Age: 48
End: 2018-10-16
Attending: INTERNAL MEDICINE
Payer: COMMERCIAL

## 2018-10-16 VITALS
SYSTOLIC BLOOD PRESSURE: 149 MMHG | HEART RATE: 77 BPM | HEIGHT: 72 IN | TEMPERATURE: 99 F | DIASTOLIC BLOOD PRESSURE: 79 MMHG | BODY MASS INDEX: 35 KG/M2 | RESPIRATION RATE: 18 BRPM | WEIGHT: 258.38 LBS | OXYGEN SATURATION: 98 %

## 2018-10-16 VITALS
SYSTOLIC BLOOD PRESSURE: 140 MMHG | TEMPERATURE: 99 F | DIASTOLIC BLOOD PRESSURE: 79 MMHG | HEART RATE: 72 BPM | RESPIRATION RATE: 18 BRPM

## 2018-10-16 DIAGNOSIS — C09.9 TONSIL CANCER: Primary | ICD-10-CM

## 2018-10-16 DIAGNOSIS — C09.9 MALIGNANT TUMOR OF PALATINE TONSIL: Primary | ICD-10-CM

## 2018-10-16 DIAGNOSIS — C09.9 TONSIL CANCER: ICD-10-CM

## 2018-10-16 DIAGNOSIS — Z00.6 EXAMINATION OF PARTICIPANT IN CLINICAL TRIAL: ICD-10-CM

## 2018-10-16 DIAGNOSIS — C77.0 SECONDARY MALIGNANCY OF CERVICOFACIAL LYMPH NODE: ICD-10-CM

## 2018-10-16 DIAGNOSIS — C09.9 MALIGNANT TUMOR OF PALATINE TONSIL: ICD-10-CM

## 2018-10-16 LAB
ALBUMIN SERPL BCP-MCNC: 3.4 G/DL
ALP SERPL-CCNC: 65 U/L
ALT SERPL W/O P-5'-P-CCNC: 25 U/L
ANION GAP SERPL CALC-SCNC: 7 MMOL/L
AST SERPL-CCNC: 22 U/L
BILIRUB SERPL-MCNC: 0.4 MG/DL
BUN SERPL-MCNC: 17 MG/DL
CALCIUM SERPL-MCNC: 9.5 MG/DL
CHLORIDE SERPL-SCNC: 109 MMOL/L
CO2 SERPL-SCNC: 27 MMOL/L
CREAT SERPL-MCNC: 1.1 MG/DL
ERYTHROCYTE [DISTWIDTH] IN BLOOD BY AUTOMATED COUNT: 12.2 %
EST. GFR  (AFRICAN AMERICAN): >60 ML/MIN/1.73 M^2
EST. GFR  (NON AFRICAN AMERICAN): >60 ML/MIN/1.73 M^2
GLUCOSE SERPL-MCNC: 100 MG/DL
HCT VFR BLD AUTO: 43.7 %
HGB BLD-MCNC: 14.3 G/DL
IMM GRANULOCYTES # BLD AUTO: 0.09 K/UL
LDH SERPL L TO P-CCNC: 134 U/L
MCH RBC QN AUTO: 29.5 PG
MCHC RBC AUTO-ENTMCNC: 32.7 G/DL
MCV RBC AUTO: 90 FL
NEUTROPHILS # BLD AUTO: 3.9 K/UL
PHOSPHATE SERPL-MCNC: 3.3 MG/DL
PLATELET # BLD AUTO: 337 K/UL
PMV BLD AUTO: 9.1 FL
POTASSIUM SERPL-SCNC: 4.2 MMOL/L
PROT SERPL-MCNC: 7.1 G/DL
RBC # BLD AUTO: 4.84 M/UL
SODIUM SERPL-SCNC: 143 MMOL/L
WBC # BLD AUTO: 7.53 K/UL

## 2018-10-16 PROCEDURE — 25000003 PHARM REV CODE 250: Performed by: INTERNAL MEDICINE

## 2018-10-16 PROCEDURE — 96367 TX/PROPH/DG ADDL SEQ IV INF: CPT

## 2018-10-16 PROCEDURE — 96366 THER/PROPH/DIAG IV INF ADDON: CPT

## 2018-10-16 PROCEDURE — 77386 HC IMRT, COMPLEX: CPT | Performed by: RADIOLOGY

## 2018-10-16 PROCEDURE — 96413 CHEMO IV INFUSION 1 HR: CPT

## 2018-10-16 PROCEDURE — G6002 STEREOSCOPIC X-RAY GUIDANCE: HCPCS | Mod: 26,,, | Performed by: RADIOLOGY

## 2018-10-16 PROCEDURE — 36415 COLL VENOUS BLD VENIPUNCTURE: CPT

## 2018-10-16 PROCEDURE — C9463 INJECTION, APREPITANT: HCPCS | Mod: TB | Performed by: INTERNAL MEDICINE

## 2018-10-16 PROCEDURE — 99215 OFFICE O/P EST HI 40 MIN: CPT | Mod: S$GLB,,, | Performed by: INTERNAL MEDICINE

## 2018-10-16 PROCEDURE — 63600175 PHARM REV CODE 636 W HCPCS: Mod: TB | Performed by: INTERNAL MEDICINE

## 2018-10-16 PROCEDURE — 84100 ASSAY OF PHOSPHORUS: CPT

## 2018-10-16 PROCEDURE — 83615 LACTATE (LD) (LDH) ENZYME: CPT

## 2018-10-16 PROCEDURE — 99999 PR PBB SHADOW E&M-EST. PATIENT-LVL IV: CPT | Mod: PBBFAC,,, | Performed by: INTERNAL MEDICINE

## 2018-10-16 PROCEDURE — 3008F BODY MASS INDEX DOCD: CPT | Mod: CPTII,S$GLB,, | Performed by: INTERNAL MEDICINE

## 2018-10-16 PROCEDURE — 80053 COMPREHEN METABOLIC PANEL: CPT

## 2018-10-16 PROCEDURE — 85027 COMPLETE CBC AUTOMATED: CPT

## 2018-10-16 RX ORDER — HEPARIN 100 UNIT/ML
500 SYRINGE INTRAVENOUS
Status: DISCONTINUED | OUTPATIENT
Start: 2018-10-16 | End: 2018-10-16 | Stop reason: HOSPADM

## 2018-10-16 RX ORDER — SODIUM CHLORIDE 9 MG/ML
INJECTION, SOLUTION INTRAVENOUS CONTINUOUS
Status: CANCELLED | OUTPATIENT
Start: 2018-10-17

## 2018-10-16 RX ORDER — HEPARIN 100 UNIT/ML
500 SYRINGE INTRAVENOUS
Status: CANCELLED | OUTPATIENT
Start: 2018-10-17

## 2018-10-16 RX ORDER — SODIUM CHLORIDE 0.9 % (FLUSH) 0.9 %
10 SYRINGE (ML) INJECTION
Status: CANCELLED | OUTPATIENT
Start: 2018-10-17

## 2018-10-16 RX ORDER — SODIUM CHLORIDE 0.9 % (FLUSH) 0.9 %
10 SYRINGE (ML) INJECTION
Status: CANCELLED | OUTPATIENT
Start: 2018-10-16

## 2018-10-16 RX ORDER — HEPARIN 100 UNIT/ML
500 SYRINGE INTRAVENOUS
Status: CANCELLED | OUTPATIENT
Start: 2018-10-16

## 2018-10-16 RX ORDER — SODIUM CHLORIDE 0.9 % (FLUSH) 0.9 %
10 SYRINGE (ML) INJECTION
Status: DISCONTINUED | OUTPATIENT
Start: 2018-10-16 | End: 2018-10-16 | Stop reason: HOSPADM

## 2018-10-16 RX ADMIN — MAGNESIUM SULFATE: 500 INJECTION, SOLUTION INTRAMUSCULAR; INTRAVENOUS at 11:10

## 2018-10-16 RX ADMIN — DEXAMETHASONE SODIUM PHOSPHATE: 4 INJECTION, SOLUTION INTRA-ARTICULAR; INTRALESIONAL; INTRAMUSCULAR; INTRAVENOUS; SOFT TISSUE at 02:10

## 2018-10-16 RX ADMIN — MAGNESIUM SULFATE: 500 INJECTION, SOLUTION INTRAMUSCULAR; INTRAVENOUS at 03:10

## 2018-10-16 RX ADMIN — APREPITANT 130 MG: 130 INJECTION, EMULSION INTRAVENOUS at 01:10

## 2018-10-16 RX ADMIN — CISPLATIN 245 MG: 100 INJECTION, SOLUTION INTRAVENOUS at 02:10

## 2018-10-16 NOTE — Clinical Note
Schedule weekly CBC,CMp, mag and phos in between chemo treatments and schedule CBC,CMP, Mag and phos and see me in 3 weeks and for Q 3 week of Cisplatin. IV fluids on day 2

## 2018-10-16 NOTE — PLAN OF CARE
Problem: Patient Care Overview  Goal: Plan of Care Review  Outcome: Ongoing (interventions implemented as appropriate)  Pt tolerated Cisplatin treatment well with no s/s of reaction.  VSS. NAD.  Will return tomorrow for day 2 fluids.  Pt voiced understanding.  Education completed.

## 2018-10-16 NOTE — PROGRESS NOTES
Oct 16, 2018     Protocol: Phase 2, multi-center, randomized, double-blind, placebo controlled study to assess the safety and efficacy of topically applied  for the attenuation of oral mucositis in subjects with cancers of the head and neck receiving concomitant chemoradion therapy.    Protocol # -[id number]  IRB # 2017.177.B  PI: Karlos Ontiveros MD  Version Date: 08-May-2018       Week 1 Visit 1:      Patient presented to the clinic today for the above mentioned treatment.  Patient alert and oriented x 3. Mood and affect are appropriate to the situation. Patient denies any fever, pain or shortness of breath. Denies any vomiting, diarrhea or constipation at this time. Pt denies any mouth sores.   Denies any fatigue. Reviewed and confirmed which concomitant medications with the patient he is currently taking. See concomitant therapy worksheet. Patient examined by Jonathan.  No clinically significant findings noted on exam.  Lab work resulted and reviewed by Dr. Castillo.  Lab work out of range = not clinically significant per Dr. Castillo. The patient verbalized understanding of this information and are in agreement of this treatment plan.   Pt will start treatment on above mentioned protocol today.  All side effects discussed with patient who voices understanding.  OM assessment done no mucositis noted on exam, but did have some redness in his throat.   Patient took his first dose of mouth rinse for above mentioned study in front of me today.  No problems noted.  Pt given instructions on oral care and caries kit along with medication given to patient.  Pt instructed to bring back used bottles of medication once weekly to exchange for new doses of medication.   Daily diary explained to patient who voices understanding.      Kit numbers [id number] and [id number] dispensed to pt.      For most up to date Adverse events see log in research shadow paper chart.     Review of Adverse Events:        Baseline Medical History:  Erectile  dysfunction. Grade 2.  Pt takes Viagra  Anxiety.  Grade 1   Diverticulitis.  History of .   Grade 1   Left neck pain.  Grade 1  Throat pain.  Grade 1  Right tonsillectomy

## 2018-10-16 NOTE — PROGRESS NOTES
Subjective:       Patient ID: Frankie Hoyt is a 48 y.o. male.    Chief Complaint: Malignant tumor of palatine tonsil  ONCOLOGIC HISTORY: Mr. Frankie Hoyt is a 48-year-old smoker and tobacco chewer who had waxing and waning neck mass for the last 18 months.  He underwent a CT scan in February 2017 that was read as negative.  He noticed that his left cheek swelling really got worse in July 2018.  He was seen in Urgent Care and received antibiotics without any improvement.  Then he saw Dr. Franks and underwent FNA of the left neck, which was positive for malignant cells.  She also noted left tonsillar asymmetry.  CT neck at this time showed a mass in the left neck at the level of the angle of the mandible, which represented an   enlarged lymph node measuring 3.9 x 2.4 x 5.2 cm, mass demonstrated irregular margins in keeping with extracapsular extension.  There was encasement of the external carotid artery and internal jugular vein with less than 180-degree abutment of the internal and distal common carotid artery.  There was prominence of the left palatine tonsil as well as fullness of the left vallecula.  He was seen by Dr. Mccray and underwent a PET scan, which showed a hypermetabolic left cervical mass and bilateral palatine tonsils.  There was a low level uptake in the borderline right-sided cervical lymph node and no evidence of distant   Metastasis    HPIHe comes in to start chemo/RT with Cisplatin. He denies any new issues.  He underwent right tonsillectomy which was negative for malignancy      Review of Systems   Constitutional: Negative for appetite change, fatigue and unexpected weight change.   HENT: Negative for mouth sores.    Eyes: Negative for visual disturbance.   Respiratory: Negative for cough and shortness of breath.    Cardiovascular: Negative for chest pain.   Gastrointestinal: Negative for abdominal pain and diarrhea.   Genitourinary: Negative for frequency.   Musculoskeletal: Negative for back  pain.   Skin: Negative for rash.   Neurological: Negative for headaches.   Hematological: Negative for adenopathy.   Psychiatric/Behavioral: The patient is not nervous/anxious.    All other systems reviewed and are negative.      Objective:      Physical Exam   Constitutional: He is oriented to person, place, and time. He appears well-developed and well-nourished.   HENT:   Mouth/Throat: No oropharyngeal exudate.   Neck:       Cardiovascular: Normal rate and normal heart sounds.   Pulmonary/Chest: Effort normal and breath sounds normal. He has no wheezes.   Abdominal: Soft. Bowel sounds are normal. There is no tenderness.   Musculoskeletal: He exhibits no edema or tenderness.   Lymphadenopathy:     He has no cervical adenopathy.   Neurological: He is alert and oriented to person, place, and time. Coordination normal.   Skin: Skin is warm and dry. No rash noted.   Psychiatric: He has a normal mood and affect. Judgment and thought content normal.   Vitals reviewed.        LABS:  WBC   Date Value Ref Range Status   10/16/2018 7.53 3.90 - 12.70 K/uL Final     Hemoglobin   Date Value Ref Range Status   10/16/2018 14.3 14.0 - 18.0 g/dL Final     Hematocrit   Date Value Ref Range Status   10/16/2018 43.7 40.0 - 54.0 % Final     Platelets   Date Value Ref Range Status   10/16/2018 337 150 - 350 K/uL Final     Gran # (ANC)   Date Value Ref Range Status   10/16/2018 3.9 1.8 - 7.7 K/uL Final     Comment:     The ANC is based on a white cell differential from an   automated cell counter. It has not been microscopically   reviewed for the presence of abnormal cells. Clinical   correlation is required.         Chemistry        Component Value Date/Time     10/16/2018 0846    K 4.2 10/16/2018 0846     10/16/2018 0846    CO2 27 10/16/2018 0846    BUN 17 10/16/2018 0846    CREATININE 1.1 10/16/2018 0846     10/16/2018 0846        Component Value Date/Time    CALCIUM 9.5 10/16/2018 0846    ALKPHOS 65 10/16/2018  0846    AST 22 10/16/2018 0846    ALT 25 10/16/2018 0846    BILITOT 0.4 10/16/2018 0846    ESTGFRAFRICA >60.0 10/16/2018 0846    EGFRNONAA >60.0 10/16/2018 0846          Assessment:       1. Malignant tumor of palatine tonsil    2. Secondary malignancy of cervicofacial lymph node        Plan:        1,2. He will proceed with Cisplatin and concurrent RT and will return in 3 weeks for next cycle. Labs weekly. Discussed aggressive oral hydration as well. He is signed up on the mucositis trial  Patient was consented for chemotherapy today 10/16/2018 .   An extensive discussion was had which included a thorough discussion of the risk and benefits of treatment and alternatives.  Risks, including but not limited to, possible hair loss, bone marrow damage (anemia, thrombocytopenia, immune suppression, neutropenia), damage to body organs (brain, heart, liver, kidney, lungs, nervous system, skin, and others), allergic reactions, sterility, nausea/vomiting, constipation/diarrhea, sores in the mouth, secondary cancers, local damage at possible injection sites, and rarely death were all discussed.  The patient agrees with the plan, and all questions have been answered to their satisfaction.  Consent was signed the patient, provider, and a third party witness.      Above care plan was discussed with patient and all questions were addressed to his satisfaction

## 2018-10-17 ENCOUNTER — DOCUMENTATION ONLY (OUTPATIENT)
Dept: RADIATION ONCOLOGY | Facility: CLINIC | Age: 48
End: 2018-10-17

## 2018-10-17 ENCOUNTER — INFUSION (OUTPATIENT)
Dept: INFUSION THERAPY | Facility: HOSPITAL | Age: 48
End: 2018-10-17
Attending: INTERNAL MEDICINE
Payer: COMMERCIAL

## 2018-10-17 VITALS
SYSTOLIC BLOOD PRESSURE: 134 MMHG | TEMPERATURE: 98 F | RESPIRATION RATE: 18 BRPM | DIASTOLIC BLOOD PRESSURE: 66 MMHG | HEART RATE: 63 BPM

## 2018-10-17 DIAGNOSIS — C09.9 MALIGNANT TUMOR OF PALATINE TONSIL: Primary | ICD-10-CM

## 2018-10-17 PROCEDURE — G6002 STEREOSCOPIC X-RAY GUIDANCE: HCPCS | Mod: 26,,, | Performed by: RADIOLOGY

## 2018-10-17 PROCEDURE — 96360 HYDRATION IV INFUSION INIT: CPT

## 2018-10-17 PROCEDURE — 25000003 PHARM REV CODE 250: Performed by: INTERNAL MEDICINE

## 2018-10-17 PROCEDURE — 77386 HC IMRT, COMPLEX: CPT | Performed by: RADIOLOGY

## 2018-10-17 RX ORDER — HEPARIN 100 UNIT/ML
500 SYRINGE INTRAVENOUS
Status: DISCONTINUED | OUTPATIENT
Start: 2018-10-17 | End: 2018-10-17 | Stop reason: HOSPADM

## 2018-10-17 RX ORDER — SODIUM CHLORIDE 0.9 % (FLUSH) 0.9 %
10 SYRINGE (ML) INJECTION
Status: DISCONTINUED | OUTPATIENT
Start: 2018-10-17 | End: 2018-10-17 | Stop reason: HOSPADM

## 2018-10-17 RX ORDER — SODIUM CHLORIDE 9 MG/ML
INJECTION, SOLUTION INTRAVENOUS CONTINUOUS
Status: DISCONTINUED | OUTPATIENT
Start: 2018-10-17 | End: 2018-10-17 | Stop reason: HOSPADM

## 2018-10-17 RX ADMIN — SODIUM CHLORIDE: 0.9 INJECTION, SOLUTION INTRAVENOUS at 02:10

## 2018-10-17 NOTE — NURSING
"1427  OK per MD Satti to give 1L NS on D2 over 1 hour, pt reports no heart or kidney compromise (pt does report hx of "extra heart beat" on EKG; discussed all with pt and spouse who verbalized understanding  "

## 2018-10-17 NOTE — PLAN OF CARE
Problem: Patient Care Overview  Goal: Plan of Care Review  Outcome: Ongoing (interventions implemented as appropriate)  Day 2 of XRT to left tonsil. Chemo yesterday. Fatigued today but no nausea. Swallowing ok.

## 2018-10-17 NOTE — PLAN OF CARE
Problem: Chemotherapy Effects (Adult)  Goal: Signs and Symptoms of Listed Potential Problems Will be Absent, Minimized or Managed (Chemotherapy Effects)  Signs and symptoms of listed potential problems will be absent, minimized or managed by discharge/transition of care (reference Chemotherapy Effects (Adult) CPG).  Outcome: Ongoing (interventions implemented as appropriate)  Pt here for IVF, accompanied by spouse, reports feeling tired, no other complaints or concerns at present; discussed treatment plan for today, all questions answered and pt agrees to proceed

## 2018-10-17 NOTE — PLAN OF CARE
Problem: Patient Care Overview  Goal: Plan of Care Review  Outcome: Ongoing (interventions implemented as appropriate)  Infusion completed, pt tolerated well; pt instructed to increase hydration x 3 days r/t Cisplatin infusion, to remain well hydrated, to contact MD for any needs or concerns; declined printed AVS, verbalized understanding of all discussed and when to report next

## 2018-10-18 ENCOUNTER — PATIENT MESSAGE (OUTPATIENT)
Dept: HEMATOLOGY/ONCOLOGY | Facility: CLINIC | Age: 48
End: 2018-10-18

## 2018-10-18 ENCOUNTER — CLINICAL SUPPORT (OUTPATIENT)
Dept: AUDIOLOGY | Facility: CLINIC | Age: 48
End: 2018-10-18
Payer: COMMERCIAL

## 2018-10-18 ENCOUNTER — RESEARCH ENCOUNTER (OUTPATIENT)
Dept: RESEARCH | Facility: HOSPITAL | Age: 48
End: 2018-10-18

## 2018-10-18 DIAGNOSIS — H90.3 SENSORINEURAL HEARING LOSS, BILATERAL: Primary | ICD-10-CM

## 2018-10-18 PROCEDURE — 77386 HC IMRT, COMPLEX: CPT | Performed by: RADIOLOGY

## 2018-10-18 PROCEDURE — 99999 PR PBB SHADOW E&M-EST. PATIENT-LVL I: CPT | Mod: PBBFAC,,,

## 2018-10-18 PROCEDURE — 92567 TYMPANOMETRY: CPT | Mod: S$GLB,,, | Performed by: AUDIOLOGIST

## 2018-10-18 PROCEDURE — 92557 COMPREHENSIVE HEARING TEST: CPT | Mod: S$GLB,,, | Performed by: AUDIOLOGIST

## 2018-10-18 PROCEDURE — G6002 STEREOSCOPIC X-RAY GUIDANCE: HCPCS | Mod: 26,,, | Performed by: RADIOLOGY

## 2018-10-18 NOTE — PROGRESS NOTES
"Josiah Frankie Hoyt (Tim) was seen in the clinic today for an audiological evaluation.   Mr. Hoyt reported he began chemotherapy this week and needed a baseline audiogram.    Audiological testing revealed a mild to severe sensorineural hearing loss, bilaterally.  A speech reception threshold was obtained at 20 dBHL for the right ear and at 25 dBHL for the left ear.  Speech discrimination was 96% for the right ear and 88% for the left ear.      Tympanometry testing revealed Type A tympanograms, bilaterally.    Recommendations:  1. Otologic evaluation  2. Follow-up audiological evaluation, as needed  3. Hearing protection when in noise   4. Hearing aid consultation following medical clearance              "

## 2018-10-18 NOTE — LETTER
October 18, 2018    Frankie Hoyt  [address] [facility]  [place] [address]             [facility]  [address]  [place] [address]  Phone: [phone] To Whom It May Concern:    Frankie Hoyt may return to normal work duties the day after chemo, as tolerated.         Sincerely,          Dee Dee Ni RN

## 2018-10-19 PROCEDURE — 77386 HC IMRT, COMPLEX: CPT | Performed by: RADIOLOGY

## 2018-10-19 PROCEDURE — G6002 STEREOSCOPIC X-RAY GUIDANCE: HCPCS | Mod: 26,,, | Performed by: RADIOLOGY

## 2018-10-19 PROCEDURE — 77336 RADIATION PHYSICS CONSULT: CPT | Performed by: RADIOLOGY

## 2018-10-19 NOTE — PROGRESS NOTES
REFERRING PHYSICIAN:  Serge Mccray M.D., Head and Neck Surgical Oncologist  LENGTH OF VISIT:  1 hour    REASON FOR REFERRAL:  Frankie Hoyt, age 48, was referred by Dr. 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  He was unaccompanied.    Mr. Hoyt had a R tonsillectomy 10/3/18 as a diagnostic procedure due to abnormal imaging findings in the R tonsil.  The L tonsil was know to exhibit malignancy.    MEDICAL HISTORY:  Past Medical History:   Diagnosis Date    Abdominal pain, LLQ (left lower quadrant) 12/2012    Anxiety     Cancer of tonsil     left tonsil    Diverticular disease 2012       SURGICAL HISTORY:  Past Surgical History:   Procedure Laterality Date    COLONOSCOPY  2014    colonscopy  2012    LARYNGOSCOPY N/A 10/3/2018    Procedure: LARYNGOSCOPY;  Surgeon: Serge Mccray MD;  Location: [facility] OR [address] [place] [address];  Service: ENT;  Laterality: N/A;    LARYNGOSCOPY N/A 10/3/2018    Performed by Serge Mccray MD at [facility] OR [address] [place] [address]    tonsil biopsy 2018      TONSILLECTOMY Right 10/3/2018    Procedure: TONSILLECTOMY;  Surgeon: Serge Mccray MD;  Location: [facility] OR [address] [place] [address];  Service: ENT;  Laterality: Right;    TONSILLECTOMY Right 10/3/2018    Performed by Serge Mccray MD at [facility] OR [address] [place] [address]       ONCOLOGIC and TREATMENT HISTORY of problem for which patient is referred:     Malignant tumor of palatine tonsil    9/18/2018 Biopsy     FNA =   Supplemental Diagnosis  Cellblock confirms squamous carcinoma with necrosis  Immunostain for p16 is positive. The controls stain appropriately         9/19/2018 Initial Diagnosis     Malignant tumor of palatine tonsil         9/20/2018 Tumor Conference     His case was discussed at the Multidisciplinary Head and Neck Team Planning Meeting.    Representatives from Medical Oncology,  Radiation Oncology, Head and Neck Surgical Oncology, Psychosocial Oncology, and Speech and Language Pathology discussed the case with the following recommendations:    1) diagnostic right tonsillectomy  2) chemoradiation                 9/22/2018 Tumor Markers     Patient's tumor was tested for the following markers: p16.                                              Results of the tumor marker test revealed positive         10/3/2018 Surgery     Right tonsillectomy         10/4/2018 Tumor Conference     His case was discussed at the Multidisciplinary Head and Neck Team Planning Meeting.    Representatives from Medical Oncology, Radiation Oncology, Head and Neck Surgical Oncology, Psychosocial Oncology, and Speech and Language Pathology discussed the case with the following recommendations:    1) Chemoradiation                    SWALLOWING HISTORY:  Taking a regular consistency diet with thin liquids prior to tonsillectomy; currently on soft diet.  Denied pre- or post-op dysphagia.  R lateral tongue is sore s/p surgery.    FAMILY HISTORY:  Family History   Problem Relation Age of Onset    Cancer Father         colon and prostate cancer    Heart disease Father      SOCIAL HISTORY:  Mr. Hoyt is  and lives with his wife and two children in [place].  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  He enjoys fishing ([employer]).     Tobacco use:  Former smokeless tobacco; quit dipping a couple of months ago.  Former smoker of occasional cigars.  ETOH use:  Yes, about a 12-pack of beer per weekend and sometimes a nice bourbon.    BEHAVIOR:  Mr. Hoyt was a pleasant man.  His affect and social interaction were appropriate for situation and setting.  He was fully cooperative for the assessment. Results are considered indicative of his current levels of speech and swallowing functioning.    HEARING:  Subjectively, within normal limits in the quiet setting of the office, but  "Mr. Hoyt described it as, "Isn't the best."  He noted that low pitches were harder to hear and, per his description, he is having some discrimination issues in background noise. He did not recall when his hearing was last assessed.    ASSESSMENT:  Oral Peripheral:     Mandible: 45 mm via TheraBite measuring tool; avg = 40 mm.  Reported it was tender.   Lips:  Within normal limits  for rounding, spreading, and alternating as well as impounding air, smacking, and repetition of /p/.   Tongue:  Within normal limits for protrusion, lateralization, elevation, retroflexion.  Did note mild tremor-like movements in anterior half on protrusion.  He produces "er" with retroflexion.  AMRs for /t/, /k/, and diadochokinetics were within normal limits.   Velum and Hard Palate:  Within normal limits.   Reflexes:  Gag: Present per patient report Swallow: Present   Dentition:  Bite within normal limits, dentition all native   Oropharynx: R tonsillectomy site healing; otherwise within normal limits.    Speech:  100% intelligible with no distortions.        Swallowing:  Within normal limits for timing of initiation and laryngeal elevation on palpation.       Voice/Resonance:  Within normal limits.       COUNSELING:  Mr. Hoyt engaged in discussion of normal swallowing function, possible effects of CRT, and therapeutic intervention.    Reviewed the basics of the normal pharyngeal swallow using Follow the Swallow.    Possible side effects related to treatment:  Xerostomia:   Provided written resources for OTC and homemade (baking soda rinses) treatments for dry mouth.    Irritation:  Discussed that the treatment itself can cause irritation to the tissues plus certain tastes and textures may be found to be irritating as well.  Described mucositis and advised that should it occur during XRT, the Radiation Oncologist would prescribe appropriate treatment.  Changes in taste: Discussed this possibility and the resource of Nutrition to assist " "in making diet changes that may help address this.  Radiation fibrosis: Described this effect and the possible need for continued use of swallowing exercises for an extended time (including lifelong) after completion of XRT to keep the structures involved in swallowing functioning at their maximum potential for safe swallowing.    Lymphedema:  Described this and its possible emergence following surgery and/or XRT and that it is treatable.      Reviewed and provided written instructions for swallowing strategies and exercises as follows:  Strategies/techniques:  1. Appropriate seating  2. Smaller size of sips and bites  3. Swallowing one sip and bite at a time  4. Rate of eating and drinking  5. Dry swallows  6. Alternate liquid and solid swallows    Exercises:  Reviewed each and its rationale in supporting various aspects of swallowing function.  Frankie Hoyt was instructed to initiate these now and continue to perform them 4 times per day, 10 repetitions throughout his XRT and 3 months beyond:  1. Breath-hold maneuver  2. Mendelsohn maneuver ("kick" modification)  3. Falsetto voice/elevation of larynx  4. Tongue base retraction  5. Tongue-anchor swallow (Nataliia)   6. Shaker exercise   7. Effortful swallow  8. Open widely (yawn)    Discussed the goal of swallowing whatever is possible, as much as possible throughout  treatment for the best possible outcome (which may be relative to a given patient) and using a PEG tube supplementally to be sure he is obtaining optimum nutrition and calories to adequately maintain his strength and stamina.  Discussed that he may need to change the consistency of what he can swallow (e.g., if chewable solids are too hard/painful/irritating, change to pureed foods or liquids) and this is perfectly acceptable within the goal of continuing to swallow throughout the treatment and beyond.  Advised that most patients begin to experience swallowing problems about half-way through XRT.  " Should he begin to exhibit any swallowing problems such as signs/symptoms of aspiration, a Modified Barium Swallow Study may be ordered to assess his swallowing function.  Reiterated that the swallowing exercises are done in an effort to prevent or limit swallowing function problems during or after XRT and that he should continue to do them even if he does not think he is having a problem with swallowing safely.    In process of enrolling in digital medicine program; to be completed by nurse navigator.     IMPRESSIONS:   This 47 yo man appears to present with   1.  Speech and swallowing function within normal limits at this time.  2.  Post-op odynophagia.  3.  Possible hearing loss per patient report.  4.  At-risk dysphagia during and/or after CRT.  5.  T2N1 p16+ SCC L tonsil per Dr. Mccray.       G codes:  Swallowing  Current status:  FCM:  LEVEL 5 (20-39% impaired)   - CJ NOTE:  Related to odynophagia s/p tonsillectomy.  Projected status:  FCM:   LEVEL 4  (40-59% impaired)  - CK  Discharge status:  FCM:    LEVEL 5 (20-39% impaired)   -  CJ       RECOMMENDATIONS/PLAN OF CARE:  It is felt that Mr. Hoyt would benefit from  1.  Continuation of his current soft consistency diet and transitioning back to a regular consistency diet with thin liquids using the above strategies and common aspiration precautions, including, but not limited to monitoring for any signs/symptoms of aspiration (such as wet/gurgly voice that does not clear with coughing, inability to make any voice sounds, any persistent coughing with oral intake, otherwise unexplained fever, unexplained increased or new difficulty or discomfort breathing, unexplained increase in  sleepiness/lethargy/significant fatigue, unexplained increase or new onset confusion or change in cognitive functioning, or any other unexplained change in health or well-being that could be related to swallowing).  2.  Changing diet consistency as needed to facilitate  swallowing during CRT.  3.  Begin swallowing exercises now and continue 4x/day through at least 3 months after completion of treatment.  4.  Consider audiologic evaluation (as there is none on file here) if a potentially ototoxic chemotherapy agent will be used and as he reports acuity and discrimination concerns.  5.  Monitor weight and hydration.  6.  Return to my clinic 1 month after completion of treatment in conjunction with return to Dr. Mccray's clinic, or earlier as needed.

## 2018-10-20 NOTE — PLAN OF CARE
IMPRESSIONS:   This 49 yo man appears to present with   1.  Speech and swallowing function within normal limits at this time.  2.  Post-op odynophagia.  3.  Possible hearing loss per patient report.  4.  At-risk dysphagia during and/or after CRT.  5.  T2N1 p16+ SCC L tonsil per Dr. Mccray.       G codes:  Swallowing  Current status:  FCM:  LEVEL 5 (20-39% impaired)   - CJ NOTE:  Related to odynophagia s/p tonsillectomy.  Projected status:  FCM:   LEVEL 4  (40-59% impaired)  - CK  Discharge status:  FCM:    LEVEL 5 (20-39% impaired)   -  CJ       RECOMMENDATIONS/PLAN OF CARE:  It is felt that Mr. Hoyt would benefit from  1.  Continuation of his current soft consistency diet and transitioning back to a regular consistency diet with thin liquids using the above strategies and common aspiration precautions, including, but not limited to monitoring for any signs/symptoms of aspiration (such as wet/gurgly voice that does not clear with coughing, inability to make any voice sounds, any persistent coughing with oral intake, otherwise unexplained fever, unexplained increased or new difficulty or discomfort breathing, unexplained increase in  sleepiness/lethargy/significant fatigue, unexplained increase or new onset confusion or change in cognitive functioning, or any other unexplained change in health or well-being that could be related to swallowing).  2.  Changing diet consistency as needed to facilitate swallowing during CRT.  3.  Begin swallowing exercises now and continue 4x/day through at least 3 months after completion of treatment.  4.  Consider audiologic evaluation (as there is none on file here) if a potentially ototoxic chemotherapy agent will be used and as he reports acuity and discrimination concerns.  5.  Monitor weight and hydration.  6.  Return to my clinic 1 month after completion of treatment in conjunction with return to Dr. Mccray's clinic, or earlier as needed.

## 2018-10-22 ENCOUNTER — LAB VISIT (OUTPATIENT)
Dept: LAB | Facility: HOSPITAL | Age: 48
End: 2018-10-22
Attending: INTERNAL MEDICINE
Payer: COMMERCIAL

## 2018-10-22 ENCOUNTER — INFUSION (OUTPATIENT)
Dept: INFUSION THERAPY | Facility: HOSPITAL | Age: 48
End: 2018-10-22
Attending: INTERNAL MEDICINE
Payer: COMMERCIAL

## 2018-10-22 ENCOUNTER — TELEPHONE (OUTPATIENT)
Dept: HEMATOLOGY/ONCOLOGY | Facility: CLINIC | Age: 48
End: 2018-10-22

## 2018-10-22 ENCOUNTER — OFFICE VISIT (OUTPATIENT)
Dept: HEMATOLOGY/ONCOLOGY | Facility: CLINIC | Age: 48
End: 2018-10-22
Payer: COMMERCIAL

## 2018-10-22 VITALS
DIASTOLIC BLOOD PRESSURE: 78 MMHG | HEIGHT: 72 IN | BODY MASS INDEX: 35.35 KG/M2 | TEMPERATURE: 98 F | HEART RATE: 72 BPM | WEIGHT: 261 LBS | SYSTOLIC BLOOD PRESSURE: 133 MMHG | RESPIRATION RATE: 18 BRPM

## 2018-10-22 VITALS
WEIGHT: 261.94 LBS | BODY MASS INDEX: 35.48 KG/M2 | TEMPERATURE: 98 F | HEIGHT: 72 IN | DIASTOLIC BLOOD PRESSURE: 92 MMHG | OXYGEN SATURATION: 97 % | HEART RATE: 81 BPM | SYSTOLIC BLOOD PRESSURE: 146 MMHG | RESPIRATION RATE: 17 BRPM

## 2018-10-22 DIAGNOSIS — C09.9 TONSIL CANCER: Primary | ICD-10-CM

## 2018-10-22 DIAGNOSIS — C77.0 SECONDARY MALIGNANCY OF CERVICOFACIAL LYMPH NODE: ICD-10-CM

## 2018-10-22 DIAGNOSIS — C09.9 TONSIL CANCER: ICD-10-CM

## 2018-10-22 DIAGNOSIS — C09.9 MALIGNANT TUMOR OF PALATINE TONSIL: ICD-10-CM

## 2018-10-22 DIAGNOSIS — L08.9 SKIN INFECTION: Primary | ICD-10-CM

## 2018-10-22 PROBLEM — L02.91 ABSCESS: Status: ACTIVE | Noted: 2018-10-22

## 2018-10-22 LAB
ALBUMIN SERPL BCP-MCNC: 3.1 G/DL
ALP SERPL-CCNC: 61 U/L
ALT SERPL W/O P-5'-P-CCNC: 32 U/L
ANION GAP SERPL CALC-SCNC: 7 MMOL/L
AST SERPL-CCNC: 19 U/L
BILIRUB SERPL-MCNC: 0.5 MG/DL
BUN SERPL-MCNC: 19 MG/DL
CALCIUM SERPL-MCNC: 9.3 MG/DL
CHLORIDE SERPL-SCNC: 104 MMOL/L
CO2 SERPL-SCNC: 27 MMOL/L
CREAT SERPL-MCNC: 0.9 MG/DL
ERYTHROCYTE [DISTWIDTH] IN BLOOD BY AUTOMATED COUNT: 12.1 %
EST. GFR  (AFRICAN AMERICAN): >60 ML/MIN/1.73 M^2
EST. GFR  (NON AFRICAN AMERICAN): >60 ML/MIN/1.73 M^2
GLUCOSE SERPL-MCNC: 121 MG/DL
HCT VFR BLD AUTO: 41.9 %
HGB BLD-MCNC: 14.6 G/DL
IMM GRANULOCYTES # BLD AUTO: 0.3 K/UL
MAGNESIUM SERPL-MCNC: 1.8 MG/DL
MCH RBC QN AUTO: 30.2 PG
MCHC RBC AUTO-ENTMCNC: 34.8 G/DL
MCV RBC AUTO: 87 FL
NEUTROPHILS # BLD AUTO: 7.8 K/UL
PHOSPHATE SERPL-MCNC: 3.3 MG/DL
PLATELET # BLD AUTO: 323 K/UL
PMV BLD AUTO: 9.2 FL
POTASSIUM SERPL-SCNC: 3.8 MMOL/L
PROT SERPL-MCNC: 6.7 G/DL
RBC # BLD AUTO: 4.83 M/UL
SODIUM SERPL-SCNC: 138 MMOL/L
WBC # BLD AUTO: 11.52 K/UL

## 2018-10-22 PROCEDURE — G6002 STEREOSCOPIC X-RAY GUIDANCE: HCPCS | Mod: 26,,, | Performed by: RADIOLOGY

## 2018-10-22 PROCEDURE — 84100 ASSAY OF PHOSPHORUS: CPT

## 2018-10-22 PROCEDURE — 99999 PR PBB SHADOW E&M-EST. PATIENT-LVL IV: CPT | Mod: PBBFAC,,, | Performed by: NURSE PRACTITIONER

## 2018-10-22 PROCEDURE — 3008F BODY MASS INDEX DOCD: CPT | Mod: CPTII,S$GLB,, | Performed by: NURSE PRACTITIONER

## 2018-10-22 PROCEDURE — 63600175 PHARM REV CODE 636 W HCPCS: Performed by: NURSE PRACTITIONER

## 2018-10-22 PROCEDURE — 83735 ASSAY OF MAGNESIUM: CPT

## 2018-10-22 PROCEDURE — 96366 THER/PROPH/DIAG IV INF ADDON: CPT

## 2018-10-22 PROCEDURE — 85027 COMPLETE CBC AUTOMATED: CPT

## 2018-10-22 PROCEDURE — 77386 HC IMRT, COMPLEX: CPT | Performed by: RADIOLOGY

## 2018-10-22 PROCEDURE — 80053 COMPREHEN METABOLIC PANEL: CPT

## 2018-10-22 PROCEDURE — 96365 THER/PROPH/DIAG IV INF INIT: CPT

## 2018-10-22 PROCEDURE — 25000003 PHARM REV CODE 250: Performed by: NURSE PRACTITIONER

## 2018-10-22 PROCEDURE — 99214 OFFICE O/P EST MOD 30 MIN: CPT | Mod: S$GLB,,, | Performed by: NURSE PRACTITIONER

## 2018-10-22 PROCEDURE — 36415 COLL VENOUS BLD VENIPUNCTURE: CPT

## 2018-10-22 RX ORDER — SULFAMETHOXAZOLE AND TRIMETHOPRIM 800; 160 MG/1; MG/1
2 TABLET ORAL 2 TIMES DAILY
Qty: 28 TABLET | Refills: 0 | Status: SHIPPED | OUTPATIENT
Start: 2018-10-22 | End: 2018-10-26 | Stop reason: SDUPTHER

## 2018-10-22 RX ORDER — FAMCICLOVIR 500 MG/1
500 TABLET ORAL EVERY 12 HOURS
Qty: 14 TABLET | Refills: 0 | Status: SHIPPED | OUTPATIENT
Start: 2018-10-22 | End: 2018-10-29

## 2018-10-22 RX ADMIN — VANCOMYCIN HYDROCHLORIDE 1000 MG: 1 INJECTION, POWDER, LYOPHILIZED, FOR SOLUTION INTRAVENOUS at 12:10

## 2018-10-22 NOTE — NURSING
1426 message received from charge nurse that LDH drawn and sent to lab at 1220 hemolyzed need to be re collected, pt already d/c to radiation, message sent to naheed moreno

## 2018-10-22 NOTE — TELEPHONE ENCOUNTER
Spoke with patient.  He notes a red pimple on the tip of his nose. Pimple has a head on it. He tried popping it---and now his nose is swollen and infected. He states this happened about 8-9 months ago. No history of acne. Does not have a dermatologist. Denies fever. Patient is calling to ask what he should put on it, as he is embarrassed of the way he looks.   Nurse informed patient she would contact him back after speaking with dr oconnor---as she would recommend him to use warm compress and a triple antibiotic cream for now.  Patient voiced understanding.      Message routed to dr oconnor

## 2018-10-22 NOTE — TELEPHONE ENCOUNTER
See the pics. He needs to be seen in Urgent care with labs and most likely will need Vanc and Bactrim provided hi labs are OK

## 2018-10-22 NOTE — TELEPHONE ENCOUNTER
Spoke with TERRY.  Patient scheduled to come in for labs and appt with TERRY. Patient is on wait list for IV vanc/bactrim this afternoon, granted his labs are OK.    Message routed to dr oconnor/terry

## 2018-10-22 NOTE — PROGRESS NOTES
Subjective:       Patient ID: Frankie Hoyt is a 48 y.o. male.    Chief Complaint: Follow-up; Headache; and spot on nose    ONCOLOGIC HISTORY: Mr. Frankie Hoyt is a 48-year-old smoker and tobacco chewer who had waxing and waning neck mass for the last 18 months.  He underwent a CT scan in February 2017 that was read as negative.  He noticed that his left cheek swelling really got worse in July 2018.  He was seen in Urgent Care and received antibiotics without any improvement.  Then he saw Dr. Franks and underwent FNA of the left neck, which was positive for malignant cells.  She also noted left tonsillar asymmetry.  CT neck at this time showed a mass in the left neck at the level of the angle of the mandible, which represented an   enlarged lymph node measuring 3.9 x 2.4 x 5.2 cm, mass demonstrated irregular margins in keeping with extracapsular extension.  There was encasement of the external carotid artery and internal jugular vein with less than 180-degree abutment of the internal and distal common carotid artery.  There was prominence of the left palatine tonsil as well as fullness of the left vallecula.  He was seen by Dr. Mccray and underwent a PET scan, which showed a hypermetabolic left cervical mass and bilateral palatine tonsils.  There was a low level uptake in the borderline right-sided cervical lymph node and no evidence of distant Metastasis    He underwent right tonsillectomy which was negative for malignancy    Started chemo/RT with Cisplatin (10/16/18).    HPI   He comes in for an urgent care visit. Patient noticed a pimple to his nose yesterday that he tried popping. He expressed a small amount of pus. Today, he noticed that his nose was red and swollen and the area scabbed. Slight tenderness to the area. He reports its similar to the feeling he gets from a fever blister.  No fever/chills. He reports having this before and it resolved on its own. He has a history of fever blisters. Of note, he  tolerated chemo well with minimal side effects.        Review of Systems   Constitutional: Negative for appetite change, fatigue and unexpected weight change.   HENT: Negative for mouth sores.    Eyes: Negative for visual disturbance.   Respiratory: Negative for cough and shortness of breath.    Cardiovascular: Negative for chest pain.   Gastrointestinal: Negative for abdominal pain and diarrhea.   Genitourinary: Negative for frequency.   Musculoskeletal: Negative for back pain.   Skin: Negative for rash.   Neurological: Negative for headaches.   Hematological: Negative for adenopathy.   Psychiatric/Behavioral: The patient is not nervous/anxious.    All other systems reviewed and are negative.      Objective:      Physical Exam   Constitutional: He is oriented to person, place, and time. He appears well-developed and well-nourished.   HENT:   Mouth/Throat: No oropharyngeal exudate.   Erythema, mild tissue swelling and small scabbed area to right side of nose (tip). Nasal passage with minimal swelling at entry way.    Neck:       Cardiovascular: Normal rate and normal heart sounds.   Pulmonary/Chest: Effort normal and breath sounds normal. He has no wheezes.   Abdominal: Soft. Bowel sounds are normal. There is no tenderness.   Musculoskeletal: He exhibits no edema or tenderness.   Lymphadenopathy:     He has no cervical adenopathy.   Neurological: He is alert and oriented to person, place, and time. Coordination normal.   Skin: Skin is warm and dry. No rash noted.   Psychiatric: He has a normal mood and affect. Judgment and thought content normal.   Vitals reviewed.        LABS:  WBC   Date Value Ref Range Status   10/22/2018 11.52 3.90 - 12.70 K/uL Final     Hemoglobin   Date Value Ref Range Status   10/22/2018 14.6 14.0 - 18.0 g/dL Final     Hematocrit   Date Value Ref Range Status   10/22/2018 41.9 40.0 - 54.0 % Final     Platelets   Date Value Ref Range Status   10/22/2018 323 150 - 350 K/uL Final     Gran #  (ANC)   Date Value Ref Range Status   10/22/2018 7.8 (H) 1.8 - 7.7 K/uL Final     Comment:     The ANC is based on a white cell differential from an   automated cell counter. It has not been microscopically   reviewed for the presence of abnormal cells. Clinical   correlation is required.         Chemistry        Component Value Date/Time     10/22/2018 1017    K 3.8 10/22/2018 1017     10/22/2018 1017    CO2 27 10/22/2018 1017    BUN 19 10/22/2018 1017    CREATININE 0.9 10/22/2018 1017     (H) 10/22/2018 1017        Component Value Date/Time    CALCIUM 9.3 10/22/2018 1017    ALKPHOS 61 10/22/2018 1017    AST 19 10/22/2018 1017    ALT 32 10/22/2018 1017    BILITOT 0.5 10/22/2018 1017    ESTGFRAFRICA >60.0 10/22/2018 1017    EGFRNONAA >60.0 10/22/2018 1017          Assessment:       1. Skin infection    2. Malignant tumor of palatine tonsil    3. Secondary malignancy of cervicofacial lymph node        Plan:           Patient seen in urgent care with cellulitis and an acne type lesion to nose. Vancomycin 1000 mg IV today. RX Bactrim and Famvir sent to pharmacy. RTC on Thursday if no improvement.   Keep scheduled f/u with Dr. Castillo for labs and chemo. Continue RT.  Discussed infectious s/s and he understands to call if he develops any and/or if current symptoms get worse.     Patient is in agreement with the proposed treatment plan. All questions were answered to the patient's satisfaction. Pt knows to call clinic for any new or worsening symptoms and if anything is needed before the next clinic visit.      Kathryn Trammell, SPENSERP-C  Hematology & Oncology  [address] [place] [address]  ph. [phone]  Fax. [phone]     I spent 30 minutes (face to face) with the patient, more than 50% was in counseling and coordination of care as detailed above.        Distress Screening Results: Psychosocial Distress screening score of Distress Score: 3 noted and reviewed. No intervention  indicated.

## 2018-10-22 NOTE — PLAN OF CARE
Problem: Patient Care Overview  Goal: Plan of Care Review  Outcome: Ongoing (interventions implemented as appropriate)  Pt here for iv vancomycin, lab LDH sent to lab, hx, meds, allergies reviewed, pt with pain to nose , nose red and swollen with small sore to right nostril, reclined in chair, continue to monitor

## 2018-10-22 NOTE — TELEPHONE ENCOUNTER
Spoke with patient---he doesn't know how to work [facility], especially sending a picture. He will send it to me an other way---and I will forward to you.  ~fariba

## 2018-10-22 NOTE — PLAN OF CARE
Problem: Patient Care Overview  Goal: Plan of Care Review  Outcome: Ongoing (interventions implemented as appropriate)  Pt tolerated vancomycin infusion without issue, pt d/c to radiation no distress noted

## 2018-10-22 NOTE — TELEPHONE ENCOUNTER
----- Message from Stephanie Francis sent at 10/22/2018  7:55 AM CDT -----  Contact: Pt  Pt called to speak with nurse have a few questions   Callback#[phone]  Thank You  RC Francis

## 2018-10-23 ENCOUNTER — RESEARCH ENCOUNTER (OUTPATIENT)
Dept: RESEARCH | Facility: HOSPITAL | Age: 48
End: 2018-10-23

## 2018-10-23 DIAGNOSIS — C09.9 MALIGNANT TUMOR OF PALATINE TONSIL: ICD-10-CM

## 2018-10-23 PROCEDURE — 77386 HC IMRT, COMPLEX: CPT | Performed by: RADIOLOGY

## 2018-10-23 PROCEDURE — G6002 STEREOSCOPIC X-RAY GUIDANCE: HCPCS | Mod: 26,,, | Performed by: RADIOLOGY

## 2018-10-23 NOTE — PROGRESS NOTES
Oct 18,2018     Protocol: Phase 2, multi-center, randomized, double-blind, placebo controlled study to assess the safety and efficacy of topically applied  for the attenuation of oral mucositis in subjects with cancers of the head and neck receiving concomitant chemoradion therapy.    Protocol # -[id number]  IRB # 2017.177.B  PI: Karlos Ontiveros MD  Version Date: 26-Mar-2018     Week 1 Visit 2:     The following assessments and procedures were conducted:      Recorded AEs and SAEs.  Pt is reporting fatigue since his infusion.   Recorded analgesia use for oral pain.  Pt states he is using no pain medication at this time.    Recorded any concomitant medications.  Patient reports no new use of any mediations.    Recorded use and/or placement of gastrostomy tube.  Pt does not currently have a PEG.   Reviewed daily diary with patient.  Pt states he has been compliant with his daily diary.     Reviewed with the patient the analgesic information written in the diary and ensured complete analgesic information.  The importance of completing the diary reviewed with patient.    The OMDQ was completed prior to the oral assessment      Assessment of compliance with caries prevention regimen was done.  Pt states he has been compliant.   Assessment compliance with IMP dosing was done.  Pt states he has been compliant.   OM assessment and recorded and sent to study.  Pt with no mucositis, redness, soreness or pain.      For most up to date Adverse Event log please see Research shadow paper chart.         Review of Adverse Events:  Fatigue.  Grade 1. AE Ongoing. Will monitor     Baseline Medical History:    Erectile dysfunction. Grade 2.  Pt takes Viagra  Anxiety.  Grade 1   Diverticulitis.  History of .   Grade 1   Left neck pain.  Grade 1  Throat pain.  Grade 1  Right tonsillectomy

## 2018-10-23 NOTE — PROGRESS NOTES
Oct 23 ,2018     Protocol: Phase 2, multi-center, randomized, double-blind, placebo controlled study to assess the safety and efficacy of topically applied  for the attenuation of oral mucositis in subjects with cancers of the head and neck receiving concomitant chemoradion therapy.    Protocol # -[id number]  IRB # 2017.177.B  PI: Karlos Ontiveros MD  Version Date: 26-Mar-2018     Week 2 Visit 1:      Patient presented to the clinic today for the above mentioned treatment.  Patient alert and oriented x 3. Mood and affect are appropriate to the situation. Patient denies any fever, pain or shortness of breath. Denies any vomiting, diarrhea or constipation at this time. Pt denies any mouth sores. Pt states he has had fatigue since his infusion, but this is improving.  Since his last visit patient stated he squeezed a pimple on his nose and woke the next morning with a red and swollen nose.  Pt reported to the urgent care clinic where he received IV antibiotics and discharged on oral antibiotics for a skin infection.  Reviewed and confirmed which concomitant medications with the patient he is currently taking. See concomitant therapy worksheet. Patient to be examined by  Dr. Ontiveros on 11/24/18.  Lab work resulted and reviewed by Dr. Castillo.  Lab work out of range = not clinically significant per Dr. Castillo. The patient verbalized understanding of this information and are in agreement of this treatment plan.      OM assessment completed.  No mucositis noted on exam, but did have some redness in his throat.   Patient states he has been compliant with study medication and daily diary.       Kit numbers [id number] and [id number] dispensed to pt.     Kit numbers 1214 and [id number] both returned with 5 unused bottles and 19 used bottles in each kit.     Pt to return on 10/26/18 for week 2, visit 2.     For most up to date Adverse Event log please see Research shadow paper chart.         Review of Adverse Events:  Fatigue.  Grade 1. AE Ongoing.  Will monitor.  Skin Infection.  Grade 3.  AE ongoing.  Will monitor.      Baseline Medical History:    Erectile dysfunction. Grade 2.  Pt takes Viagra  Anxiety.  Grade 1   Diverticulitis.  History of .   Grade 1   Left neck pain.  Grade 1  Throat pain.  Grade 1  Right tonsillectomy

## 2018-10-24 ENCOUNTER — DOCUMENTATION ONLY (OUTPATIENT)
Dept: RADIATION ONCOLOGY | Facility: CLINIC | Age: 48
End: 2018-10-24

## 2018-10-24 PROCEDURE — 77386 HC IMRT, COMPLEX: CPT | Performed by: RADIOLOGY

## 2018-10-24 PROCEDURE — G6002 STEREOSCOPIC X-RAY GUIDANCE: HCPCS | Mod: 26,,, | Performed by: RADIOLOGY

## 2018-10-24 NOTE — PLAN OF CARE
Problem: Patient Care Overview  Goal: Plan of Care Review  Outcome: Ongoing (interventions implemented as appropriate)  Day 7 of XRT to the left tonsill/neck. C/O congestion and sinus headache. Denies N&V. States taste is changing. Slight erythema to left side of neck.

## 2018-10-25 PROCEDURE — 77386 HC IMRT, COMPLEX: CPT | Performed by: RADIOLOGY

## 2018-10-25 PROCEDURE — G6002 STEREOSCOPIC X-RAY GUIDANCE: HCPCS | Mod: 26,,, | Performed by: RADIOLOGY

## 2018-10-26 ENCOUNTER — RESEARCH ENCOUNTER (OUTPATIENT)
Dept: RESEARCH | Facility: HOSPITAL | Age: 48
End: 2018-10-26

## 2018-10-26 DIAGNOSIS — L08.9 SKIN INFECTION: ICD-10-CM

## 2018-10-26 DIAGNOSIS — L08.9 SKIN INFECTION: Primary | ICD-10-CM

## 2018-10-26 PROCEDURE — 77386 HC IMRT, COMPLEX: CPT | Performed by: RADIOLOGY

## 2018-10-26 PROCEDURE — G6002 STEREOSCOPIC X-RAY GUIDANCE: HCPCS | Mod: 26,,, | Performed by: RADIOLOGY

## 2018-10-26 RX ORDER — SULFAMETHOXAZOLE AND TRIMETHOPRIM 800; 160 MG/1; MG/1
2 TABLET ORAL 2 TIMES DAILY
Qty: 28 TABLET | Refills: 0 | Status: SHIPPED | OUTPATIENT
Start: 2018-10-26 | End: 2019-01-07

## 2018-10-29 PROCEDURE — G6002 STEREOSCOPIC X-RAY GUIDANCE: HCPCS | Mod: 26,,, | Performed by: RADIOLOGY

## 2018-10-29 PROCEDURE — 77336 RADIATION PHYSICS CONSULT: CPT | Performed by: RADIOLOGY

## 2018-10-29 PROCEDURE — 77386 HC IMRT, COMPLEX: CPT | Performed by: RADIOLOGY

## 2018-10-29 NOTE — PROGRESS NOTES
Oct 26,2018     Protocol: Phase 2, multi-center, randomized, double-blind, placebo controlled study to assess the safety and efficacy of topically applied  for the attenuation of oral mucositis in subjects with cancers of the head and neck receiving concomitant chemoradion therapy.    Protocol # -[id number]  IRB # 2017.177.B  PI: Karlos Ontiveros MD  Version Date: 26-Mar-2018     Week 2 Visit 2:     The following assessments and procedures were conducted:      Recorded AEs and SAEs.  Pt is reporting fatigue since his infusion, but is improving.   Recorded analgesia use for oral pain.  Pt states he is using no pain medication at this time.    Recorded any concomitant medications.  Patient reports no new use of any mediations.    Recorded use and/or placement of gastrostomy tube.  Pt does not currently have a PEG.   Reviewed daily diary with patient.  Pt states he has been compliant with his daily diary.     Reviewed with the patient the analgesic information written in the diary and ensured complete analgesic information.  The importance of completing the diary reviewed with patient.    The OMDQ was completed prior to the oral assessment      Assessment of compliance with caries prevention regimen was done.  Pt states he has been compliant.   Assessment compliance with IMP dosing was done.  Pt states he has been compliant.   OM assessment and recorded and sent to study.  Pt with no mucositis, redness, soreness or pain.     Pt with continued redness of his nose with a sore on right nares that has purulent drainage.  Patient states he has been taking his antibiotics and will finish on 10/29/18.  Per Dr. Ontiveros drainage sent for culture and sensitivity.      For most up to date Adverse Event log please see Research shadow paper chart.         Review of Adverse Events:  Fatigue.  Grade 1. AE Ongoing. Will monitor  Skin Infection.  Grade 3.  AE ongoing.  Will monitor.      Baseline Medical History:    Erectile  dysfunction. Grade 2.  Pt takes Viagra  Anxiety.  Grade 1   Diverticulitis.  History of .   Grade 1   Left neck pain.  Grade 1  Throat pain.  Grade 1  Right tonsillectomy

## 2018-10-30 DIAGNOSIS — C09.9 MALIGNANT TUMOR OF PALATINE TONSIL: ICD-10-CM

## 2018-10-30 PROCEDURE — 77386 HC IMRT, COMPLEX: CPT | Performed by: RADIOLOGY

## 2018-10-30 PROCEDURE — G6002 STEREOSCOPIC X-RAY GUIDANCE: HCPCS | Mod: 26,,, | Performed by: RADIOLOGY

## 2018-10-30 PROCEDURE — 77417 THER RADIOLOGY PORT IMAGE(S): CPT | Performed by: RADIOLOGY

## 2018-10-31 ENCOUNTER — DOCUMENTATION ONLY (OUTPATIENT)
Dept: RADIATION ONCOLOGY | Facility: CLINIC | Age: 48
End: 2018-10-31

## 2018-10-31 PROCEDURE — 77386 HC IMRT, COMPLEX: CPT | Performed by: RADIOLOGY

## 2018-10-31 PROCEDURE — G6002 STEREOSCOPIC X-RAY GUIDANCE: HCPCS | Mod: 26,,, | Performed by: RADIOLOGY

## 2018-10-31 NOTE — PLAN OF CARE
Problem: Patient Care Overview  Goal: Plan of Care Review  Outcome: Ongoing (interventions implemented as appropriate)  Day 11 of XRT to the left tonsil. On  study. Feels ok. Slightly irritated throat. Not taking pain meds. Eats well but has poor taste. Nose sore improved.

## 2018-11-01 ENCOUNTER — HOSPITAL ENCOUNTER (OUTPATIENT)
Dept: RADIATION THERAPY | Facility: HOSPITAL | Age: 48
Discharge: HOME | End: 2018-11-01
Attending: RADIOLOGY
Payer: COMMERCIAL

## 2018-11-01 PROCEDURE — 77386 HC IMRT, COMPLEX: CPT | Performed by: RADIOLOGY

## 2018-11-01 PROCEDURE — G6002 STEREOSCOPIC X-RAY GUIDANCE: HCPCS | Mod: 26,,, | Performed by: RADIOLOGY

## 2018-11-02 ENCOUNTER — RESEARCH ENCOUNTER (OUTPATIENT)
Dept: RESEARCH | Facility: HOSPITAL | Age: 48
End: 2018-11-02

## 2018-11-02 VITALS
TEMPERATURE: 98 F | SYSTOLIC BLOOD PRESSURE: 134 MMHG | HEART RATE: 78 BPM | OXYGEN SATURATION: 98 % | DIASTOLIC BLOOD PRESSURE: 83 MMHG | RESPIRATION RATE: 17 BRPM

## 2018-11-02 DIAGNOSIS — K12.30 ORAL MUCOSITIS: Primary | ICD-10-CM

## 2018-11-02 PROCEDURE — G6002 STEREOSCOPIC X-RAY GUIDANCE: HCPCS | Mod: 26,,, | Performed by: RADIOLOGY

## 2018-11-02 PROCEDURE — 77386 HC IMRT, COMPLEX: CPT | Performed by: RADIOLOGY

## 2018-11-02 RX ORDER — LIDOCAINE HYDROCHLORIDE 20 MG/ML
SOLUTION OROPHARYNGEAL
Qty: 500 ML | Refills: 3 | Status: SHIPPED | OUTPATIENT
Start: 2018-11-02 | End: 2018-11-05 | Stop reason: SDUPTHER

## 2018-11-02 NOTE — PROGRESS NOTES
Nov 02, 2018     Protocol: Phase 2, multi-center, randomized, double-blind, placebo controlled study to assess the safety and efficacy of topically applied  for the attenuation of oral mucositis in subjects with cancers of the head and neck receiving concomitant chemoradion therapy.    Protocol # -[id number]  IRB # 2017.177.B  PI: Karlos Ontiveros MD  Version Date: 26-Mar-2018     Week 3 Visit 2:     The following assessments and procedures were conducted:      Recorded AEs and SAEs.     Recorded analgesia use for oral pain.  Pt states he is taking tylenol occasionally and used percocet once for pain mostly coming from the left side of this tongue.  Viscous lidocaine sent to pharmacy for oral pain.   Recorded any concomitant medications.  Patient reports no new use of any mediations.    Recorded use and/or placement of gastrostomy tube.  Pt does not currently have a PEG.   Reviewed daily diary with patient.  Pt states he has been compliant with his daily diary.     Reviewed with the patient the analgesic information written in the diary and ensured complete analgesic information.  The importance of completing the diary reviewed with patient.    The OMDQ was completed prior to the oral assessment      Assessment of compliance with caries prevention regimen was done.  Pt states he has been compliant.   Assessment compliance with IMP dosing was done.  Pt states he has been compliant.   OM assessment and recorded and sent to study.      Skin infection on his nose is now resolved and pt has finished his antibiotic.       For most up to date Adverse Event log please see Research shadow paper chart.         Review of Adverse Events:  Fatigue.  Grade 1. AE Ongoing. Will monitor       Baseline Medical History:    Erectile dysfunction. Grade 2.  Pt takes Viagra  Anxiety.  Grade 1   Diverticulitis.  History of .   Grade 1   Left neck pain.  Grade 1  Throat pain.  Grade 1  Right tonsillectomy

## 2018-11-05 ENCOUNTER — RESEARCH ENCOUNTER (OUTPATIENT)
Dept: RESEARCH | Facility: HOSPITAL | Age: 48
End: 2018-11-05

## 2018-11-05 ENCOUNTER — LAB VISIT (OUTPATIENT)
Dept: LAB | Facility: HOSPITAL | Age: 48
End: 2018-11-05
Attending: INTERNAL MEDICINE
Payer: COMMERCIAL

## 2018-11-05 ENCOUNTER — OFFICE VISIT (OUTPATIENT)
Dept: HEMATOLOGY/ONCOLOGY | Facility: CLINIC | Age: 48
End: 2018-11-05
Payer: COMMERCIAL

## 2018-11-05 ENCOUNTER — INFUSION (OUTPATIENT)
Dept: INFUSION THERAPY | Facility: HOSPITAL | Age: 48
End: 2018-11-05
Attending: INTERNAL MEDICINE
Payer: COMMERCIAL

## 2018-11-05 VITALS
HEART RATE: 75 BPM | HEIGHT: 72 IN | RESPIRATION RATE: 18 BRPM | OXYGEN SATURATION: 96 % | TEMPERATURE: 99 F | WEIGHT: 255.31 LBS | DIASTOLIC BLOOD PRESSURE: 70 MMHG | BODY MASS INDEX: 34.58 KG/M2 | SYSTOLIC BLOOD PRESSURE: 130 MMHG

## 2018-11-05 VITALS
HEART RATE: 64 BPM | RESPIRATION RATE: 18 BRPM | TEMPERATURE: 98 F | DIASTOLIC BLOOD PRESSURE: 78 MMHG | SYSTOLIC BLOOD PRESSURE: 130 MMHG

## 2018-11-05 DIAGNOSIS — K12.30 ORAL MUCOSITIS: ICD-10-CM

## 2018-11-05 DIAGNOSIS — C09.9 MALIGNANT TUMOR OF PALATINE TONSIL: ICD-10-CM

## 2018-11-05 DIAGNOSIS — L08.9 SKIN INFECTION: Primary | ICD-10-CM

## 2018-11-05 DIAGNOSIS — C77.0 SECONDARY MALIGNANCY OF CERVICOFACIAL LYMPH NODE: ICD-10-CM

## 2018-11-05 DIAGNOSIS — Z09 CHEMOTHERAPY FOLLOW-UP EXAMINATION: ICD-10-CM

## 2018-11-05 DIAGNOSIS — C09.9 TONSIL CANCER: ICD-10-CM

## 2018-11-05 DIAGNOSIS — Z00.6 EXAMINATION OF PARTICIPANT IN CLINICAL TRIAL: ICD-10-CM

## 2018-11-05 DIAGNOSIS — C09.9 MALIGNANT TUMOR OF PALATINE TONSIL: Primary | ICD-10-CM

## 2018-11-05 LAB
ALBUMIN SERPL BCP-MCNC: 3.2 G/DL
ALP SERPL-CCNC: 55 U/L
ALT SERPL W/O P-5'-P-CCNC: 23 U/L
ANION GAP SERPL CALC-SCNC: 5 MMOL/L
AST SERPL-CCNC: 20 U/L
BILIRUB SERPL-MCNC: 0.7 MG/DL
BUN SERPL-MCNC: 19 MG/DL
CALCIUM SERPL-MCNC: 9 MG/DL
CHLORIDE SERPL-SCNC: 105 MMOL/L
CO2 SERPL-SCNC: 28 MMOL/L
CREAT SERPL-MCNC: 0.9 MG/DL
ERYTHROCYTE [DISTWIDTH] IN BLOOD BY AUTOMATED COUNT: 12.4 %
EST. GFR  (AFRICAN AMERICAN): >60 ML/MIN/1.73 M^2
EST. GFR  (NON AFRICAN AMERICAN): >60 ML/MIN/1.73 M^2
GLUCOSE SERPL-MCNC: 149 MG/DL
HCT VFR BLD AUTO: 36.5 %
HGB BLD-MCNC: 12.6 G/DL
IMM GRANULOCYTES # BLD AUTO: 0.01 K/UL
LDH SERPL L TO P-CCNC: 111 U/L
MAGNESIUM SERPL-MCNC: 2 MG/DL
MCH RBC QN AUTO: 30.2 PG
MCHC RBC AUTO-ENTMCNC: 34.5 G/DL
MCV RBC AUTO: 88 FL
NEUTROPHILS # BLD AUTO: 1.8 K/UL
PHOSPHATE SERPL-MCNC: 2.7 MG/DL
PLATELET # BLD AUTO: 244 K/UL
PMV BLD AUTO: 8.5 FL
POTASSIUM SERPL-SCNC: 4.4 MMOL/L
PROT SERPL-MCNC: 6.6 G/DL
RBC # BLD AUTO: 4.17 M/UL
SODIUM SERPL-SCNC: 138 MMOL/L
WBC # BLD AUTO: 3.23 K/UL

## 2018-11-05 PROCEDURE — 3008F BODY MASS INDEX DOCD: CPT | Mod: CPTII,S$GLB,, | Performed by: INTERNAL MEDICINE

## 2018-11-05 PROCEDURE — 85027 COMPLETE CBC AUTOMATED: CPT

## 2018-11-05 PROCEDURE — 84100 ASSAY OF PHOSPHORUS: CPT

## 2018-11-05 PROCEDURE — 25000003 PHARM REV CODE 250: Performed by: INTERNAL MEDICINE

## 2018-11-05 PROCEDURE — 83735 ASSAY OF MAGNESIUM: CPT

## 2018-11-05 PROCEDURE — 96367 TX/PROPH/DG ADDL SEQ IV INF: CPT

## 2018-11-05 PROCEDURE — G6002 STEREOSCOPIC X-RAY GUIDANCE: HCPCS | Mod: 26,,, | Performed by: RADIOLOGY

## 2018-11-05 PROCEDURE — 63600175 PHARM REV CODE 636 W HCPCS: Mod: TB | Performed by: INTERNAL MEDICINE

## 2018-11-05 PROCEDURE — 99215 OFFICE O/P EST HI 40 MIN: CPT | Mod: S$GLB,,, | Performed by: INTERNAL MEDICINE

## 2018-11-05 PROCEDURE — 80053 COMPREHEN METABOLIC PANEL: CPT

## 2018-11-05 PROCEDURE — 36415 COLL VENOUS BLD VENIPUNCTURE: CPT

## 2018-11-05 PROCEDURE — 77336 RADIATION PHYSICS CONSULT: CPT | Performed by: RADIOLOGY

## 2018-11-05 PROCEDURE — 63600175 PHARM REV CODE 636 W HCPCS: Mod: JG | Performed by: INTERNAL MEDICINE

## 2018-11-05 PROCEDURE — C9463 INJECTION, APREPITANT: HCPCS | Mod: TB | Performed by: INTERNAL MEDICINE

## 2018-11-05 PROCEDURE — 96366 THER/PROPH/DIAG IV INF ADDON: CPT

## 2018-11-05 PROCEDURE — 96413 CHEMO IV INFUSION 1 HR: CPT

## 2018-11-05 PROCEDURE — 83615 LACTATE (LD) (LDH) ENZYME: CPT

## 2018-11-05 PROCEDURE — 99999 PR PBB SHADOW E&M-EST. PATIENT-LVL IV: CPT | Mod: PBBFAC,,, | Performed by: INTERNAL MEDICINE

## 2018-11-05 PROCEDURE — 77386 HC IMRT, COMPLEX: CPT | Performed by: RADIOLOGY

## 2018-11-05 RX ORDER — HEPARIN 100 UNIT/ML
500 SYRINGE INTRAVENOUS
Status: DISCONTINUED | OUTPATIENT
Start: 2018-11-05 | End: 2018-11-05 | Stop reason: HOSPADM

## 2018-11-05 RX ORDER — SODIUM CHLORIDE 0.9 % (FLUSH) 0.9 %
10 SYRINGE (ML) INJECTION
Status: DISCONTINUED | OUTPATIENT
Start: 2018-11-05 | End: 2018-11-05 | Stop reason: HOSPADM

## 2018-11-05 RX ORDER — HEPARIN 100 UNIT/ML
500 SYRINGE INTRAVENOUS
Status: CANCELLED | OUTPATIENT
Start: 2018-11-06

## 2018-11-05 RX ORDER — HEPARIN 100 UNIT/ML
500 SYRINGE INTRAVENOUS
Status: CANCELLED | OUTPATIENT
Start: 2018-11-07

## 2018-11-05 RX ORDER — SODIUM CHLORIDE 0.9 % (FLUSH) 0.9 %
10 SYRINGE (ML) INJECTION
Status: CANCELLED | OUTPATIENT
Start: 2018-11-07

## 2018-11-05 RX ORDER — OXYCODONE AND ACETAMINOPHEN 7.5; 325 MG/1; MG/1
1 TABLET ORAL EVERY 4 HOURS PRN
Qty: 60 TABLET | Refills: 0 | Status: SHIPPED | OUTPATIENT
Start: 2018-11-05 | End: 2019-01-22

## 2018-11-05 RX ORDER — SODIUM CHLORIDE 0.9 % (FLUSH) 0.9 %
10 SYRINGE (ML) INJECTION
Status: CANCELLED | OUTPATIENT
Start: 2018-11-06

## 2018-11-05 RX ORDER — LIDOCAINE HYDROCHLORIDE 20 MG/ML
SOLUTION OROPHARYNGEAL
Qty: 500 ML | Refills: 3 | Status: SHIPPED | OUTPATIENT
Start: 2018-11-05 | End: 2019-01-22

## 2018-11-05 RX ORDER — FLUCONAZOLE 40 MG/ML
200 POWDER, FOR SUSPENSION ORAL DAILY
Qty: 60 ML | Refills: 0 | Status: SHIPPED | OUTPATIENT
Start: 2018-11-05 | End: 2018-11-15

## 2018-11-05 RX ORDER — SODIUM CHLORIDE 9 MG/ML
INJECTION, SOLUTION INTRAVENOUS CONTINUOUS
Status: CANCELLED | OUTPATIENT
Start: 2018-11-07

## 2018-11-05 RX ADMIN — VANCOMYCIN HYDROCHLORIDE 1000 MG: 1 INJECTION, POWDER, LYOPHILIZED, FOR SOLUTION INTRAVENOUS at 09:11

## 2018-11-05 RX ADMIN — CISPLATIN 245 MG: 1 INJECTION, SOLUTION INTRAVENOUS at 12:11

## 2018-11-05 RX ADMIN — MAGNESIUM SULFATE: 500 INJECTION, SOLUTION INTRAMUSCULAR; INTRAVENOUS at 02:11

## 2018-11-05 RX ADMIN — MAGNESIUM SULFATE: 500 INJECTION, SOLUTION INTRAMUSCULAR; INTRAVENOUS at 10:11

## 2018-11-05 RX ADMIN — DEXAMETHASONE SODIUM PHOSPHATE: 4 INJECTION, SOLUTION INTRA-ARTICULAR; INTRALESIONAL; INTRAMUSCULAR; INTRAVENOUS; SOFT TISSUE at 12:11

## 2018-11-05 NOTE — PROGRESS NOTES
Nov 06,2018     Protocol: Phase 2, multi-center, randomized, double-blind, placebo controlled study to assess the safety and efficacy of topically applied  for the attenuation of oral mucositis in subjects with cancers of the head and neck receiving concomitant chemoradion therapy.    Protocol # -[id number]  IRB # 2017.177.B  PI: Karlos Ontiveros MD  Version Date: 26-Mar-2018     Week 4 Visit 1:      Patient presented to the clinic today for the above mentioned treatment.  Patient alert and oriented x 3. Mood and affect are appropriate to the situation. Patient denies any fever, pain or shortness of breath. Denies any vomiting, diarrhea or constipation at this time.  Pt states his fatigue is much improved.  Since his last visit patient has had increased in mouth pain with a sore on the left side of this tongue. Reviewed and confirmed which concomitant medications with the patient he is currently taking. See concomitant therapy worksheet. Patient to be examined by Dr. Castillo on 11/5/18.  Patient with some mucositis and possible thrush in mouth patient to receive Diflucan and Vancomycin.  No other clinically significant findings noted on exam.    Lab work resulted and reviewed by Dr. Castillo.  Lab work out of range = not clinically significant per Dr. Castillo.  The patient verbalized understanding of this information and are in agreement of this treatment plan.      OM assessment completed.  Mucositis noted on exam, to soft palate, left lateral tongue and left cheek.   Patient states he has been compliant with his caries kit and daily diary.       Kit numbers [id number] and [id number] dispensed to pt.     Kit numbers 1219 and [id number] both returned with 6 unused bottles and 18 used bottles in each kit.     Pt to return on 11/09/18 for week 4, visit 2.     For most up to date Adverse Event log please see Research shadow paper chart.         Review of Adverse Events:  Fatigue.  Grade 1. AE Ongoing. Will monitor.  Mucosal infection. Grade  2.  AE ongoing.  Will monitor.   Nausea, intermittent.  Grade 1.  AE ongoing.  Will monitor.        Baseline Medical History:    Erectile dysfunction. Grade 2.  Pt takes Viagra  Anxiety.  Grade 1   Diverticulitis.  History of .   Grade 1   Left neck pain.  Grade 1  Throat pain.  Grade 1  Right tonsillectomy

## 2018-11-05 NOTE — PLAN OF CARE
Problem: Patient Care Overview  Goal: Plan of Care Review  Outcome: Ongoing (interventions implemented as appropriate)  1545:  Patient tolerated treatment well, denies any new complaints voiced, NAD noted.  PIV removed intact, at patient's request.  AVS declined.  Will return tomorrow for IVF's.  Released in stable condition.

## 2018-11-05 NOTE — NURSING
0830:  [facility], from MD office.  VSS, assessment completed.  Per orders will be administering Vancomycin, in addition to chemo today.  PIV placed without difficulty.  Patient gone to Radiation while awaiting meds from pharmacy.

## 2018-11-05 NOTE — PLAN OF CARE
Problem: Patient Care Overview  Goal: Plan of Care Review  Outcome: Ongoing (interventions implemented as appropriate)  IV antibiotic will also be administered tomorrow.

## 2018-11-05 NOTE — PROGRESS NOTES
Subjective:       Patient ID: Frankie Hoyt is a 48 y.o. male.    Chief Complaint: Malignant tumor of palatine tonsil; odynophagia; Dysphagia; and tongue/gums---raw, blisters  ONCOLOGIC HISTORY: Mr. Frankie Hoyt is a 48-year-old smoker and tobacco chewer who had waxing and waning neck mass for the last 18 months.  He underwent a CT scan in February 2017 that was read as negative.  He noticed that his left cheek swelling really got worse in July 2018.  He was seen in Urgent Care and received antibiotics without any improvement.  Then he saw Dr. Franks and underwent FNA of the left neck, which was positive for malignant cells.  She also noted left tonsillar asymmetry.  CT neck at this time showed a mass in the left neck at the level of the angle of the mandible, which represented an   enlarged lymph node measuring 3.9 x 2.4 x 5.2 cm, mass demonstrated irregular margins in keeping with extracapsular extension.  There was encasement of the external carotid artery and internal jugular vein with less than 180-degree abutment of the internal and distal common carotid artery.  There was prominence of the left palatine tonsil as well as fullness of the left vallecula.  He was seen by Dr. Mccray and underwent a PET scan, which showed a hypermetabolic left cervical mass and bilateral palatine tonsils.  There was a low level uptake in the borderline right-sided cervical lymph node and no evidence of distant   Metastasis       HPI He comes in for cycle 2 of chemo/RT with Cisplatin  He denies any nausea and vomiting, diarrhea, constipation, abdominal pain, weight loss or loss of appetite, chest pain, shortness of breath, leg swelling, fatigue, pain, headache, dizziness, or mood changes. He has been having trouble swallowing since 1 weeks. Notes lack of taste. He is still able to eat food of all consistency. He has not eaten steak. His ECOG PS is zero. He is accompanied by his wife.        Review of Systems   Constitutional:  Negative for appetite change, fatigue and unexpected weight change.   HENT: Negative for mouth sores.    Eyes: Negative for visual disturbance.   Respiratory: Negative for cough and shortness of breath.    Cardiovascular: Negative for chest pain.   Gastrointestinal: Negative for abdominal pain and diarrhea.   Genitourinary: Negative for frequency.   Musculoskeletal: Negative for back pain.   Skin: Negative for rash.   Neurological: Negative for headaches.   Hematological: Negative for adenopathy.   Psychiatric/Behavioral: The patient is not nervous/anxious.    All other systems reviewed and are negative.      Objective:      Physical Exam   Constitutional: He is oriented to person, place, and time. He appears well-developed and well-nourished.   HENT:   Mouth/Throat: No oropharyngeal exudate.   Cardiovascular: Normal rate and normal heart sounds.   Pulmonary/Chest: Effort normal and breath sounds normal. He has no wheezes.   Abdominal: Soft. Bowel sounds are normal. There is no tenderness.   Musculoskeletal: He exhibits no edema or tenderness.   Lymphadenopathy:     He has no cervical adenopathy.   Neurological: He is alert and oriented to person, place, and time. Coordination normal.   Skin: Skin is warm and dry. No rash noted.   Psychiatric: He has a normal mood and affect. Judgment and thought content normal.   Vitals reviewed.            LABS:  WBC   Date Value Ref Range Status   11/05/2018 3.23 (L) 3.90 - 12.70 K/uL Final     Hemoglobin   Date Value Ref Range Status   11/05/2018 12.6 (L) 14.0 - 18.0 g/dL Final     Hematocrit   Date Value Ref Range Status   11/05/2018 36.5 (L) 40.0 - 54.0 % Final     Platelets   Date Value Ref Range Status   11/05/2018 244 150 - 350 K/uL Final     Gran # (ANC)   Date Value Ref Range Status   11/05/2018 1.8 1.8 - 7.7 K/uL Final     Comment:     The ANC is based on a white cell differential from an   automated cell counter. It has not been microscopically   reviewed for the  presence of abnormal cells. Clinical   correlation is required.         Chemistry        Component Value Date/Time     11/05/2018 0714    K 4.4 11/05/2018 0714     11/05/2018 0714    CO2 28 11/05/2018 0714    BUN 19 11/05/2018 0714    CREATININE 0.9 11/05/2018 0714     (H) 11/05/2018 0714        Component Value Date/Time    CALCIUM 9.0 11/05/2018 0714    ALKPHOS 55 11/05/2018 0714    AST 20 11/05/2018 0714    ALT 23 11/05/2018 0714    BILITOT 0.7 11/05/2018 0714    ESTGFRAFRICA >60.0 11/05/2018 0714    EGFRNONAA >60.0 11/05/2018 0714          Assessment:       1. 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 Chemotherapy follow-up examination    4. Oral mucositis        Plan:        1,2,3. He will proceed with cycle 2 of Cisplatin and will return in 3 weeks for cycle 3.  4. Continue on trial. Also added Diflucan and Vancomycin.    Above care plan was discussed with patient and accompanying wife and all questions were addressed to their satisfaction

## 2018-11-05 NOTE — Clinical Note
Add VAncomycin to chemo today and also for tomorrowSchedule CBC, CMP, mag and phos weekly. Schedule CBC< CMP, MAg and phos and see me in 3 weeks and for Cisplatin. IV fluids on day 3

## 2018-11-06 ENCOUNTER — INFUSION (OUTPATIENT)
Dept: INFUSION THERAPY | Facility: HOSPITAL | Age: 48
End: 2018-11-06
Attending: INTERNAL MEDICINE
Payer: COMMERCIAL

## 2018-11-06 DIAGNOSIS — C09.9 MALIGNANT TUMOR OF PALATINE TONSIL: ICD-10-CM

## 2018-11-06 DIAGNOSIS — L08.9 SKIN INFECTION: Primary | ICD-10-CM

## 2018-11-06 PROCEDURE — 96365 THER/PROPH/DIAG IV INF INIT: CPT

## 2018-11-06 PROCEDURE — 96360 HYDRATION IV INFUSION INIT: CPT

## 2018-11-06 PROCEDURE — 77386 HC IMRT, COMPLEX: CPT | Performed by: RADIOLOGY

## 2018-11-06 PROCEDURE — G6002 STEREOSCOPIC X-RAY GUIDANCE: HCPCS | Mod: 26,,, | Performed by: RADIOLOGY

## 2018-11-06 PROCEDURE — 25000003 PHARM REV CODE 250: Performed by: INTERNAL MEDICINE

## 2018-11-06 PROCEDURE — 96361 HYDRATE IV INFUSION ADD-ON: CPT

## 2018-11-06 PROCEDURE — 63600175 PHARM REV CODE 636 W HCPCS: Performed by: INTERNAL MEDICINE

## 2018-11-06 PROCEDURE — 77417 THER RADIOLOGY PORT IMAGE(S): CPT | Performed by: RADIOLOGY

## 2018-11-06 RX ORDER — SODIUM CHLORIDE 0.9 % (FLUSH) 0.9 %
10 SYRINGE (ML) INJECTION
Status: DISCONTINUED | OUTPATIENT
Start: 2018-11-06 | End: 2018-11-06 | Stop reason: HOSPADM

## 2018-11-06 RX ORDER — SODIUM CHLORIDE 9 MG/ML
INJECTION, SOLUTION INTRAVENOUS CONTINUOUS
Status: ACTIVE | OUTPATIENT
Start: 2018-11-06 | End: 2018-11-06

## 2018-11-06 RX ORDER — HEPARIN 100 UNIT/ML
500 SYRINGE INTRAVENOUS
Status: DISCONTINUED | OUTPATIENT
Start: 2018-11-06 | End: 2018-11-06 | Stop reason: HOSPADM

## 2018-11-06 RX ADMIN — VANCOMYCIN HYDROCHLORIDE 1000 MG: 1 INJECTION, POWDER, LYOPHILIZED, FOR SOLUTION INTRAVENOUS at 02:11

## 2018-11-06 RX ADMIN — SODIUM CHLORIDE: 0.9 INJECTION, SOLUTION INTRAVENOUS at 02:11

## 2018-11-06 NOTE — PLAN OF CARE
Problem: Chemotherapy Effects (Adult)  Goal: Signs and Symptoms of Listed Potential Problems Will be Absent, Minimized or Managed (Chemotherapy Effects)  Signs and symptoms of listed potential problems will be absent, minimized or managed by discharge/transition of care (reference Chemotherapy Effects (Adult) CPG).  Outcome: Ongoing (interventions implemented as appropriate)  Pt admitted for 1LNS hydration fluid and Vancomycin infusion. Side effects of chemo and self-care tips reviewed, Pt verbalized understanding. Pt ambulated onto unit unassisted , accompanied by wife.

## 2018-11-06 NOTE — PLAN OF CARE
Problem: Patient Care Overview  Goal: Plan of Care Review  Outcome: Ongoing (interventions implemented as appropriate)  Pt completed 1 L NS, and vancomycin infusion, Tolerated well. Pt instructed to contact MD with any further concerns or questions, he verbalized understanding.Pt discharged home, ambulated off unit unassisted, accompanied by wife

## 2018-11-07 PROCEDURE — G6002 STEREOSCOPIC X-RAY GUIDANCE: HCPCS | Mod: 26,,, | Performed by: RADIOLOGY

## 2018-11-07 PROCEDURE — 77386 HC IMRT, COMPLEX: CPT | Performed by: RADIOLOGY

## 2018-11-08 ENCOUNTER — TELEPHONE (OUTPATIENT)
Dept: OTOLARYNGOLOGY | Facility: CLINIC | Age: 48
End: 2018-11-08

## 2018-11-08 PROCEDURE — 77386 HC IMRT, COMPLEX: CPT | Performed by: RADIOLOGY

## 2018-11-08 PROCEDURE — G6002 STEREOSCOPIC X-RAY GUIDANCE: HCPCS | Mod: 26,,, | Performed by: RADIOLOGY

## 2018-11-08 NOTE — TELEPHONE ENCOUNTER
----- Message from Lucille Sherman sent at 11/8/2018  9:23 AM CST -----  Contact: patient   Needs Advice    Reason for call: Patient is calling to request that his FMLA documents be faxed to [employer] Fax# [phone]        Communication Preference: Pt# [phone]

## 2018-11-08 NOTE — TELEPHONE ENCOUNTER
----- Message from Sulma Ferreira RN sent at 11/8/2018 10:34 AM CST -----  Contact: patient   Fax when printer working    ----- Message -----  From: Lucille Sherman  Sent: 11/8/2018   9:23 AM  To: Mahendra LAMBERT Staff    Needs Advice    Reason for call: Patient is calling to request that his FMLA documents be faxed to [employer] Fax# [phone]        Communication Preference: Pt# [phone]

## 2018-11-09 ENCOUNTER — DOCUMENTATION ONLY (OUTPATIENT)
Dept: RADIATION ONCOLOGY | Facility: CLINIC | Age: 48
End: 2018-11-09

## 2018-11-09 ENCOUNTER — RESEARCH ENCOUNTER (OUTPATIENT)
Dept: RESEARCH | Facility: HOSPITAL | Age: 48
End: 2018-11-09

## 2018-11-09 PROCEDURE — 77386 HC IMRT, COMPLEX: CPT | Performed by: RADIOLOGY

## 2018-11-09 PROCEDURE — G6002 STEREOSCOPIC X-RAY GUIDANCE: HCPCS | Mod: 26,,, | Performed by: RADIOLOGY

## 2018-11-09 NOTE — PLAN OF CARE
Problem: Patient Care Overview  Goal: Plan of Care Review  Outcome: Ongoing (interventions implemented as appropriate)  Day 18 of XRT to the left tonsil. Denies Headaches. States has only nausea with chemo. Decreased taste and under treatment for thrush. Pt on SD0423 study.

## 2018-11-12 PROCEDURE — 77386 HC IMRT, COMPLEX: CPT | Performed by: RADIOLOGY

## 2018-11-12 PROCEDURE — G6002 STEREOSCOPIC X-RAY GUIDANCE: HCPCS | Mod: 26,,, | Performed by: RADIOLOGY

## 2018-11-13 ENCOUNTER — RESEARCH ENCOUNTER (OUTPATIENT)
Dept: RESEARCH | Facility: HOSPITAL | Age: 48
End: 2018-11-13

## 2018-11-13 DIAGNOSIS — Z00.6 EXAMINATION OF PARTICIPANT IN CLINICAL TRIAL: ICD-10-CM

## 2018-11-13 DIAGNOSIS — C09.9 TONSIL CANCER: Primary | ICD-10-CM

## 2018-11-13 DIAGNOSIS — C09.9 MALIGNANT TUMOR OF PALATINE TONSIL: ICD-10-CM

## 2018-11-13 PROCEDURE — 77336 RADIATION PHYSICS CONSULT: CPT | Performed by: RADIOLOGY

## 2018-11-13 PROCEDURE — G6002 STEREOSCOPIC X-RAY GUIDANCE: HCPCS | Mod: 26,,, | Performed by: RADIOLOGY

## 2018-11-13 PROCEDURE — 77386 HC IMRT, COMPLEX: CPT | Performed by: RADIOLOGY

## 2018-11-13 NOTE — PROGRESS NOTES
Nov 13, 2018     Protocol: Phase 2, multi-center, randomized, double-blind, placebo controlled study to assess the safety and efficacy of topically applied  for the attenuation of oral mucositis in subjects with cancers of the head and neck receiving concomitant chemoradion therapy.    Protocol # -[id number]  IRB # 2017.177.B  PI: Karlos Ontiveros MD  Version Date: 26-Mar-2018     Week 5 Visit 1:      Patient presented to [facility] today for the above mentioned treatment.  Patient alert and oriented x 3. Mood and affect are appropriate to the situation. Patient denies any fever, pain or shortness of breath. Denies any vomiting, diarrhea or constipation at this time. Pt denies any mouth sores. Pt states he is having some fatigue and since yesterday has been suffering with a headache on the left side of his head.  Reviewed and confirmed which concomitant medications with the patient he is currently taking. See concomitant therapy worksheet.  Lab work resulted and reviewed by Dr. Castillo.  Lab work out of range = not clinically significant per Dr. Castillo.  The patient verbalized understanding of this information and are in agreement of this treatment plan.      OM assessment completed. Mucosal infection improved since last visit. See oral assessment worksheet.    Patient states he has been compliant with his caries kit and daily diary.     Kit numbers 1227 and 6000 dispensed to pt.     Kit numbers 1219 and 6016 both returned with 5 unused bottles and 19 used bottles in each kit.     Pt to return on 11/15/18 for week 5, visit 2.     For most up to date Adverse Event log please see Research shadow paper chart.         Review of Adverse Events:  Fatigue.  Grade 1. AE Ongoing. Will monitor.  Nausea.  Grade 1.  AE Ongoing.  Will monitor.       Baseline Medical History:    Erectile dysfunction. Grade 2.  Pt takes Viagra  Anxiety.  Grade 1   Diverticulitis.  History of .   Grade 1   Left neck pain.  Grade 1  Throat  pain.  Grade 1  Right tonsillectomy

## 2018-11-14 PROCEDURE — G6002 STEREOSCOPIC X-RAY GUIDANCE: HCPCS | Mod: 26,,, | Performed by: RADIOLOGY

## 2018-11-14 PROCEDURE — 77386 HC IMRT, COMPLEX: CPT | Performed by: RADIOLOGY

## 2018-11-15 PROCEDURE — 77417 THER RADIOLOGY PORT IMAGE(S): CPT | Performed by: RADIOLOGY

## 2018-11-15 PROCEDURE — 77386 HC IMRT, COMPLEX: CPT | Performed by: RADIOLOGY

## 2018-11-15 PROCEDURE — G6002 STEREOSCOPIC X-RAY GUIDANCE: HCPCS | Mod: 26,,, | Performed by: RADIOLOGY

## 2018-11-16 ENCOUNTER — DOCUMENTATION ONLY (OUTPATIENT)
Dept: RADIATION ONCOLOGY | Facility: CLINIC | Age: 48
End: 2018-11-16

## 2018-11-16 ENCOUNTER — RESEARCH ENCOUNTER (OUTPATIENT)
Dept: RESEARCH | Facility: HOSPITAL | Age: 48
End: 2018-11-16

## 2018-11-16 PROCEDURE — 77014 HC CT GUIDANCE RADIATION THERAPY FLDS PLACEMENT: CPT | Mod: TC | Performed by: RADIOLOGY

## 2018-11-16 PROCEDURE — G6002 STEREOSCOPIC X-RAY GUIDANCE: HCPCS | Mod: 26,,, | Performed by: RADIOLOGY

## 2018-11-16 PROCEDURE — 77386 HC IMRT, COMPLEX: CPT | Performed by: RADIOLOGY

## 2018-11-16 RX ORDER — SILDENAFIL 100 MG/1
100 TABLET, FILM COATED ORAL DAILY PRN
Qty: 6 TABLET | Refills: 1 | Status: SHIPPED | OUTPATIENT
Start: 2018-11-16 | End: 2019-05-02 | Stop reason: SDUPTHER

## 2018-11-16 NOTE — TELEPHONE ENCOUNTER
Natasha's pt; last OV 08/23/2018.    Left detailed message advising patient he will need to schedule a appointment with one of the providers here for future prescriptions.

## 2018-11-16 NOTE — PLAN OF CARE
Problem: Patient Care Overview  Goal: Plan of Care Review  Outcome: Ongoing (interventions implemented as appropriate)  Day 24 of radiation to the h/n still with Dry mouth and dysphagia

## 2018-11-18 PROCEDURE — G6002 STEREOSCOPIC X-RAY GUIDANCE: HCPCS | Mod: 26,,, | Performed by: RADIOLOGY

## 2018-11-18 PROCEDURE — 77386 HC IMRT, COMPLEX: CPT | Performed by: RADIOLOGY

## 2018-11-19 DIAGNOSIS — C09.9 TONSIL CANCER: ICD-10-CM

## 2018-11-19 DIAGNOSIS — Z00.6 EXAMINATION OF PARTICIPANT IN CLINICAL TRIAL: ICD-10-CM

## 2018-11-19 DIAGNOSIS — C09.9 MALIGNANT TUMOR OF PALATINE TONSIL: Primary | ICD-10-CM

## 2018-11-19 PROCEDURE — G6002 STEREOSCOPIC X-RAY GUIDANCE: HCPCS | Mod: 26,,, | Performed by: RADIOLOGY

## 2018-11-19 PROCEDURE — 77417 THER RADIOLOGY PORT IMAGE(S): CPT | Performed by: RADIOLOGY

## 2018-11-19 PROCEDURE — 77336 RADIATION PHYSICS CONSULT: CPT | Performed by: RADIOLOGY

## 2018-11-19 PROCEDURE — 77386 HC IMRT, COMPLEX: CPT | Performed by: RADIOLOGY

## 2018-11-19 NOTE — PROGRESS NOTES
Nov 09, 2018     Protocol: Phase 2, multi-center, randomized, double-blind, placebo controlled study to assess the safety and efficacy of topically applied  for the attenuation of oral mucositis in subjects with cancers of the head and neck receiving concomitant chemoradion therapy.    Protocol # -[id number]  IRB # 2017.177.B  PI: Karlos Ontiveros MD  Version Date: 26-Mar-2018     Week 4 Visit 2:     The following assessments and procedures were conducted:     Recorded AEs and SAEs.    Recorded analgesia use for oral pain.  Pt states he is taking tylenol occasionally and Viscous lidocaine.   Recorded any concomitant medications.  Patient reports no new use of any mediations.  Recorded use and/or placement of gastrostomy tube.  Pt does not currently have a PEG. Reviewed daily diary with patient. Pt states he has been compliant with his daily diary.   Reviewed with the patient the analgesic information written in the diary and ensured complete analgesic information.  The importance of completing the diary reviewed with patient.  The OMDQ was completed prior to the oral assessment     Assessment of compliance with caries prevention regimen was done.  Pt states he has been compliant.  Assessment compliance with IMP dosing was done.  Pt states he has been compliant.  OM assessment and recorded and sent to study.      Mucosal infection in mouth is improving.    Patient to return to clinic on 11/13/18 for Week 5 Day 1.  Biomarker to be drawn at this visit.      For most up to date Adverse Event log please see Research shadow paper chart.         Review of Adverse Events:  Fatigue.  Grade 1. AE Ongoing. Will monitor  Nausea, intermittent. Grade 1.  AE ongoing.  Will monitor.  Mucosal infection.  Grade 1.  AE ongoing.  Will monitor.        Baseline Medical History:    Erectile dysfunction. Grade 2.  Pt takes Viagra  Anxiety.  Grade 1   Diverticulitis.  History of .   Grade 1   Left neck pain.  Grade 1  Throat pain.  Grade  1  Right tonsillectomy

## 2018-11-19 NOTE — PROGRESS NOTES
Nov 16, 2018     Protocol: Phase 2, multi-center, randomized, double-blind, placebo controlled study to assess the safety and efficacy of topically applied  for the attenuation of oral mucositis in subjects with cancers of the head and neck receiving concomitant chemoradion therapy.    Protocol # -[id number]  IRB # 2017.177.B  PI: Nat Castillo MD  Version Date: 26-Mar-2018     Week 5 Visit 2:     The following assessments and procedures were conducted:     Recorded AEs and SAEs.    Recorded analgesia use for oral pain.  Pt states he is taking tylenol occasionally for throat pain.  Patient does complain of dry mouth and some difficulty swallowing.  This is not affecting his ability to eat.   Recorded any concomitant medications.  Patient reports no new use of any mediations.   Recorded use and/or placement of gastrostomy tube.  Pt does not currently have a PEG.  Reviewed daily diary with patient.  Pt states he has been compliant with his daily diary.   Reviewed with the patient the analgesic information written in the diary and ensured complete analgesic information.  The importance of completing the diary reviewed with patient.  The OMDQ was completed prior to the oral assessment     Assessment of compliance with caries prevention regimen was done.  Pt states he has been compliant.  Assessment compliance with IMP dosing was done.  Pt states he has been compliant.  OM assessment and recorded and sent to study.  See assessment form.     Patient to return 11/20/18 for Week 6 visit 1.  Investigational drug dispensed and daily diary exchanged at that visit.      For most up to date Adverse Event log please see Research shadow paper chart.      Review of Adverse Events:  Fatigue.  Grade 1. AE Ongoing. Will monitor  Dysphagia.  Grade 1. AE Ongoing.  Will Monitor  Dry Mouth.  Grade 1.  AE ongoing.  Will monitor.   Nausea.  Intermittent.  Grade 1.  AE Ongoing.  Will monitor.        Baseline Medical History:    Erectile  dysfunction. Grade 2.  Pt takes Viagra  Anxiety.  Grade 1   Diverticulitis.  History of .   Grade 1   Left neck pain.  Grade 1  Throat pain.  Grade 1  Right tonsillectomy

## 2018-11-20 ENCOUNTER — RESEARCH ENCOUNTER (OUTPATIENT)
Dept: RESEARCH | Facility: HOSPITAL | Age: 48
End: 2018-11-20

## 2018-11-20 VITALS
WEIGHT: 234 LBS | BODY MASS INDEX: 31.74 KG/M2 | HEART RATE: 89 BPM | TEMPERATURE: 98 F | SYSTOLIC BLOOD PRESSURE: 117 MMHG | DIASTOLIC BLOOD PRESSURE: 72 MMHG | RESPIRATION RATE: 18 BRPM

## 2018-11-20 PROCEDURE — G6002 STEREOSCOPIC X-RAY GUIDANCE: HCPCS | Mod: 26,,, | Performed by: RADIOLOGY

## 2018-11-20 PROCEDURE — 77386 HC IMRT, COMPLEX: CPT | Performed by: RADIOLOGY

## 2018-11-20 NOTE — PROGRESS NOTES
Nov 20, 2018     Protocol: Phase 2, multi-center, randomized, double-blind, placebo controlled study to assess the safety and efficacy of topically applied  for the attenuation of oral mucositis in subjects with cancers of the head and neck receiving concomitant chemoradion therapy.    Protocol # -[id number]  IRB # 2017.177.B  PI: Nat Castillo MD  Version Date: 26-Mar-2018     Week 6 Visit 1:      Patient presented to [facility] today for the above mentioned treatment.  Patient alert and oriented x 3. Mood and affect are appropriate to the situation. Patient denies any fever, pain or shortness of breath. Denies any vomiting, diarrhea or constipation at this time. Pt denies any mouth sores but states he is having problems with dry mouth and trouble swallowing.  Reviewed and confirmed which concomitant medications with the patient he is currently taking. See concomitant therapy worksheet.  Lab work resulted and reviewed by Dr. Castillo.  Lab work out of range = not clinically significant per Dr. Castillo.  The patient verbalized understanding of this information and are in agreement of this treatment plan.      OM assessment completed. Mucosal infection improved since last visit. See oral assessment worksheet.  Patient states he has been compliant with his caries kit and daily diary.     Kit numbers 1229 and 6091 dispensed to pt.     Kit numbers 1227 and 6089 both returned with 10 unused bottles and 14 used bottles in each kit.   Talked to the patient about the importance of being compliant with his study medication. He states the reason he has missed several doses is due to dry mouth and he can not wait to drink something for 90 minutes after taking the medication.     KPS = 90     Pt to return on 11/15/18 for week 5, visit 2.     For most up to date Adverse Event log please see Research shadow paper chart.         Review of Adverse Events:  Fatigue.  Grade 1. AE Ongoing. Will monitor.  Nausea.  Grade 1.  AE  Ongoing.  Will monitor.  Xerostomia.  Grade 2.  AE Ongoing.  Will monitor.  Dysphagia. Grade 2.  AE ongoing.  Will monitor.      Baseline Medical History:    Erectile dysfunction. Grade 2.  Pt takes Viagra  Anxiety.  Grade 1   Diverticulitis.  History of .   Grade 1   Left neck pain.  Grade 1  Throat pain.  Grade 1  Right tonsillectomy

## 2018-11-21 PROCEDURE — 77386 HC IMRT, COMPLEX: CPT | Performed by: RADIOLOGY

## 2018-11-21 PROCEDURE — G6002 STEREOSCOPIC X-RAY GUIDANCE: HCPCS | Mod: 26,,, | Performed by: RADIOLOGY

## 2018-11-23 ENCOUNTER — RESEARCH ENCOUNTER (OUTPATIENT)
Dept: HEMATOLOGY/ONCOLOGY | Facility: CLINIC | Age: 48
End: 2018-11-23

## 2018-11-23 NOTE — PROGRESS NOTES
Nov 23, 2018     Protocol: Phase 2, multi-center, randomized, double-blind, placebo controlled study to assess the safety and efficacy of topically applied  for the attenuation of oral mucositis in subjects with cancers of the head and neck receiving concomitant chemoradion therapy.    Protocol # -[id number]  IRB # 2017.177.B  PI: Nat Castillo MD  Version Date: 26-Mar-2018     Week 6 Visit 2:     The following assessments and procedures were conducted:     Recorded AEs and SAEs.    Recorded analgesia use for oral pain.  Pt states he is taking tylenol occasionally for throat pain.  Patient does complain of dry mouth and some difficulty swallowing.  This is not affecting his ability to eat.   Recorded any concomitant medications.  Patient reports no new use of any mediations.   Recorded use and/or placement of gastrostomy tube.  Pt does not currently have a PEG.  Reviewed daily diary with patient.  Pt states he has been compliant with his daily diary.   Reviewed with the patient the analgesic information written in the diary and ensured complete analgesic information.  The importance of completing the diary reviewed with patient.  The OMDQ was completed prior to the oral assessment     Assessment of compliance with caries prevention regimen was done.  Pt states he has been compliant.  Assessment compliance with IMP dosing was done.  Pt states he has been compliant.  OM assessment and recorded and sent to study.  See assessment form.     For most up to date Adverse Event log please see Research shadow paper chart.      Review of Adverse Events:  Fatigue.  Grade 1. AE Ongoing. Will monitor  Dysphagia.  Grade 1. AE Ongoing.  Will Monitor  Dry Mouth.  Grade 1.  AE ongoing.  Will monitor.   Nausea.  Intermittent.  Grade 1.  AE Ongoing.  Will monitor.        Baseline Medical History:    Erectile dysfunction. Grade 2.  Pt takes Viagra  Anxiety.  Grade 1   Diverticulitis.  History of .   Grade 1   Left neck pain.  Grade  1  Throat pain.  Grade 1  Right tonsillectomy      Patient to return 11/26/18 for Week 7 visit 1.  Investigational drug dispensed and daily diary exchanged at that visit.

## 2018-11-26 ENCOUNTER — INFUSION (OUTPATIENT)
Dept: INFUSION THERAPY | Facility: HOSPITAL | Age: 48
End: 2018-11-26
Attending: INTERNAL MEDICINE
Payer: COMMERCIAL

## 2018-11-26 ENCOUNTER — RESEARCH ENCOUNTER (OUTPATIENT)
Dept: RESEARCH | Facility: HOSPITAL | Age: 48
End: 2018-11-26

## 2018-11-26 ENCOUNTER — OFFICE VISIT (OUTPATIENT)
Dept: HEMATOLOGY/ONCOLOGY | Facility: CLINIC | Age: 48
End: 2018-11-26
Payer: COMMERCIAL

## 2018-11-26 VITALS
WEIGHT: 230.19 LBS | BODY MASS INDEX: 31.18 KG/M2 | HEART RATE: 85 BPM | DIASTOLIC BLOOD PRESSURE: 74 MMHG | RESPIRATION RATE: 18 BRPM | HEIGHT: 72 IN | SYSTOLIC BLOOD PRESSURE: 134 MMHG

## 2018-11-26 VITALS
HEART RATE: 103 BPM | SYSTOLIC BLOOD PRESSURE: 123 MMHG | TEMPERATURE: 99 F | RESPIRATION RATE: 17 BRPM | DIASTOLIC BLOOD PRESSURE: 73 MMHG | OXYGEN SATURATION: 97 % | WEIGHT: 230.19 LBS | HEIGHT: 72 IN | BODY MASS INDEX: 31.18 KG/M2

## 2018-11-26 DIAGNOSIS — Z09 CHEMOTHERAPY FOLLOW-UP EXAMINATION: ICD-10-CM

## 2018-11-26 DIAGNOSIS — C77.0 SECONDARY MALIGNANCY OF CERVICOFACIAL LYMPH NODE: ICD-10-CM

## 2018-11-26 DIAGNOSIS — T45.1X5A CHEMOTHERAPY INDUCED NEUTROPENIA: ICD-10-CM

## 2018-11-26 DIAGNOSIS — C09.9 MALIGNANT TUMOR OF PALATINE TONSIL: ICD-10-CM

## 2018-11-26 DIAGNOSIS — L08.9 SKIN INFECTION: Primary | ICD-10-CM

## 2018-11-26 DIAGNOSIS — C09.9 MALIGNANT TUMOR OF PALATINE TONSIL: Primary | ICD-10-CM

## 2018-11-26 DIAGNOSIS — D70.1 CHEMOTHERAPY INDUCED NEUTROPENIA: ICD-10-CM

## 2018-11-26 PROCEDURE — 96413 CHEMO IV INFUSION 1 HR: CPT

## 2018-11-26 PROCEDURE — 63600175 PHARM REV CODE 636 W HCPCS: Performed by: INTERNAL MEDICINE

## 2018-11-26 PROCEDURE — 99215 OFFICE O/P EST HI 40 MIN: CPT | Mod: S$GLB,,, | Performed by: INTERNAL MEDICINE

## 2018-11-26 PROCEDURE — 99999 PR PBB SHADOW E&M-EST. PATIENT-LVL IV: CPT | Mod: PBBFAC,,, | Performed by: INTERNAL MEDICINE

## 2018-11-26 PROCEDURE — C9463 INJECTION, APREPITANT: HCPCS | Mod: TB | Performed by: INTERNAL MEDICINE

## 2018-11-26 PROCEDURE — 96366 THER/PROPH/DIAG IV INF ADDON: CPT

## 2018-11-26 PROCEDURE — G6002 STEREOSCOPIC X-RAY GUIDANCE: HCPCS | Mod: 26,,, | Performed by: RADIOLOGY

## 2018-11-26 PROCEDURE — 25000003 PHARM REV CODE 250: Performed by: INTERNAL MEDICINE

## 2018-11-26 PROCEDURE — 77386 HC IMRT, COMPLEX: CPT | Performed by: RADIOLOGY

## 2018-11-26 PROCEDURE — 96367 TX/PROPH/DG ADDL SEQ IV INF: CPT

## 2018-11-26 PROCEDURE — 3008F BODY MASS INDEX DOCD: CPT | Mod: CPTII,S$GLB,, | Performed by: INTERNAL MEDICINE

## 2018-11-26 RX ORDER — SODIUM CHLORIDE 9 MG/ML
INJECTION, SOLUTION INTRAVENOUS CONTINUOUS
Status: CANCELLED | OUTPATIENT
Start: 2018-11-28

## 2018-11-26 RX ORDER — HEPARIN 100 UNIT/ML
500 SYRINGE INTRAVENOUS
Status: CANCELLED | OUTPATIENT
Start: 2018-11-29

## 2018-11-26 RX ORDER — SODIUM CHLORIDE 0.9 % (FLUSH) 0.9 %
10 SYRINGE (ML) INJECTION
Status: CANCELLED | OUTPATIENT
Start: 2018-11-27

## 2018-11-26 RX ORDER — HEPARIN 100 UNIT/ML
500 SYRINGE INTRAVENOUS
Status: CANCELLED | OUTPATIENT
Start: 2018-11-27

## 2018-11-26 RX ORDER — HEPARIN 100 UNIT/ML
500 SYRINGE INTRAVENOUS
Status: DISCONTINUED | OUTPATIENT
Start: 2018-11-26 | End: 2018-11-26 | Stop reason: HOSPADM

## 2018-11-26 RX ORDER — SODIUM CHLORIDE 0.9 % (FLUSH) 0.9 %
10 SYRINGE (ML) INJECTION
Status: CANCELLED | OUTPATIENT
Start: 2018-11-29

## 2018-11-26 RX ORDER — SODIUM CHLORIDE 0.9 % (FLUSH) 0.9 %
10 SYRINGE (ML) INJECTION
Status: DISCONTINUED | OUTPATIENT
Start: 2018-11-26 | End: 2018-11-26 | Stop reason: HOSPADM

## 2018-11-26 RX ORDER — HEPARIN 100 UNIT/ML
500 SYRINGE INTRAVENOUS
Status: CANCELLED | OUTPATIENT
Start: 2018-11-28

## 2018-11-26 RX ORDER — SODIUM CHLORIDE 9 MG/ML
INJECTION, SOLUTION INTRAVENOUS CONTINUOUS
Status: CANCELLED | OUTPATIENT
Start: 2018-11-29

## 2018-11-26 RX ORDER — SODIUM CHLORIDE 0.9 % (FLUSH) 0.9 %
10 SYRINGE (ML) INJECTION
Status: CANCELLED | OUTPATIENT
Start: 2018-11-28

## 2018-11-26 RX ADMIN — CISPLATIN 245 MG: 1 INJECTION INTRAVENOUS at 12:11

## 2018-11-26 RX ADMIN — APREPITANT 130 MG: 130 INJECTION, EMULSION INTRAVENOUS at 11:11

## 2018-11-26 RX ADMIN — DEXAMETHASONE SODIUM PHOSPHATE: 4 INJECTION, SOLUTION INTRA-ARTICULAR; INTRALESIONAL; INTRAMUSCULAR; INTRAVENOUS; SOFT TISSUE at 11:11

## 2018-11-26 RX ADMIN — MAGNESIUM SULFATE HEPTAHYDRATE: 500 INJECTION, SOLUTION INTRAMUSCULAR; INTRAVENOUS at 09:11

## 2018-11-26 RX ADMIN — MAGNESIUM SULFATE HEPTAHYDRATE: 500 INJECTION, SOLUTION INTRAMUSCULAR; INTRAVENOUS at 01:11

## 2018-11-26 NOTE — Clinical Note
Add day 2 IV fluids tomorrow. Keep Wednesday as isSchedule CBC on 11/28. Neupogen on 11/29, 11/30 Schedule weekly CBC,CMP, mag and phos Schedule CBC,CMP, Mag and phos and see me in 2 weeks

## 2018-11-26 NOTE — PLAN OF CARE
Problem: Patient Care Overview  Goal: Plan of Care Review  Outcome: Ongoing (interventions implemented as appropriate)  Pt tolerated Cisplatin well.  No s/s of reaction. Vitals stable, NAD.

## 2018-11-26 NOTE — PROGRESS NOTES
Subjective:       Patient ID: Frankie Hoyt is a 48 y.o. male.    Chief Complaint: Malignant tumor of palatine tonsil; Mouth Lesions; and decreased appetite  ONCOLOGIC HISTORY: Mr. Frankie Hoyt is a 48-year-old smoker and tobacco chewer who had waxing and waning neck mass for the last 18 months.  He underwent a CT scan in February 2017 that was read as negative.  He noticed that his left cheek swelling really got worse in July 2018.  He was seen in Urgent Care and received antibiotics without any improvement.  Then he saw Dr. Franks and underwent FNA of the left neck, which was positive for malignant cells.  She also noted left tonsillar asymmetry.  CT neck at this time showed a mass in the left neck at the level of the angle of the mandible, which represented an   enlarged lymph node measuring 3.9 x 2.4 x 5.2 cm, mass demonstrated irregular margins in keeping with extracapsular extension.  There was encasement of the external carotid artery and internal jugular vein with less than 180-degree abutment of the internal and distal common carotid artery.  There was prominence of the left palatine tonsil as well as fullness of the left vallecula.  He was seen by Dr. Mccray and underwent a PET scan, which showed a hypermetabolic left cervical mass and bilateral palatine tonsils.  There was a low level uptake in the borderline right-sided cervical lymph node and no evidence of distant   Metastasis         HPI He comes in for cycle 3 of chemo/RT with Cisplatin  He is currently doing 1 ensure, 1 shake and eggs. He is able to drink water.  Denies nausea, vomiting, diarrhea and chest pain, trouble breathing.      Review of Systems   Constitutional: Negative for appetite change, fatigue and unexpected weight change.   HENT: Positive for trouble swallowing. Negative for mouth sores.    Eyes: Negative for visual disturbance.   Respiratory: Negative for cough and shortness of breath.    Cardiovascular: Negative for chest pain.    Gastrointestinal: Negative for abdominal pain and diarrhea.   Genitourinary: Negative for frequency.   Musculoskeletal: Negative for back pain.   Skin: Negative for rash.   Neurological: Negative for headaches.   Hematological: Negative for adenopathy.   Psychiatric/Behavioral: The patient is not nervous/anxious.    All other systems reviewed and are negative.      Objective:      Physical Exam   Constitutional: He is oriented to person, place, and time. He appears well-developed and well-nourished.   HENT:   Mouth/Throat: Oropharyngeal exudate present.   Mucositis noted on soft palate and on sides of tongue   Cardiovascular: Normal rate and normal heart sounds.   Pulmonary/Chest: Effort normal and breath sounds normal. He has no wheezes.   Abdominal: Soft. Bowel sounds are normal. There is no tenderness.   Musculoskeletal: He exhibits no edema or tenderness.   Lymphadenopathy:     He has no cervical adenopathy.   Neurological: He is alert and oriented to person, place, and time. Coordination normal.   Skin: Skin is warm and dry. No rash noted.   Psychiatric: He has a normal mood and affect. Judgment and thought content normal.   Vitals reviewed.        LABS:  WBC   Date Value Ref Range Status   11/26/2018 2.15 (L) 3.90 - 12.70 K/uL Final     Hemoglobin   Date Value Ref Range Status   11/26/2018 12.4 (L) 14.0 - 18.0 g/dL Final     Hematocrit   Date Value Ref Range Status   11/26/2018 35.7 (L) 40.0 - 54.0 % Final     Platelets   Date Value Ref Range Status   11/26/2018 302 150 - 350 K/uL Final     Gran # (ANC)   Date Value Ref Range Status   11/26/2018 1.1 (L) 1.8 - 7.7 K/uL Final     Comment:     The ANC is based on a white cell differential from an   automated cell counter. It has not been microscopically   reviewed for the presence of abnormal cells. Clinical   correlation is required.         Chemistry        Component Value Date/Time     11/26/2018 0716    K 4.6 11/26/2018 0716     11/26/2018 0716     CO2 28 11/26/2018 0716    BUN 20 11/26/2018 0716    CREATININE 1.0 11/26/2018 0716    GLU 99 11/26/2018 0716        Component Value Date/Time    CALCIUM 9.8 11/26/2018 0716    ALKPHOS 67 11/26/2018 0716    AST 28 11/26/2018 0716    ALT 33 11/26/2018 0716    BILITOT 0.7 11/26/2018 0716    ESTGFRAFRICA >60.0 11/26/2018 0716    EGFRNONAA >60.0 11/26/2018 0716          Assessment:       1. 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 Chemotherapy follow-up examination    4. Chemotherapy induced neutropenia        Plan:        1,2,3,4. He will proceed with cycle 3 of Cisplatin with concurrent RT. He will complete RT on 11/28/18. Neupogen X 2 days based on labs later this week  Labs weekly. He will return in 2 weeks for a visit. CT neck in 4 weeks and PET scan in 12 weeks    Above care plan was discussed with patient and all questions were addressed to his satisfaction

## 2018-11-27 ENCOUNTER — INFUSION (OUTPATIENT)
Dept: INFUSION THERAPY | Facility: HOSPITAL | Age: 48
End: 2018-11-27
Attending: INTERNAL MEDICINE
Payer: COMMERCIAL

## 2018-11-27 ENCOUNTER — DOCUMENTATION ONLY (OUTPATIENT)
Dept: RADIATION ONCOLOGY | Facility: CLINIC | Age: 48
End: 2018-11-27

## 2018-11-27 VITALS
HEART RATE: 73 BPM | SYSTOLIC BLOOD PRESSURE: 138 MMHG | DIASTOLIC BLOOD PRESSURE: 73 MMHG | RESPIRATION RATE: 18 BRPM | TEMPERATURE: 97 F

## 2018-11-27 DIAGNOSIS — L08.9 SKIN INFECTION: Primary | ICD-10-CM

## 2018-11-27 DIAGNOSIS — C09.9 TONSIL CANCER: ICD-10-CM

## 2018-11-27 DIAGNOSIS — Z00.6 EXAMINATION OF PARTICIPANT IN CLINICAL TRIAL: ICD-10-CM

## 2018-11-27 DIAGNOSIS — C09.9 MALIGNANT TUMOR OF PALATINE TONSIL: ICD-10-CM

## 2018-11-27 DIAGNOSIS — C09.9 MALIGNANT TUMOR OF PALATINE TONSIL: Primary | ICD-10-CM

## 2018-11-27 PROCEDURE — 25000003 PHARM REV CODE 250: Performed by: INTERNAL MEDICINE

## 2018-11-27 PROCEDURE — 96360 HYDRATION IV INFUSION INIT: CPT

## 2018-11-27 PROCEDURE — G6002 STEREOSCOPIC X-RAY GUIDANCE: HCPCS | Mod: 26,,, | Performed by: RADIOLOGY

## 2018-11-27 PROCEDURE — 77386 HC IMRT, COMPLEX: CPT | Performed by: RADIOLOGY

## 2018-11-27 RX ORDER — SODIUM CHLORIDE 9 MG/ML
INJECTION, SOLUTION INTRAVENOUS CONTINUOUS
Status: DISCONTINUED | OUTPATIENT
Start: 2018-11-27 | End: 2018-11-27 | Stop reason: HOSPADM

## 2018-11-27 RX ADMIN — SODIUM CHLORIDE: 0.9 INJECTION, SOLUTION INTRAVENOUS at 04:11

## 2018-11-27 NOTE — PROGRESS NOTES
Nov 26, 2018     Protocol: Phase 2, multi-center, randomized, double-blind, placebo controlled study to assess the safety and efficacy of topically applied  for the attenuation of oral mucositis in subjects with cancers of the head and neck receiving concomitant chemoradion therapy.    Protocol # -[id number]  IRB # 2017.177.B  PI: Nat Castillo MD  Version Date: 26-Mar-2018     Week 7 Visit 1:      Patient presented to [facility] clinic today for the above mentioned treatment.  Patient alert and oriented x 3. Mood and affect are appropriate to the situation. Patient denies any fever, pain or shortness of breath. Denies any vomiting, diarrhea or constipation at this time. Pt denies any mouth sores but states he is still having problems with dry mouth and trouble swallowing. Reviewed and confirmed which concomitant medications with the patient he is currently taking. See concomitant therapy worksheet.  Lab work resulted and reviewed by Dr. Castillo.  Lab work out of range = not clinically significant per Dr. Castillo.  The patient verbalized understanding of this information and are in agreement of this treatment plan.      OM assessment completed. See oral assessment worksheet.  Patient states he has been compliant with his caries kit and daily diary.    Pt did not bring oral drug for drug exchange with him today so will do this on 11/27/18.     On 11/27/18  Kit numbers 1230 and 6190 dispensed to pt.     Kit numbers 1229 and 6066 both returned with 10 unused bottles and 14 used bottles in each kit.       Talked to the patient about the importance of being compliant with his study medication. He states he has missed doses due to falling asleep before taking medication and forgetting to take the medication to work with him.     Pt to return on 11/28/18 for week 7 visit 2.     For most up to date Adverse Event log please see Research shadow paper chart.         Review of Adverse Events:  Fatigue.  Grade 1. AE Ongoing. Will  monitor.  Nausea.  Grade 1.  AE Ongoing.  Will monitor.  Xerostomia.  Grade 2.  AE Ongoing.  Will monitor.  Dysphagia. Grade 2.  AE ongoing.  Will monitor.      Baseline Medical History:    Erectile dysfunction. Grade 2.  Pt takes Viagra  Anxiety.  Grade 1   Diverticulitis.  History of .   Grade 1   Left neck pain.  Grade 1  Throat pain.  Grade 1  Right tonsillectomy

## 2018-11-27 NOTE — PLAN OF CARE
Problem: Patient Care Overview  Goal: Plan of Care Review  Outcome: Ongoing (interventions implemented as appropriate)  1730-Patient tolerated treatment well. Discharged without complaints or S/S of adverse event.  Instructed to call provider for any questions or concerns. Patient will return tomorrow for additional iv fluids.

## 2018-11-27 NOTE — PLAN OF CARE
Problem: Patient Care Overview  Goal: Individualization & Mutuality  Outcome: Ongoing (interventions implemented as appropriate)  1615-Labs , hx, and medications reviewed, patient is here for d2 fluids. Assessment completed. Discussed plan of care with patient. Patient in agreement. Chair reclined and warm blanket and snack offered.

## 2018-11-28 ENCOUNTER — INFUSION (OUTPATIENT)
Dept: INFUSION THERAPY | Facility: HOSPITAL | Age: 48
End: 2018-11-28
Attending: INTERNAL MEDICINE
Payer: COMMERCIAL

## 2018-11-28 VITALS
DIASTOLIC BLOOD PRESSURE: 69 MMHG | SYSTOLIC BLOOD PRESSURE: 117 MMHG | RESPIRATION RATE: 18 BRPM | HEART RATE: 65 BPM | TEMPERATURE: 98 F

## 2018-11-28 DIAGNOSIS — C09.9 MALIGNANT TUMOR OF PALATINE TONSIL: ICD-10-CM

## 2018-11-28 DIAGNOSIS — L08.9 SKIN INFECTION: Primary | ICD-10-CM

## 2018-11-28 PROCEDURE — 25000003 PHARM REV CODE 250: Performed by: INTERNAL MEDICINE

## 2018-11-28 PROCEDURE — 77386 HC IMRT, COMPLEX: CPT | Performed by: RADIOLOGY

## 2018-11-28 PROCEDURE — 96360 HYDRATION IV INFUSION INIT: CPT

## 2018-11-28 PROCEDURE — 77417 THER RADIOLOGY PORT IMAGE(S): CPT | Performed by: RADIOLOGY

## 2018-11-28 PROCEDURE — G6002 STEREOSCOPIC X-RAY GUIDANCE: HCPCS | Mod: 26,,, | Performed by: RADIOLOGY

## 2018-11-28 RX ORDER — SODIUM CHLORIDE 9 MG/ML
INJECTION, SOLUTION INTRAVENOUS CONTINUOUS
Status: ACTIVE | OUTPATIENT
Start: 2018-11-28 | End: 2018-11-28

## 2018-11-28 RX ADMIN — SODIUM CHLORIDE: 0.9 INJECTION, SOLUTION INTRAVENOUS at 11:11

## 2018-11-28 NOTE — PLAN OF CARE
Problem: Patient Care Overview  Goal: Plan of Care Review  Outcome: Ongoing (interventions implemented as appropriate)  Tolerated IVF's without difficulty. NO complaints voiced. AVS given to pt. Instructed to notify MD with any concerns or problems.  Pt verbalized understanding. Strongly encouraged to drink plenty of fluids.

## 2018-11-28 NOTE — PLAN OF CARE
Problem: Patient Care Overview  Goal: Plan of Care Review  Outcome: Ongoing (interventions implemented as appropriate)  Day 35 of radaition to the h/n wgt down c/o dysphagia

## 2018-11-29 VITALS
HEART RATE: 84 BPM | RESPIRATION RATE: 16 BRPM | DIASTOLIC BLOOD PRESSURE: 70 MMHG | SYSTOLIC BLOOD PRESSURE: 126 MMHG | BODY MASS INDEX: 31.46 KG/M2 | WEIGHT: 232 LBS | TEMPERATURE: 98 F

## 2018-11-29 PROCEDURE — 77336 RADIATION PHYSICS CONSULT: CPT | Performed by: RADIOLOGY

## 2018-11-29 PROCEDURE — 77386 HC IMRT, COMPLEX: CPT | Performed by: RADIOLOGY

## 2018-11-29 PROCEDURE — G6002 STEREOSCOPIC X-RAY GUIDANCE: HCPCS | Mod: 26,,, | Performed by: RADIOLOGY

## 2018-11-30 ENCOUNTER — RESEARCH ENCOUNTER (OUTPATIENT)
Dept: RESEARCH | Facility: HOSPITAL | Age: 48
End: 2018-11-30

## 2018-11-30 PROCEDURE — 77386 HC IMRT, COMPLEX: CPT | Performed by: RADIOLOGY

## 2018-11-30 PROCEDURE — G6002 STEREOSCOPIC X-RAY GUIDANCE: HCPCS | Mod: 26,,, | Performed by: RADIOLOGY

## 2018-11-30 NOTE — PROGRESS NOTES
Nov 30, 2018     Protocol: Phase 2, multi-center, randomized, double-blind, placebo controlled study to assess the safety and efficacy of topically applied  for the attenuation of oral mucositis in subjects with cancers of the head and neck receiving concomitant chemoradion therapy.    Protocol # -[id number]  IRB # 2017.177.B  PI: Nat Castillo MD  Version Date: 26-Mar-2018     Week 7 Visit 2:     The following assessments and procedures were conducted:     Recorded AEs and SAEs.    Recorded analgesia use for oral pain.  Pt states he is taking tylenol occasionally for throat pain.  Patient does complain of dry mouth and some difficulty swallowing.  This is not affecting his ability to eat.  Pt still does not have the ability to taste food.   Recorded any concomitant medications.  Patient reports no new use of any mediations.   Recorded use and/or placement of gastrostomy tube.  Pt does not currently have a PEG.  Reviewed daily diary with patient.  Pt states he has been compliant with his daily diary.   Reviewed with the patient the analgesic information written in the diary and ensured complete analgesic information.  The importance of completing the diary reviewed with patient.  The OMDQ was completed prior to the oral assessment     Assessment of compliance with caries prevention regimen was done.  Pt states he has been compliant.  Assessment compliance with IMP dosing was done.  Pt states he has been compliant.  OM assessment and recorded and sent to study.  See assessment form.      For most up to date Adverse Event log please see Research shadow paper chart.      Review of Adverse Events:  Fatigue.  Grade 1. AE Ongoing. Will monitor  Dysphagia.  Grade 1. AE Ongoing.  Will Monitor  Dry Mouth.  Grade 1.  AE ongoing.  Will monitor.   Nausea.  Intermittent.  Grade 1.  AE Ongoing.  Will monitor.         Baseline Medical History:     Erectile dysfunction. Grade 2.  Pt takes Viagra  Anxiety.  Grade 1    Diverticulitis.  History of . Grade 1   Left neck pain.  Grade 1  Throat pain.  Grade 1  Right tonsillectomy      Patient to return 12/04/18 for post radiation week 1. Investigational drug dispensed and daily diary exchanged at that visit.

## 2018-12-03 ENCOUNTER — HOSPITAL ENCOUNTER (OUTPATIENT)
Dept: RADIATION THERAPY | Facility: HOSPITAL | Age: 48
Discharge: HOME | End: 2018-12-03
Attending: RADIOLOGY
Payer: COMMERCIAL

## 2018-12-03 DIAGNOSIS — C09.9 TONSIL CANCER: ICD-10-CM

## 2018-12-03 DIAGNOSIS — Z00.6 EXAMINATION OF PARTICIPANT IN CLINICAL TRIAL: ICD-10-CM

## 2018-12-03 DIAGNOSIS — C09.9 MALIGNANT TUMOR OF PALATINE TONSIL: Primary | ICD-10-CM

## 2018-12-03 PROCEDURE — 77386 HC IMRT, COMPLEX: CPT | Performed by: RADIOLOGY

## 2018-12-03 PROCEDURE — G6002 STEREOSCOPIC X-RAY GUIDANCE: HCPCS | Mod: 26,,, | Performed by: RADIOLOGY

## 2018-12-04 ENCOUNTER — TELEPHONE (OUTPATIENT)
Dept: HEMATOLOGY/ONCOLOGY | Facility: CLINIC | Age: 48
End: 2018-12-04

## 2018-12-04 ENCOUNTER — LAB VISIT (OUTPATIENT)
Dept: LAB | Facility: HOSPITAL | Age: 48
End: 2018-12-04
Attending: INTERNAL MEDICINE
Payer: COMMERCIAL

## 2018-12-04 ENCOUNTER — RESEARCH ENCOUNTER (OUTPATIENT)
Dept: RESEARCH | Facility: HOSPITAL | Age: 48
End: 2018-12-04

## 2018-12-04 ENCOUNTER — DOCUMENTATION ONLY (OUTPATIENT)
Dept: RADIATION ONCOLOGY | Facility: CLINIC | Age: 48
End: 2018-12-04

## 2018-12-04 ENCOUNTER — PATIENT MESSAGE (OUTPATIENT)
Dept: HEMATOLOGY/ONCOLOGY | Facility: CLINIC | Age: 48
End: 2018-12-04

## 2018-12-04 ENCOUNTER — INFUSION (OUTPATIENT)
Dept: INFUSION THERAPY | Facility: HOSPITAL | Age: 48
End: 2018-12-04
Attending: INTERNAL MEDICINE
Payer: COMMERCIAL

## 2018-12-04 VITALS — HEART RATE: 84 BPM | SYSTOLIC BLOOD PRESSURE: 122 MMHG | RESPIRATION RATE: 18 BRPM | DIASTOLIC BLOOD PRESSURE: 86 MMHG

## 2018-12-04 VITALS
BODY MASS INDEX: 29.93 KG/M2 | SYSTOLIC BLOOD PRESSURE: 123 MMHG | WEIGHT: 220.69 LBS | DIASTOLIC BLOOD PRESSURE: 67 MMHG | RESPIRATION RATE: 16 BRPM | TEMPERATURE: 98 F | HEART RATE: 72 BPM

## 2018-12-04 DIAGNOSIS — Z00.6 EXAMINATION OF PARTICIPANT IN CLINICAL TRIAL: ICD-10-CM

## 2018-12-04 DIAGNOSIS — C09.9 TONSIL CANCER: ICD-10-CM

## 2018-12-04 DIAGNOSIS — E86.0 DEHYDRATION: Primary | ICD-10-CM

## 2018-12-04 DIAGNOSIS — C09.9 MALIGNANT TUMOR OF PALATINE TONSIL: ICD-10-CM

## 2018-12-04 LAB
ALBUMIN SERPL BCP-MCNC: 3.4 G/DL
ALP SERPL-CCNC: 80 U/L
ALT SERPL W/O P-5'-P-CCNC: 123 U/L
ANION GAP SERPL CALC-SCNC: 11 MMOL/L
AST SERPL-CCNC: 54 U/L
BILIRUB SERPL-MCNC: 0.6 MG/DL
BUN SERPL-MCNC: 32 MG/DL
CALCIUM SERPL-MCNC: 9.7 MG/DL
CHLORIDE SERPL-SCNC: 95 MMOL/L
CO2 SERPL-SCNC: 32 MMOL/L
CREAT SERPL-MCNC: 1.7 MG/DL
DRUG STUDY SPECIMEN TYPE: NORMAL
DRUG STUDY TEST NAME: NORMAL
DRUG STUDY TEST RESULT: NORMAL
ERYTHROCYTE [DISTWIDTH] IN BLOOD BY AUTOMATED COUNT: 14 %
EST. GFR  (AFRICAN AMERICAN): 53.2 ML/MIN/1.73 M^2
EST. GFR  (NON AFRICAN AMERICAN): 46 ML/MIN/1.73 M^2
GLUCOSE SERPL-MCNC: 145 MG/DL
HCT VFR BLD AUTO: 36.4 %
HGB BLD-MCNC: 13 G/DL
IMM GRANULOCYTES # BLD AUTO: 0.23 K/UL
LDH SERPL L TO P-CCNC: 182 U/L
MAGNESIUM SERPL-MCNC: 1.7 MG/DL
MCH RBC QN AUTO: 30.6 PG
MCHC RBC AUTO-ENTMCNC: 35.7 G/DL
MCV RBC AUTO: 86 FL
NEUTROPHILS # BLD AUTO: 3.1 K/UL
PHOSPHATE SERPL-MCNC: 3.4 MG/DL
PLATELET # BLD AUTO: 412 K/UL
PMV BLD AUTO: 9 FL
POTASSIUM SERPL-SCNC: 4.7 MMOL/L
PROT SERPL-MCNC: 7.2 G/DL
RBC # BLD AUTO: 4.25 M/UL
SODIUM SERPL-SCNC: 138 MMOL/L
WBC # BLD AUTO: 4.57 K/UL

## 2018-12-04 PROCEDURE — 84100 ASSAY OF PHOSPHORUS: CPT

## 2018-12-04 PROCEDURE — 99000 SPECIMEN HANDLING OFFICE-LAB: CPT

## 2018-12-04 PROCEDURE — 83735 ASSAY OF MAGNESIUM: CPT

## 2018-12-04 PROCEDURE — G6002 STEREOSCOPIC X-RAY GUIDANCE: HCPCS | Mod: 26,,, | Performed by: RADIOLOGY

## 2018-12-04 PROCEDURE — 85027 COMPLETE CBC AUTOMATED: CPT

## 2018-12-04 PROCEDURE — 96360 HYDRATION IV INFUSION INIT: CPT

## 2018-12-04 PROCEDURE — 77386 HC IMRT, COMPLEX: CPT | Performed by: RADIOLOGY

## 2018-12-04 PROCEDURE — 83615 LACTATE (LD) (LDH) ENZYME: CPT

## 2018-12-04 PROCEDURE — 25000003 PHARM REV CODE 250: Performed by: INTERNAL MEDICINE

## 2018-12-04 PROCEDURE — 36415 COLL VENOUS BLD VENIPUNCTURE: CPT

## 2018-12-04 PROCEDURE — 96361 HYDRATE IV INFUSION ADD-ON: CPT

## 2018-12-04 PROCEDURE — 80053 COMPREHEN METABOLIC PANEL: CPT

## 2018-12-04 RX ORDER — SODIUM CHLORIDE 9 MG/ML
INJECTION, SOLUTION INTRAVENOUS CONTINUOUS
Status: DISCONTINUED | OUTPATIENT
Start: 2018-12-04 | End: 2018-12-04 | Stop reason: HOSPADM

## 2018-12-04 RX ADMIN — SODIUM CHLORIDE: 0.9 INJECTION, SOLUTION INTRAVENOUS at 04:12

## 2018-12-04 NOTE — TELEPHONE ENCOUNTER
Spoke with patient.  Scheduled him for 2 liters NS today, repeat CMP tomorrow.   Patient is upset he has to come to clinic---he is very frustrated---  But he is agreeable to coming to clinic.

## 2018-12-04 NOTE — PROGRESS NOTES
Dec 04, 2018     Protocol: Phase 2, multi-center, randomized, double-blind, placebo controlled study to assess the safety and efficacy of topically applied  for the attenuation of oral mucositis in subjects with cancers of the head and neck receiving concomitant chemoradion therapy.    Protocol # -[id number]  IRB # 2017.177.B  PI: Nat Castillo MD  Version Date: 26-Mar-2018     Week Post RT week 1:      Patient presented to [facility] clinic today for the above mentioned treatment.  Patient alert and oriented x 3. Mood and affect are appropriate to the situation. Patient denies any fever, pain or shortness of breath. Denies any vomiting, diarrhea or constipation at this time. Pt states he is still having problems with dry mouth, nausea and trouble swallowing. Reviewed and confirmed which concomitant medications with the patient he is currently taking. See concomitant therapy worksheet.  Lab work resulted and reviewed by Dr. Castillo.  Lab work out of range = not clinically significant per Dr. Castillo.  The patient verbalized understanding of this information and are in agreement of this treatment plan.      OM assessment completed. See oral assessment worksheet.  Patient states he has been compliant with his caries kit and daily diary.  Again reinforced with patient the importance of not missing doses of medication.     Kit numbers [id number] and [id number] dispensed to pt.     Kit number 1230 returned with 9 unused and 15 used bottles  Kit number 6137 returned with 8 unused bottles and 16 used bottles.     Talked to pt regarding the discrepancy in number of used bottles and he does not have an explanation for this.     Pt to return on 12/06/18 for week 1 post RT visit 2.  OM assessment only at this visit.     For most up to date Adverse Event log please see Research shadow paper chart.         Review of Adverse Events:  Fatigue.  Grade 1. AE Ongoing. Will monitor.  Nausea.  Grade 1.  AE Ongoing.  Will monitor.  Xerostomia.  Grade  2.  AE Ongoing.  Will monitor.  Dysphagia. Grade 2.  AE ongoing.  Will monitor.      Baseline Medical History:    Erectile dysfunction. Grade 2.  Pt takes Viagra  Anxiety.  Grade 1   Diverticulitis.  History of .   Grade 1   Left neck pain.  Grade 1  Throat pain.  Grade 1  Right tonsillectomy

## 2018-12-05 ENCOUNTER — INFUSION (OUTPATIENT)
Dept: INFUSION THERAPY | Facility: HOSPITAL | Age: 48
End: 2018-12-05
Attending: INTERNAL MEDICINE
Payer: COMMERCIAL

## 2018-12-05 VITALS
RESPIRATION RATE: 18 BRPM | BODY MASS INDEX: 29.8 KG/M2 | HEIGHT: 72 IN | SYSTOLIC BLOOD PRESSURE: 130 MMHG | TEMPERATURE: 98 F | HEART RATE: 78 BPM | WEIGHT: 220 LBS | DIASTOLIC BLOOD PRESSURE: 74 MMHG

## 2018-12-05 DIAGNOSIS — C09.9 TONSIL CANCER: Primary | ICD-10-CM

## 2018-12-05 PROCEDURE — 25000003 PHARM REV CODE 250: Performed by: INTERNAL MEDICINE

## 2018-12-05 PROCEDURE — 96361 HYDRATE IV INFUSION ADD-ON: CPT

## 2018-12-05 PROCEDURE — 96360 HYDRATION IV INFUSION INIT: CPT

## 2018-12-05 RX ADMIN — SODIUM CHLORIDE 2000 ML: 0.9 INJECTION, SOLUTION INTRAVENOUS at 09:12

## 2018-12-05 NOTE — PLAN OF CARE
Problem: Patient Care Overview  Goal: Plan of Care Review  Outcome: Ongoing (interventions implemented as appropriate)  Pt received 2 IVFs;tolerated well. VSS and NAD. Pt instructed to call MD with any concerns. Pt discharged home independently.

## 2018-12-05 NOTE — PLAN OF CARE
Problem: Chemotherapy Effects (Adult)  Goal: Signs and Symptoms of Listed Potential Problems Will be Absent, Minimized or Managed (Chemotherapy Effects)  Signs and symptoms of listed potential problems will be absent, minimized or managed by discharge/transition of care (reference Chemotherapy Effects (Adult) CPG).  Outcome: Ongoing (interventions implemented as appropriate)  Pt here for IVF, labs, hx, meds, allergies reviewed, pt reclined in chair, continue to monitor

## 2018-12-06 ENCOUNTER — TELEPHONE (OUTPATIENT)
Dept: HEMATOLOGY/ONCOLOGY | Facility: CLINIC | Age: 48
End: 2018-12-06

## 2018-12-06 ENCOUNTER — RESEARCH ENCOUNTER (OUTPATIENT)
Dept: RESEARCH | Facility: HOSPITAL | Age: 48
End: 2018-12-06

## 2018-12-06 NOTE — TELEPHONE ENCOUNTER
----- Message from Nat Castillo MD sent at 12/6/2018  3:09 PM CST -----  Creatinine is better. Please have him drink plenty of fluids

## 2018-12-06 NOTE — PLAN OF CARE
Problem: Patient Care Overview  Goal: Plan of Care Review  Outcome: Outcome(s) achieved Date Met: 12/06/18  Radiation to the h/n completed on 12/4/18  F/u appt made

## 2018-12-06 NOTE — PROGRESS NOTES
December 6, 2018     Protocol: Phase 2, multi-center, randomized, double-blind, placebo controlled study to assess the safety and efficacy of topically applied  for the attenuation of oral mucositis in subjects with cancers of the head and neck receiving concomitant chemoradion therapy.    Protocol # -[id number]  IRB # 2017.177.B  PI: Nat Castillo MD  Version Date: 26-Mar-2018     Post-RT Week 1 Visit 2:     The following assessments and procedures were conducted:     Recorded AEs and SAEs.    Recorded analgesia use for oral pain.  Pt states he is taking tylenol occasionally for throat pain.  Patient does complain of dry mouth and some difficulty swallowing.  Pt still does not have the ability to taste food. This does not affect what he is eating, however he does say that it is a miserable experience because of his dry mouth and lack of taste. He has noticed an increase in his sensitivity to smells.    Recorded any concomitant medications.  Patient reports no new use of any mediations.   Recorded use and/or placement of gastrostomy tube.  Pt does not currently have a PEG.  Reviewed daily diary with patient.  Pt states he has been compliant with his daily diary.   Reviewed with the patient the analgesic information written in the diary and ensured complete analgesic information.  The importance of completing the diary reviewed with patient.  The OMDQ was completed prior to the oral assessment.     Assessment of compliance with caries prevention regimen was done.  Pt states he has been compliant.  Assessment compliance with IMP dosing was done.  Pt states he has been compliant.  OM assessment and recorded and sent to study.  See assessment form.      For most up to date Adverse Event log please see Research shadow paper chart.      Review of Adverse Events:  Fatigue.  Grade 1. AE Ongoing. Patient states that he knows he is starting to feel better because he is ready to get back to the gym.  Dysphagia.  Grade 1.  AE Ongoing.  Will Monitor  Dry Mouth.  Grade 1.  AE ongoing. Patient is drinking water frequently to help with this.  Nausea.  Intermittent.  Grade 1.  AE Ongoing.  Will monitor.   Dysgeusia. Grade 1.  AE ongoing. Will continue to monitor. Pt still does not have the ability to taste food.    Baseline Medical History:     Erectile dysfunction. Grade 2.  Pt takes Viagra  Anxiety.  Grade 1   Diverticulitis.  History of . Grade 1   Left neck pain.  Grade 1  Throat pain.  Grade 1  Right tonsillectomy      Patient to return 12/10/18 for labs and visit with Dr. Castillo. Investigational drug will be dispensed and daily diary exchanged at that visit.

## 2018-12-06 NOTE — TELEPHONE ENCOUNTER
Informed patient his labs looks good.  Encouraged him to push plenty fluids.  He understands---and will be at his clinic appt for labs and MD on Monday the 10th.

## 2018-12-07 PROCEDURE — 77336 RADIATION PHYSICS CONSULT: CPT | Performed by: RADIOLOGY

## 2018-12-10 ENCOUNTER — RESEARCH ENCOUNTER (OUTPATIENT)
Dept: RESEARCH | Facility: HOSPITAL | Age: 48
End: 2018-12-10

## 2018-12-10 ENCOUNTER — INFUSION (OUTPATIENT)
Dept: INFUSION THERAPY | Facility: HOSPITAL | Age: 48
End: 2018-12-10
Attending: INTERNAL MEDICINE
Payer: COMMERCIAL

## 2018-12-10 ENCOUNTER — OFFICE VISIT (OUTPATIENT)
Dept: HEMATOLOGY/ONCOLOGY | Facility: CLINIC | Age: 48
End: 2018-12-10
Payer: COMMERCIAL

## 2018-12-10 ENCOUNTER — TELEPHONE (OUTPATIENT)
Dept: HEMATOLOGY/ONCOLOGY | Facility: CLINIC | Age: 48
End: 2018-12-10

## 2018-12-10 VITALS — SYSTOLIC BLOOD PRESSURE: 129 MMHG | DIASTOLIC BLOOD PRESSURE: 75 MMHG | HEART RATE: 77 BPM | RESPIRATION RATE: 18 BRPM

## 2018-12-10 VITALS
TEMPERATURE: 98 F | BODY MASS INDEX: 29.86 KG/M2 | RESPIRATION RATE: 20 BRPM | HEART RATE: 90 BPM | WEIGHT: 220.44 LBS | OXYGEN SATURATION: 98 % | SYSTOLIC BLOOD PRESSURE: 118 MMHG | HEIGHT: 72 IN | DIASTOLIC BLOOD PRESSURE: 73 MMHG

## 2018-12-10 DIAGNOSIS — N17.9 ACUTE RENAL FAILURE, UNSPECIFIED ACUTE RENAL FAILURE TYPE: Primary | ICD-10-CM

## 2018-12-10 DIAGNOSIS — C09.9 MALIGNANT TUMOR OF PALATINE TONSIL: Primary | ICD-10-CM

## 2018-12-10 DIAGNOSIS — N17.9 ACUTE RENAL FAILURE, UNSPECIFIED ACUTE RENAL FAILURE TYPE: ICD-10-CM

## 2018-12-10 DIAGNOSIS — C77.0 SECONDARY MALIGNANCY OF CERVICOFACIAL LYMPH NODE: ICD-10-CM

## 2018-12-10 PROCEDURE — 96361 HYDRATE IV INFUSION ADD-ON: CPT

## 2018-12-10 PROCEDURE — 96360 HYDRATION IV INFUSION INIT: CPT

## 2018-12-10 PROCEDURE — 99999 PR PBB SHADOW E&M-EST. PATIENT-LVL IV: CPT | Mod: PBBFAC,,, | Performed by: INTERNAL MEDICINE

## 2018-12-10 PROCEDURE — 3008F BODY MASS INDEX DOCD: CPT | Mod: CPTII,S$GLB,, | Performed by: INTERNAL MEDICINE

## 2018-12-10 PROCEDURE — 99215 OFFICE O/P EST HI 40 MIN: CPT | Mod: S$GLB,,, | Performed by: INTERNAL MEDICINE

## 2018-12-10 PROCEDURE — 25000003 PHARM REV CODE 250: Performed by: INTERNAL MEDICINE

## 2018-12-10 RX ORDER — SODIUM CHLORIDE 0.9 % (FLUSH) 0.9 %
10 SYRINGE (ML) INJECTION
Status: CANCELLED | OUTPATIENT
Start: 2018-12-10

## 2018-12-10 RX ORDER — HEPARIN 100 UNIT/ML
500 SYRINGE INTRAVENOUS
Status: DISCONTINUED | OUTPATIENT
Start: 2018-12-10 | End: 2018-12-10 | Stop reason: HOSPADM

## 2018-12-10 RX ORDER — HEPARIN 100 UNIT/ML
500 SYRINGE INTRAVENOUS
Status: CANCELLED | OUTPATIENT
Start: 2018-12-10

## 2018-12-10 RX ORDER — SODIUM CHLORIDE 0.9 % (FLUSH) 0.9 %
10 SYRINGE (ML) INJECTION
Status: DISCONTINUED | OUTPATIENT
Start: 2018-12-10 | End: 2018-12-10 | Stop reason: HOSPADM

## 2018-12-10 RX ADMIN — SODIUM CHLORIDE 2000 ML: 0.9 INJECTION, SOLUTION INTRAVENOUS at 12:12

## 2018-12-10 NOTE — TELEPHONE ENCOUNTER
----- Message from Nat Castillo MD sent at 12/10/2018 11:34 AM CST -----  Schedule CBC,CMp, CT neck, chest and see me in the week of 1/7/19  Also schedule with Dr. cMcray that week

## 2018-12-10 NOTE — PLAN OF CARE
Problem: Patient Care Overview  Goal: Plan of Care Review  Outcome: Ongoing (interventions implemented as appropriate)  Pt tolerated 2L NS without complications. VSS.  Instructed to contact MD with any questions. PIV removed and AVS given to patient.

## 2018-12-10 NOTE — PROGRESS NOTES
Subjective:       Patient ID: Frankie Hoyt is a 48 y.o. male.    Chief Complaint: Malignant tumor of palatine tonsil  ONCOLOGIC HISTORY: Mr. Frankie Hoyt is a 48-year-old smoker and tobacco chewer who had waxing and waning neck mass for the last 18 months.  He underwent a CT scan in February 2017 that was read as negative.  He noticed that his left cheek swelling really got worse in July 2018.  He was seen in Urgent Care and received antibiotics without any improvement.  Then he saw Dr. Franks and underwent FNA of the left neck, which was positive for malignant cells.  She also noted left tonsillar asymmetry.  CT neck at this time showed a mass in the left neck at the level of the angle of the mandible, which represented an   enlarged lymph node measuring 3.9 x 2.4 x 5.2 cm, mass demonstrated irregular margins in keeping with extracapsular extension.  There was encasement of the external carotid artery and internal jugular vein with less than 180-degree abutment of the internal and distal common carotid artery.  There was prominence of the left palatine tonsil as well as fullness of the left vallecula.  He was seen by Dr. Mccray and underwent a PET scan, which showed a hypermetabolic left cervical mass and bilateral palatine tonsils.  There was a low level uptake in the borderline right-sided cervical lymph node and no evidence of distant   Metastasis        HPI He completed cycle 3 of chemo/RT with Cisplatin as of 12/4/18  He feels his swallowing is better. He is able to drink liquids like water, ensure, Able to eat eggs,  He has no taste but notes that he could have eaten better if his taste was better. His KPS is 80    Review of Systems   Constitutional: Positive for appetite change and unexpected weight change. Negative for fatigue.   HENT: Positive for sore throat. Negative for mouth sores.    Eyes: Negative for visual disturbance.   Respiratory: Negative for cough and shortness of breath.    Cardiovascular:  Negative for chest pain.   Gastrointestinal: Negative for abdominal pain and diarrhea.   Genitourinary: Negative for frequency.   Musculoskeletal: Negative for back pain.   Skin: Negative for rash.   Neurological: Negative for headaches.   Hematological: Negative for adenopathy.   Psychiatric/Behavioral: The patient is not nervous/anxious.    All other systems reviewed and are negative.      Objective:      Physical Exam   Constitutional: He is oriented to person, place, and time. He appears well-developed and well-nourished.   HENT:   Mouth/Throat: No oropharyngeal exudate.   Cardiovascular: Normal rate and normal heart sounds.   Pulmonary/Chest: Effort normal and breath sounds normal. He has no wheezes.   Abdominal: Soft. Bowel sounds are normal. There is no tenderness.   Musculoskeletal: He exhibits no edema or tenderness.   Lymphadenopathy:     He has no cervical adenopathy.   Neurological: He is alert and oriented to person, place, and time. Coordination normal.   Skin: Skin is warm and dry. No rash noted.   Psychiatric: He has a normal mood and affect. Judgment and thought content normal.   Vitals reviewed.      LABS:  WBC   Date Value Ref Range Status   12/10/2018 6.22 3.90 - 12.70 K/uL Final     Hemoglobin   Date Value Ref Range Status   12/10/2018 11.8 (L) 14.0 - 18.0 g/dL Final     Hematocrit   Date Value Ref Range Status   12/10/2018 32.4 (L) 40.0 - 54.0 % Final     Platelets   Date Value Ref Range Status   12/10/2018 205 150 - 350 K/uL Final     Gran # (ANC)   Date Value Ref Range Status   12/10/2018 4.5 1.8 - 7.7 K/uL Final     Comment:     The ANC is based on a white cell differential from an   automated cell counter. It has not been microscopically   reviewed for the presence of abnormal cells. Clinical   correlation is required.         Chemistry        Component Value Date/Time     (L) 12/10/2018 1037    K 4.6 12/10/2018 1037    CL 95 12/10/2018 1037    CO2 31 (H) 12/10/2018 1037    BUN 23 (H)  12/10/2018 1037    CREATININE 1.5 (H) 12/10/2018 1037     12/10/2018 1037        Component Value Date/Time    CALCIUM 9.7 12/10/2018 1037    ALKPHOS 74 12/10/2018 1037    AST 24 12/10/2018 1037    ALT 47 (H) 12/10/2018 1037    BILITOT 0.6 12/10/2018 1037    ESTGFRAFRICA >60.0 12/10/2018 1037    EGFRNONAA 53.5 (A) 12/10/2018 1037        Ma  Phos: 3.6    Assessment:       1. 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 Acute renal failure, unspecified acute renal failure type        Plan:        1,2,3. He will receive IV fluids today. Will recheck labs and fluids weekly, Advised him to increase oral hydration to 4 liters/day.  Will check CT neck/chest in 4 weeks and PET scan in 12 weeks.    Above care plan was discussed with patient and accompanying wife and all questions were addressed to their satisfaction

## 2018-12-10 NOTE — Clinical Note
Schedule CBC,CMp, CT neck, chest and see me in the week of 1/7/19Also schedule with Dr. Mccray that week

## 2018-12-10 NOTE — PROGRESS NOTES
Dec 10, 2018     Protocol: Phase 2, multi-center, randomized, double-blind, placebo controlled study to assess the safety and efficacy of topically applied  for the attenuation of oral mucositis in subjects with cancers of the head and neck receiving concomitant chemoradion therapy.    Protocol # -[id number]  IRB # 2017.177.B  PI: aNt Castillo MD  Version Date: 26-Mar-2018     Week Post RT week 2 visit 1:      Patient presented to [facility] clinic today for the above mentioned treatment.  Patient alert and oriented x 3. Mood and affect are appropriate to the situation. Patient denies any fever, pain or shortness of breath. Denies any vomiting, diarrhea or constipation at this time. Pt states he is still having problems with dry mouth with thick secretions but overall feels better this week than last.  Reviewed and confirmed which concomitant medications with the patient is currently taking. See concomitant therapy worksheet.  Lab work resulted and reviewed by Dr. Castillo. Creatine again increased today. Patient to receive IV fluids once again and instructed to have increased oral intake of water.  All other lab work out of range = not clinically significant per Dr. Castillo.  The patient verbalized understanding of this information and are in agreement of this treatment plan.      OM assessment completed. See oral assessment worksheet.  Patient states he has been compliant with his caries kit and daily diary.  Again reinforced with patient the importance of not missing doses of medication.     The patient is going to exchange his medications on the 12/12/18 this week as he forgot to bring his cooler this week.      Pt to return on 12/12/18 for week 2 post RT visit 2.  OM assessment at this visit along with drug exchange and diary exchange.     For most up to date Adverse Event log please see Research shadow paper chart.         Review of Adverse Events:  Fatigue.  Grade 1. AE Ongoing. Will monitor.  Nausea.  Grade 1.   AE Ongoing.  Will monitor.  Xerostomia.  Grade 2.  AE Ongoing.  Will monitor.  Dysphagia. Grade 2.  AE ongoing.  Will monitor.   Acute kidney injury. Grade 1.  AE ongoing.  Will monitor.      Baseline Medical History:    Erectile dysfunction. Grade 2.  Pt takes Viagra  Anxiety.  Grade 1   Diverticulitis.  History of .   Grade 1   Left neck pain.  Grade 1  Throat pain.  Grade 1  Right tonsillectomy

## 2018-12-12 ENCOUNTER — RESEARCH ENCOUNTER (OUTPATIENT)
Dept: RESEARCH | Facility: HOSPITAL | Age: 48
End: 2018-12-12

## 2018-12-12 DIAGNOSIS — Z00.6 EXAMINATION OF PARTICIPANT IN CLINICAL TRIAL: ICD-10-CM

## 2018-12-12 DIAGNOSIS — C09.9 MALIGNANT TUMOR OF PALATINE TONSIL: Primary | ICD-10-CM

## 2018-12-12 DIAGNOSIS — C09.9 TONSIL CANCER: ICD-10-CM

## 2018-12-12 NOTE — PROGRESS NOTES
December 12, 2018     Protocol: Phase 2, multi-center, randomized, double-blind, placebo controlled study to assess the safety and efficacy of topically applied  for the attenuation of oral mucositis in subjects with cancers of the head and neck receiving concomitant chemoradion therapy.    Protocol # -[id number]  IRB # 2017.177.B  PI: Nat Castillo MD  Version Date: 26-Mar-2018     Post-RT Week 2 Visit 2:  End of Active treatment     The following assessments and procedures were conducted:     Recorded AEs and SAEs.    Recorded analgesia use for oral pain.   Patient does complain of dry mouth but no difficulty swallowing.  Pt still does not have the ability to taste food. This does not affect what he is eating, however he does say that it is a miserable experience because of his dry mouth and lack of taste.   Recorded any concomitant medications.  Patient reports no new use of any mediations.   Recorded use and/or placement of gastrostomy tube.  Pt does not currently have a PEG.  Reviewed daily diary with patient.  Pt states he has been compliant with his daily diary.   Reviewed with the patient the analgesic information written in the diary and ensured complete analgesic information.  The importance of completing the diary reviewed with patient.  The OMDQ was completed prior to the oral assessment.     Assessment of compliance with caries prevention regimen was done.  Pt states he has been compliant.  Assessment compliance with IMP dosing was done.  Pt states he has missed numerous doses.   OM assessment and recorded and sent to study.  See assessment form.     Drug dispense done at this visit because pt forgot cooler at visit one of this week.    Kit numbers [id number] and [id number] both returned with 12 used and 12 unused bottles.  Kit numbers 1237  And 6130 dispensed to patient.      For most up to date Adverse Event log please see Research shadow paper chart.      Review of Adverse Events:  Fatigue.  Grade 1. AE  Ongoing.   Dysphagia.  Grade 1. AE Ongoing.  Will Monitor  Dry Mouth.  Grade 1.  AE ongoing. Patient is drinking water frequently to help with this.  Nausea.  Intermittent.  Grade 1.  AE Ongoing.  Will monitor.   Dysgeusia. Grade 1.  AE ongoing. Will continue to monitor.   Weight loss.  Grade 2.  AE ongoing.  Will monitor.     Baseline Medical History:     Erectile dysfunction. Grade 2.  Pt takes Viagra  Anxiety.  Grade 1   Diverticulitis.  History of . Grade 1   Left neck pain.  Grade 1  Throat pain.  Grade 1  Right tonsillectomy      Patient to return 12/19/18 for labs and OM assessment and to return all bottles of study drug as well as daily diary.

## 2018-12-19 ENCOUNTER — RESEARCH ENCOUNTER (OUTPATIENT)
Dept: RESEARCH | Facility: HOSPITAL | Age: 48
End: 2018-12-19

## 2018-12-19 VITALS
WEIGHT: 213.38 LBS | HEART RATE: 88 BPM | TEMPERATURE: 98 F | RESPIRATION RATE: 16 BRPM | SYSTOLIC BLOOD PRESSURE: 105 MMHG | DIASTOLIC BLOOD PRESSURE: 65 MMHG | BODY MASS INDEX: 28.94 KG/M2

## 2018-12-19 NOTE — PROGRESS NOTES
Dec 19, 2018     Protocol: Phase 2, multi-center, randomized, double-blind, placebo controlled study to assess the safety and efficacy of topically applied  for the attenuation of oral mucositis in subjects with cancers of the head and neck receiving concomitant chemoradion therapy.    Protocol # -[id number]  IRB # 2017.177.B  PI: Nat Castillo MD  Version Date: 26-Mar-2018     Short term follow up visit 1:    Recorded AEs and SAEs.    Recorded analgesia use for oral pain.   Patient does complain of dry mouth but no difficulty swallowing.  Pt states he is starting to  taste food again, but very minimal amount.   Recorded any concomitant medications.  Patient reports no new use of any mediations.   Recorded use and/or placement of gastrostomy tube.  Pt does not currently have a PEG.  Reviewed daily diary with patient.  Pt states he has been compliant with his daily diary.   Reviewed with the patient the analgesic information written in the diary and ensured complete analgesic information.  The importance of completing the diary reviewed with patient.  The OMDQ was completed prior to the oral assessment.     Assessment of compliance with caries prevention regimen was done.  Pt states he has been compliant.  Assessment compliance with IMP dosing was done.  Pt states he has missed numerous doses this week, more than usual.    OM assessment and recorded and sent to study.  See assessment form.     KPS = 90     Kit numbers [id number] and [id number] returned to patient with 5 used doses and 19 unused doses.         Pt to return on 12/27/18 for short term follow up visit 2.  OM assessment at this visit as well as lab with biomarker.     For most up to date Adverse Event log please see Research shadow paper chart.      Review of Adverse Events:  Fatigue.  Grade 1. AE Ongoing. Will monitor.  Nausea.  Grade 1.  AE Ongoing.  Will monitor.  Xerostomia.  Grade 2.  AE Ongoing.  Will monitor.  Dysphagia. Grade 2.  AE ongoing.  Will  monitor.        Baseline Medical History:    Erectile dysfunction. Grade 2.  Pt takes Viagra  Anxiety.  Grade 1   Diverticulitis.  History of .   Grade 1   Left neck pain.  Grade 1  Throat pain.  Grade 1  Right tonsillectomy

## 2018-12-21 ENCOUNTER — TELEPHONE (OUTPATIENT)
Dept: ADMINISTRATIVE | Facility: HOSPITAL | Age: 48
End: 2018-12-21

## 2018-12-21 NOTE — TELEPHONE ENCOUNTER
Pt returned phone call stating that he would like to choose Dr. Levi as his PCP.  Pt states he would like to thank Dr. Levi for going the extra mile when he came in for an urgent visit to see TAE Kaur. Pt states he had a swelling on the left side of his neck, which was an ongoing for years.  Pt stated Natasha consulted with Dr. Levi and he referred him to ENT with diagnostic testings, said Dr. Levi made sure the office followed up and made sure he had an appointment with ENT.   After follow up with ENT, pt was found to have cancer, pt started treatment right away and is now on his last Chemo treatment.   Pt would like to thank Dr. Levi for going above and beyond.

## 2018-12-27 ENCOUNTER — RESEARCH ENCOUNTER (OUTPATIENT)
Dept: RESEARCH | Facility: HOSPITAL | Age: 48
End: 2018-12-27

## 2018-12-27 DIAGNOSIS — Z00.6 EXAMINATION OF PARTICIPANT IN CLINICAL TRIAL: ICD-10-CM

## 2018-12-27 DIAGNOSIS — C09.9 TONSIL CANCER: Primary | ICD-10-CM

## 2018-12-27 RX ORDER — HEPARIN 100 UNIT/ML
500 SYRINGE INTRAVENOUS
Status: CANCELLED | OUTPATIENT
Start: 2018-12-27

## 2018-12-27 RX ORDER — SODIUM CHLORIDE 0.9 % (FLUSH) 0.9 %
10 SYRINGE (ML) INJECTION
Status: CANCELLED | OUTPATIENT
Start: 2018-12-27

## 2018-12-27 NOTE — PROGRESS NOTES
Dec 27, 2018     Protocol: Phase 2, multi-center, randomized, double-blind, placebo controlled study to assess the safety and efficacy of topically applied  for the attenuation of oral mucositis in subjects with cancers of the head and neck receiving concomitant chemoradion therapy.    Protocol # -[id number]  IRB # 2017.177.B  PI: Nat Castillo MD  Version Date: 26-Mar-2018     Short term follow up visit 2:    Recorded AEs and SAEs.    Recorded analgesia use for oral pain.   Patient does complain of continued dry mouth but no difficulty swallowing.  Pt states he is tasting food again.  Recorded any concomitant medications.  Patient reports no new use of any mediations.   Recorded use and/or placement of gastrostomy tube.  Pt does not currently have a PEG.   The OMDQ was completed prior to the oral assessment.     OM assessment and recorded and sent to study.  See assessment form.     KPS = 100        Pt to return on 1/04/19 for short term follow up visit 3.  OM assessment at this visit as well as lab with biomarker.     For most up to date Adverse Event log please see Research shadow paper chart.      Review of Adverse Events:  Fatigue.  Grade 1. AE Ongoing. Will monitor.  Nausea.  Grade 1.  AE Ongoing.  Will monitor.  Xerostomia.  Grade 2.  AE Ongoing.  Will monitor.     Baseline Medical History:    Erectile dysfunction. Grade 2.  Pt takes Viagra  Anxiety.  Grade 1   Diverticulitis.  History of .   Grade 1   Left neck pain.  Grade 1  Throat pain.  Grade 1  Right tonsillectomy

## 2019-01-04 ENCOUNTER — RESEARCH ENCOUNTER (OUTPATIENT)
Dept: RESEARCH | Facility: HOSPITAL | Age: 49
End: 2019-01-04

## 2019-01-04 NOTE — PROGRESS NOTES
Jan. 4, 2019     Protocol: Phase 2, multi-center, randomized, double-blind, placebo controlled study to assess the safety and efficacy of topically applied  for the attenuation of oral mucositis in subjects with cancers of the head and neck receiving concomitant chemoradion therapy.    Protocol # -FBNZ-201  IRB # 2017.177.B  PI: Nat Castillo MD  Version Date: 26-Mar-2018     Short term follow up visit 3:    Recorded AEs and SAEs.    Recorded analgesia use for oral pain. Patient does complain of continued dry mouth but no difficulty swallowing.  Pt states he is tasting food again. Patient states he still cannot eat any spice.  Recorded any concomitant medications.  Patient reports no new use of any mediations.   Recorded use and/or placement of gastrostomy tube.  Pt does not currently have a PEG.   The OMDQ was completed prior to the oral assessment.     OM assessment and recorded and sent to study.  See assessment form.     KPS = 100        Pt to return on 1/07/19 for short term follow up visit 4.  OM assessment at this visit as well as Dr. Castillo and CT scans.    For most up to date Adverse Event log please see Research shadow paper chart.      Review of Adverse Events:  Fatigue.  Grade 1. AE Ongoing. Will monitor.  Nausea.  Grade 1.  AE Ongoing.  Will monitor.  Xerostomia.  Grade 2.  AE Ongoing.  Will monitor.     Baseline Medical History:    Erectile dysfunction. Grade 2.  Pt takes Viagra  Anxiety.  Grade 1   Diverticulitis.  History of .   Grade 1   Left neck pain.  Grade 1  Throat pain.  Grade 1  Right tonsillectomy

## 2019-01-07 ENCOUNTER — OFFICE VISIT (OUTPATIENT)
Dept: HEMATOLOGY/ONCOLOGY | Facility: CLINIC | Age: 49
End: 2019-01-07
Payer: COMMERCIAL

## 2019-01-07 ENCOUNTER — HOSPITAL ENCOUNTER (OUTPATIENT)
Dept: RADIOLOGY | Facility: HOSPITAL | Age: 49
Discharge: HOME | End: 2019-01-07
Attending: INTERNAL MEDICINE
Payer: COMMERCIAL

## 2019-01-07 ENCOUNTER — LAB VISIT (OUTPATIENT)
Dept: LAB | Facility: HOSPITAL | Age: 49
End: 2019-01-07
Attending: INTERNAL MEDICINE
Payer: COMMERCIAL

## 2019-01-07 VITALS
TEMPERATURE: 99 F | WEIGHT: 220.88 LBS | HEIGHT: 72 IN | BODY MASS INDEX: 29.92 KG/M2 | DIASTOLIC BLOOD PRESSURE: 65 MMHG | RESPIRATION RATE: 18 BRPM | OXYGEN SATURATION: 99 % | SYSTOLIC BLOOD PRESSURE: 160 MMHG | HEART RATE: 84 BPM

## 2019-01-07 DIAGNOSIS — C09.9 MALIGNANT TUMOR OF PALATINE TONSIL: Primary | ICD-10-CM

## 2019-01-07 DIAGNOSIS — C09.9 MALIGNANT TUMOR OF PALATINE TONSIL: ICD-10-CM

## 2019-01-07 DIAGNOSIS — C09.9 TONSIL CANCER: ICD-10-CM

## 2019-01-07 LAB
ALBUMIN SERPL BCP-MCNC: 3.3 G/DL
ALP SERPL-CCNC: 52 U/L
ALT SERPL W/O P-5'-P-CCNC: 16 U/L
ANION GAP SERPL CALC-SCNC: 8 MMOL/L
AST SERPL-CCNC: 22 U/L
BILIRUB SERPL-MCNC: 0.4 MG/DL
BUN SERPL-MCNC: 12 MG/DL
CALCIUM SERPL-MCNC: 9 MG/DL
CHLORIDE SERPL-SCNC: 107 MMOL/L
CO2 SERPL-SCNC: 27 MMOL/L
CREAT SERPL-MCNC: 0.8 MG/DL
ERYTHROCYTE [DISTWIDTH] IN BLOOD BY AUTOMATED COUNT: 16.6 %
EST. GFR  (AFRICAN AMERICAN): >60 ML/MIN/1.73 M^2
EST. GFR  (NON AFRICAN AMERICAN): >60 ML/MIN/1.73 M^2
GLUCOSE SERPL-MCNC: 100 MG/DL
HCT VFR BLD AUTO: 30.8 %
HGB BLD-MCNC: 10.1 G/DL
IMM GRANULOCYTES # BLD AUTO: 0.12 K/UL
MCH RBC QN AUTO: 31.3 PG
MCHC RBC AUTO-ENTMCNC: 32.8 G/DL
MCV RBC AUTO: 95 FL
NEUTROPHILS # BLD AUTO: 2.7 K/UL
PLATELET # BLD AUTO: 256 K/UL
PMV BLD AUTO: 9.5 FL
POTASSIUM SERPL-SCNC: 3.9 MMOL/L
PROT SERPL-MCNC: 6.4 G/DL
RBC # BLD AUTO: 3.23 M/UL
SODIUM SERPL-SCNC: 142 MMOL/L
WBC # BLD AUTO: 4.78 K/UL

## 2019-01-07 PROCEDURE — 25500020 PHARM REV CODE 255: Performed by: INTERNAL MEDICINE

## 2019-01-07 PROCEDURE — 3008F BODY MASS INDEX DOCD: CPT | Mod: CPTII,S$GLB,, | Performed by: INTERNAL MEDICINE

## 2019-01-07 PROCEDURE — 71260 CT THORAX DX C+: CPT | Mod: TC

## 2019-01-07 PROCEDURE — 70491 CT NECK CHEST WITH CONTRAST (XPD): ICD-10-PCS | Mod: 26,,, | Performed by: INTERNAL MEDICINE

## 2019-01-07 PROCEDURE — 99999 PR PBB SHADOW E&M-EST. PATIENT-LVL IV: ICD-10-PCS | Mod: PBBFAC,,, | Performed by: INTERNAL MEDICINE

## 2019-01-07 PROCEDURE — 80053 COMPREHEN METABOLIC PANEL: CPT

## 2019-01-07 PROCEDURE — 71260 CT THORAX DX C+: CPT | Mod: 26,,, | Performed by: INTERNAL MEDICINE

## 2019-01-07 PROCEDURE — 36415 COLL VENOUS BLD VENIPUNCTURE: CPT

## 2019-01-07 PROCEDURE — 70491 CT SOFT TISSUE NECK W/DYE: CPT | Mod: 26,,, | Performed by: INTERNAL MEDICINE

## 2019-01-07 PROCEDURE — 99214 PR OFFICE/OUTPT VISIT, EST, LEVL IV, 30-39 MIN: ICD-10-PCS | Mod: S$GLB,,, | Performed by: INTERNAL MEDICINE

## 2019-01-07 PROCEDURE — 85027 COMPLETE CBC AUTOMATED: CPT

## 2019-01-07 PROCEDURE — 3008F PR BODY MASS INDEX (BMI) DOCUMENTED: ICD-10-PCS | Mod: CPTII,S$GLB,, | Performed by: INTERNAL MEDICINE

## 2019-01-07 PROCEDURE — 99999 PR PBB SHADOW E&M-EST. PATIENT-LVL IV: CPT | Mod: PBBFAC,,, | Performed by: INTERNAL MEDICINE

## 2019-01-07 PROCEDURE — 99214 OFFICE O/P EST MOD 30 MIN: CPT | Mod: S$GLB,,, | Performed by: INTERNAL MEDICINE

## 2019-01-07 PROCEDURE — 71260 CT NECK CHEST WITH CONTRAST (XPD): ICD-10-PCS | Mod: 26,,, | Performed by: INTERNAL MEDICINE

## 2019-01-07 RX ADMIN — IOHEXOL 100 ML: 350 INJECTION, SOLUTION INTRAVENOUS at 09:01

## 2019-01-07 NOTE — PROGRESS NOTES
"Subjective:       Patient ID: Frnakie Hoyt is a 48 y.o. male.    Chief Complaint: Malignant tumor of palatine tonsil and left side of throat---sore  ONCOLOGIC HISTORY: Mr. Frankie Hoyt is a 48-year-old smoker and tobacco chewer who had waxing and waning neck mass for the last 18 months.  He underwent a CT scan in February 2017 that was read as negative.  He noticed that his left cheek swelling really got worse in July 2018.  He was seen in Urgent Care and received antibiotics without any improvement.  Then he saw Dr. Franks and underwent FNA of the left neck, which was positive for malignant cells.  She also noted left tonsillar asymmetry.  CT neck at this time showed a mass in the left neck at the level of the angle of the mandible, which represented an   enlarged lymph node measuring 3.9 x 2.4 x 5.2 cm, mass demonstrated irregular margins in keeping with extracapsular extension.  There was encasement of the external carotid artery and internal jugular vein with less than 180-degree abutment of the internal and distal common carotid artery.  There was prominence of the left palatine tonsil as well as fullness of the left vallecula.  He was seen by Dr. Mccray and underwent a PET scan, which showed a hypermetabolic left cervical mass and bilateral palatine tonsils.  There was a low level uptake in the borderline right-sided cervical lymph node and no evidence of distant   Metastasis        He completed cycle 3 of chemo/RT with Cisplatin as of 12/4/18     HPIHe comes in to review his CT neck which reveals "size of patient's soft tissue left neck mass at the level of the mandible. Mild fullness of the left palatine tonsil. No significant lymphadenopathy within the neck or chest. Small volume fluid surrounding the anterior and left lateral proximal airway at the level of the mass.  Finding is nonspecific however may represent post therapy changes.  Recommend clinical correlation. Nonvisualization of the distal left " jugular vein suggesting thrombosis with collateral vessel formation.  0.4 cm solid pulmonary nodule within the right lower lobe, unchanged in comparison to 02/03/2017.  He feels better and he has been eating. Taste is better. Denies any pain or difficulty swallowing    Review of Systems   Constitutional: Negative for appetite change, fatigue and unexpected weight change.   HENT: Negative for mouth sores.    Eyes: Negative for visual disturbance.   Respiratory: Negative for cough and shortness of breath.    Cardiovascular: Negative for chest pain.   Gastrointestinal: Negative for abdominal pain and diarrhea.   Genitourinary: Negative for frequency.   Musculoskeletal: Negative for back pain.   Skin: Negative for rash.   Neurological: Negative for headaches.   Hematological: Negative for adenopathy.   Psychiatric/Behavioral: The patient is not nervous/anxious.    All other systems reviewed and are negative.      Objective:      Physical Exam   Constitutional: He is oriented to person, place, and time. He appears well-developed and well-nourished.   HENT:   Mouth/Throat: No oropharyngeal exudate.   Cardiovascular: Normal rate and normal heart sounds.   Pulmonary/Chest: Effort normal and breath sounds normal. He has no wheezes.   Abdominal: Soft. Bowel sounds are normal. There is no tenderness.   Musculoskeletal: He exhibits no edema or tenderness.   Lymphadenopathy:     He has no cervical adenopathy.   Neurological: He is alert and oriented to person, place, and time. Coordination normal.   Skin: Skin is warm and dry. No rash noted.   Psychiatric: He has a normal mood and affect. Judgment and thought content normal.   Vitals reviewed.      LABS:  WBC   Date Value Ref Range Status   01/07/2019 4.78 3.90 - 12.70 K/uL Final     Hemoglobin   Date Value Ref Range Status   01/07/2019 10.1 (L) 14.0 - 18.0 g/dL Final     Hematocrit   Date Value Ref Range Status   01/07/2019 30.8 (L) 40.0 - 54.0 % Final     Platelets   Date  Value Ref Range Status   01/07/2019 256 150 - 350 K/uL Final     Gran # (ANC)   Date Value Ref Range Status   01/07/2019 2.7 1.8 - 7.7 K/uL Final     Comment:     The ANC is based on a white cell differential from an   automated cell counter. It has not been microscopically   reviewed for the presence of abnormal cells. Clinical   correlation is required.         Chemistry        Component Value Date/Time     01/07/2019 1011    K 3.9 01/07/2019 1011     01/07/2019 1011    CO2 27 01/07/2019 1011    BUN 12 01/07/2019 1011    CREATININE 0.8 01/07/2019 1011     01/07/2019 1011        Component Value Date/Time    CALCIUM 9.0 01/07/2019 1011    ALKPHOS 52 (L) 01/07/2019 1011    AST 22 01/07/2019 1011    ALT 16 01/07/2019 1011    BILITOT 0.4 01/07/2019 1011    ESTGFRAFRICA >60.0 01/07/2019 1011    EGFRNONAA >60.0 01/07/2019 1011          Assessment:       1. Malignant tumor of palatine tonsil        Plan:        1. He has now completed chemo/RT with decrease in size of tonsillar malignancy. He will follow-up with Dr. Mccray next week.  I will plan on seeing him in 8 weeks with labs and PET scan    Above care plan was discussed with patient and accompanying wife and all questions were addressed to their satisfaction

## 2019-01-11 ENCOUNTER — RESEARCH ENCOUNTER (OUTPATIENT)
Dept: RESEARCH | Facility: HOSPITAL | Age: 49
End: 2019-01-11

## 2019-01-14 NOTE — PROGRESS NOTES
Jan. 11, 2019     Protocol: Phase 2, multi-center, randomized, double-blind, placebo controlled study to assess the safety and efficacy of topically applied  for the attenuation of oral mucositis in subjects with cancers of the head and neck receiving concomitant chemoradion therapy.    Protocol # -DIVS-535  IRB # 2017.177.B  PI: Nat Castillo MD  Version Date: 26-Mar-2018     Short term follow up visit 4:    Recorded AEs and SAEs.    Recorded analgesia use for oral pain. Patient does not complain of any pain or mouth discomfort.   Recorded any concomitant medications.  Patient reports no new use of any mediations.   Recorded use and/or placement of gastrostomy tube.  Pt does not currently have a PEG.   The OMDQ was completed prior to the oral assessment.     OM assessment and recorded and sent to study.  See assessment form.     KPS = 100        Pt to return in 3 months for long term follow up.  Tumor status to be done at that time.     For most up to date Adverse Event log please see Research shadow paper chart.      Review of Adverse Events:  Xerostomia.  Grade 1.  AE Ongoing.  Will monitor.     Baseline Medical History:    Erectile dysfunction. Grade 2.  Pt takes Viagra  Anxiety.  Grade 1   Diverticulitis.  History of .   Grade 1   Left neck pain.  Grade 1  Throat pain.  Grade 1  Right tonsillectomy

## 2019-01-15 ENCOUNTER — OFFICE VISIT (OUTPATIENT)
Dept: OTOLARYNGOLOGY | Facility: CLINIC | Age: 49
End: 2019-01-15
Payer: COMMERCIAL

## 2019-01-15 VITALS
HEART RATE: 76 BPM | WEIGHT: 219.81 LBS | BODY MASS INDEX: 29.81 KG/M2 | DIASTOLIC BLOOD PRESSURE: 76 MMHG | SYSTOLIC BLOOD PRESSURE: 129 MMHG

## 2019-01-15 DIAGNOSIS — C09.9 MALIGNANT TUMOR OF PALATINE TONSIL: Primary | ICD-10-CM

## 2019-01-15 PROCEDURE — 99214 PR OFFICE/OUTPT VISIT, EST, LEVL IV, 30-39 MIN: ICD-10-PCS | Mod: 25,S$GLB,, | Performed by: OTOLARYNGOLOGY

## 2019-01-15 PROCEDURE — 99214 OFFICE O/P EST MOD 30 MIN: CPT | Mod: 25,S$GLB,, | Performed by: OTOLARYNGOLOGY

## 2019-01-15 PROCEDURE — 99999 PR PBB SHADOW E&M-EST. PATIENT-LVL III: ICD-10-PCS | Mod: PBBFAC,,, | Performed by: OTOLARYNGOLOGY

## 2019-01-15 PROCEDURE — 31575 PR LARYNGOSCOPY, FLEXIBLE; DIAGNOSTIC: ICD-10-PCS | Mod: S$GLB,,, | Performed by: OTOLARYNGOLOGY

## 2019-01-15 PROCEDURE — 3008F BODY MASS INDEX DOCD: CPT | Mod: CPTII,S$GLB,, | Performed by: OTOLARYNGOLOGY

## 2019-01-15 PROCEDURE — 31575 DIAGNOSTIC LARYNGOSCOPY: CPT | Mod: S$GLB,,, | Performed by: OTOLARYNGOLOGY

## 2019-01-15 PROCEDURE — 99999 PR PBB SHADOW E&M-EST. PATIENT-LVL III: CPT | Mod: PBBFAC,,, | Performed by: OTOLARYNGOLOGY

## 2019-01-15 PROCEDURE — 3008F PR BODY MASS INDEX (BMI) DOCUMENTED: ICD-10-PCS | Mod: CPTII,S$GLB,, | Performed by: OTOLARYNGOLOGY

## 2019-01-15 NOTE — ASSESSMENT & PLAN NOTE
Clinically he looks great. There remains some lateral pharyngeal fullness and level II fullness without central necrosis on the CT scan. He is making tremendous progress overall, however. I would like to see the PET-CT in about 6 weeks, which has already been set up. We had a long discussion about the importance of the PET-CT in our next decision - if there remains PET avid disease in either the primary or neck, then we will need to consider excision and neck dissection, or at least obtain an ultrasound-guided FNA. He expressed understanding.

## 2019-01-15 NOTE — PATIENT INSTRUCTIONS
Please be aware of the symptoms of head and neck cancer -  including, but not limited to, swelling in the neck, changes in voice and swallowing, and pain -- contact me if any of these symptoms appear and persist for more than 3-4 weeks.   Please return in 6 weeks, after the PET-CT

## 2019-01-15 NOTE — PROGRESS NOTES
Subjective:       Patient ID: Frankie Hoyt is a 48 y.o. male.    Chief Complaint: Follow-up ([id number] p16+ SCC of tonsil)       Malignant tumor of palatine tonsil    9/18/2018 Biopsy     FNA =   Supplemental Diagnosis  Cellblock confirms squamous carcinoma with necrosis  Immunostain for p16 is positive. The controls stain appropriately         9/19/2018 Initial Diagnosis     Malignant tumor of palatine tonsil         9/20/2018 Tumor Conference     His case was discussed at the Multidisciplinary Head and Neck Team Planning Meeting.    Representatives from Medical Oncology, Radiation Oncology, Head and Neck Surgical Oncology, Psychosocial Oncology, and Speech and Language Pathology discussed the case with the following recommendations:    1) diagnostic right tonsillectomy  2) chemoradiation                 9/22/2018 Tumor Markers     Patient's tumor was tested for the following markers: p16.                                              Results of the tumor marker test revealed positive         10/3/2018 Surgery     Right tonsillectomy         10/4/2018 Tumor Conference     His case was discussed at the Multidisciplinary Head and Neck Team Planning Meeting.    Representatives from Medical Oncology, Radiation Oncology, Head and Neck Surgical Oncology, Psychosocial Oncology, and Speech and Language Pathology discussed the case with the following recommendations:    1) Chemoradiation                 10/16/2018 - 11/27/2018 Radiation Therapy     Treating physician: Dr. Monte  Total Dose: 70 Gy  Fractions: 35/35         10/16/2018 - 12/1/2018 Chemotherapy     Treatment Summary   Plan Name: OP HEAD NECK CISPLATIN + RADIOTHERAPY  Treatment Goal: Curative  Status: Inactive  Start Date: [No treatment day found]  End Date: [No treatment day found]  Provider: Nat Castillo MD  Chemotherapy: CISplatin (PLATINOL) 100 mg/m2 = 245 mg in sodium chloride 0.9% 500 mL chemo infusion, 100 mg/m2 = 245 mg (original dose ), Intravenous,  "Clinic/HOD 1 time, 0 of 6 cycles  Dose modification: 100 mg/m2 (Cycle 1, Reason: Error Acknowledged)    Plan Name: OP HEAD NECK CISPLATIN Q3W  Treatment Goal: Curative  Status: Active  Start Date: 10/16/2018  End Date: 12/1/2018 (Planned)  Provider: Nat Castillo MD  Chemotherapy: CISplatin (PLATINOL) 100 mg/m2 = 245 mg in sodium chloride 0.9% 500 mL chemo infusion, 100 mg/m2 = 245 mg, Intravenous, Clinic/HOD 1 time, 3 of 3 cycles  Administration: 245 mg (10/16/2018), 245 mg (11/5/2018), 245 mg (11/26/2018)                HPI     Frankie Hoyt returns for his first post-treatment visit. He has no complaints, other than some soft swelling in the neck. He has no pain. He lost about 50 pounds during his course, but he feels good and is back to normal (almost). He is sensitive to black pepper and notes a mild dry mouth that is worse at night. The soft neck swelling comes and goes - it did not appear until after treatment ended. He has had a CT scan and has met with Dr. Castillo. His energy is "great" - he is going to the gym. He is eating "anything I want". He cannot taste sweets, and he notes that this was the last thing to go.    Past Medical History:   Diagnosis Date    Anxiety     Cancer of tonsil     left tonsil    Diverticular disease 2012       Past Surgical History:   Procedure Laterality Date    COLONOSCOPY  2014    colonscopy  2012    LARYNGOSCOPY N/A 10/3/2018    Performed by Serge Mccray MD at [facility] OR [address] [place] [address]    tonsil biopsy 2018      TONSILLECTOMY Right 10/3/2018    Performed by Serge Mccray MD at [facility] OR [address] [place] [address]       Current Outpatient Medications:     dexamethasone (DECADRON) 6 MG tablet, Take 1 tablet twice daily for 4 days after chemo, Disp: 24 tablet, Rfl: 0    INV  solution, Swish and spit 15 mLs 3 (three) times daily. Mix solution & -JO/placebo vial. Shake for 30 secs. Rinse mouth for 30 secs three times daily then expectorate. Take after meals. Don't eat, brush teeth or use " mouthwash 15 mins prior/90 mins after. For Investigational Use Only. Protocol -NCKM-915. Patient Study #0108-003., Disp: 24 vial, Rfl: 0    INV -PU/placebo 2.6 X 10,11, CFU vial, Swish and spit 1 vial 3 (three) times daily. Mix solution & -PG/placebo vial. Shake for 30 secs. Rinse mouth for 30 secs three times daily then expectorate. Take after meals. Don't eat, brush teeth or use mouthwash 15 mins prior/90 mins after. For Investigational Use Only. Protocol: -QWFS-010. Patient Study #0108-003., Disp: 24 vial, Rfl: 0    lidocaine HCl 2% (XYLOCAINE) 2 % Soln, Take 5 ml by mouth swish and swallow 4 times daily, Disp: 500 mL, Rfl: 3    OLANZapine (ZYPREXA) 10 MG tablet, Take for 4 days after chemo, start on the day of chemo, Disp: 15 tablet, Rfl: 2    ondansetron (ZOFRAN-ODT) 8 MG TbDL, Take 1 tablet (8 mg total) by mouth every 6 (six) hours as needed., Disp: 60 tablet, Rfl: 4    oxyCODONE-acetaminophen (PERCOCET) 7.5-325 mg per tablet, Take 1 tablet by mouth every 4 (four) hours as needed for Pain., Disp: 60 tablet, Rfl: 0    promethazine (PHENERGAN) 6.25 mg/5 mL syrup, Take 20 mLs (25 mg total) by mouth every 6 (six) hours as needed for Nausea., Disp: 473 mL, Rfl: 6    sildenafil (VIAGRA) 100 MG tablet, Take 1 tablet (100 mg total) by mouth daily as needed for Erectile Dysfunction., Disp: 6 tablet, Rfl: 1    sodium chloride (SALINE NASAL) 0.65 % nasal spray, 2 sprays by Nasal route 2 (two) times daily., Disp: , Rfl: 12    Review of patient's allergies indicates:  No Known Allergies    Social History     Socioeconomic History    Marital status:      Spouse name: Not on file    Number of children: Not on file    Years of education: Not on file    Highest education level: Not on file   Social Needs    Financial resource strain: Not on file    Food insecurity - worry: Not on file    Food insecurity - inability: Not on file    Transportation needs - medical: Not on file     Transportation needs - non-medical: Not on file   Occupational History    Not on file   Tobacco Use    Smoking status: Former Smoker     Types: Cigars     Last attempt to quit:      Years since quittin.0    Smokeless tobacco: Former User    Tobacco comment: 1 per weekend   Substance and Sexual Activity    Alcohol use: Yes     Alcohol/week: 7.2 oz     Types: 12 Cans of beer per week     Comment: weekend    Drug use: No    Sexual activity: Yes     Partners: Female   Other Topics Concern    Patient feels they ought to cut down on drinking/drug use Not Asked    Patient annoyed by others criticizing their drinking/drug use Not Asked    Patient has felt bad or guilty about drinking/drug use Not Asked    Patient has had a drink/used drugs as an eye opener in the AM Not Asked   Social History Narrative     Frankie Hoyt lives with his wife and 2 younger children in [place], [place].  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  His spouse is Kym.   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 Frankie Hoyt's hobbies include working out       Family History   Problem Relation Age of Onset    Cancer Father         colon and prostate cancer    Heart disease Father      Review of Systems   Constitutional: Negative for appetite change, chills, diaphoresis, fatigue, fever and unexpected weight change.   HENT: Negative for congestion, dental problem, drooling, ear discharge, ear pain, facial swelling, hearing loss, mouth sores, nosebleeds, postnasal drip, rhinorrhea, sinus pressure, sneezing, sore throat, tinnitus, trouble swallowing and voice change.    Eyes: Negative for pain, discharge, redness and itching.   Respiratory: Negative for cough and shortness of breath.    Cardiovascular: Negative for chest pain.   Gastrointestinal: Negative for abdominal distention, abdominal pain, diarrhea, nausea and  vomiting.   Endocrine: Negative for cold intolerance and heat intolerance.   Genitourinary: Negative for difficulty urinating.   Musculoskeletal: Negative for neck pain and neck stiffness.   Skin: Negative for rash.   Neurological: Negative for dizziness, weakness and headaches.   Hematological: Negative for adenopathy.       Objective:      Physical Exam   Constitutional: He is oriented to person, place, and time. He appears well-developed and well-nourished. He is cooperative.   HENT:   Head: Normocephalic and atraumatic.   Right Ear: Hearing, tympanic membrane, external ear and ear canal normal.   Left Ear: Hearing, tympanic membrane, external ear and ear canal normal.   Nose: Nose normal. No rhinorrhea, nasal deformity or septal deviation. Right sinus exhibits no maxillary sinus tenderness and no frontal sinus tenderness. Left sinus exhibits no maxillary sinus tenderness and no frontal sinus tenderness.   Mouth/Throat: Uvula is midline, oropharynx is clear and moist and mucous membranes are normal. He does not have dentures. No oral lesions. No trismus in the jaw. No oropharyngeal exudate or posterior oropharyngeal edema.       Procedure: Flexible laryngoscopy  In order to fully examine the upper aerodigestive tract, including the larynx, in a patient with a hyperactive gag reflex, flexible endoscopy is required.  After explaining the procedure and obtaining verbal consent, a timeout was performed with the patient's participation according to the universal protocol. Both nasal cavities were anesthetized with 4% Xylocaine spray mixed with Salvatore-Synephrine. The flexible laryngoscope (#[id number]) was inserted into the nasal cavity and advanced to visualize the nasal cavity, nasopharynx, the posterior oropharynx, hypopharynx, and the endolarynx with the above findings noted. The scope was removed and the procedure terminated. The patient tolerated this procedure well without apparent complication.       FINDINGS  Nasopharynx - the torus is clear. There are no lesions of the posterior wall.   Oropharynx - no lesions of the tongue base. There is no obvious fullness or asymmetry. There is some blunting of the leftward vallecula and appropriate edema of the left AE fold  Hypopharynx - there are no lesions of the pyriform sinuses or postcricoid region    Larynx - there are no lesions of the supraglottic or glottic larynx. Vocal fold mobility is normal with complete closure.      Eyes: Conjunctivae and EOM are normal. Pupils are equal, round, and reactive to light. Right eye exhibits no chemosis. Left eye exhibits no chemosis.   Neck: Trachea normal, normal range of motion and phonation normal. Neck supple. No JVD present. No tracheal tenderness present. No tracheal deviation and no edema present. No thyroid mass and no thyromegaly present.       Salivary glands - there are no lesions, and there is no asymmetry of the parotid and submandibular glands   Cardiovascular: Normal rate, regular rhythm and intact distal pulses.   Pulmonary/Chest: Effort normal. No accessory muscle usage or stridor. No tachypnea. No respiratory distress.   Abdominal: Normal appearance. He exhibits no distension. There is no guarding.   Lymphadenopathy:     He has cervical adenopathy.        Right cervical: No deep cervical and no posterior cervical adenopathy present.       Left cervical: Deep cervical adenopathy present. No posterior cervical adenopathy present.        Right: No supraclavicular adenopathy present.        Left: No supraclavicular adenopathy present.   Persistent fullness, but much smaller, in level II   Neurological: He is alert and oriented to person, place, and time. No cranial nerve deficit. Gait normal.   Skin: Skin is warm and dry. No lesion noted.   Psychiatric: He has a normal mood and affect. His speech is normal.   Vitals reviewed.      CT Neck 1/7/2019:  Impression       1. Decreased size of patient's left neck  mass at the level of the mandible with the changes in the adjacent soft tissues reflecting radiation change.  2. Mild fullness of the left palatine tonsil.  3. No significant lymphadenopathy within the neck or chest.  4. Small volume fluid along the left vallecula, posterior to the hyoid and extending to the expected region of the left piriform sinus.  Finding is nonspecific however may represent post therapy changes.  Recommend clinical correlation.  5. Nonvisualization of the cranial aspect of the left jugular vein suggesting thrombosis with collateral vessel formation.  6. 0.4 cm solid pulmonary nodule within the right lower lobe, unchanged in comparison to 02/03/2017.       Assessment and Plan:       Problem List Items Addressed This Visit     Malignant tumor of palatine tonsil - Primary     Clinically he looks great. There remains some lateral pharyngeal fullness and level II fullness without central necrosis on the CT scan. He is making tremendous progress overall, however. I would like to see the PET-CT in about 6 weeks, which has already been set up. We had a long discussion about the importance of the PET-CT in our next decision - if there remains PET avid disease in either the primary or neck, then we will need to consider excision and neck dissection, or at least obtain an ultrasound-guided FNA. He expressed understanding.

## 2019-01-22 ENCOUNTER — OFFICE VISIT (OUTPATIENT)
Dept: RADIATION ONCOLOGY | Facility: CLINIC | Age: 49
End: 2019-01-22
Payer: COMMERCIAL

## 2019-01-22 VITALS
DIASTOLIC BLOOD PRESSURE: 70 MMHG | WEIGHT: 222.69 LBS | TEMPERATURE: 98 F | HEART RATE: 65 BPM | BODY MASS INDEX: 30.16 KG/M2 | HEIGHT: 72 IN | RESPIRATION RATE: 18 BRPM | SYSTOLIC BLOOD PRESSURE: 155 MMHG

## 2019-01-22 DIAGNOSIS — C09.9 MALIGNANT TUMOR OF PALATINE TONSIL: Primary | ICD-10-CM

## 2019-01-22 PROCEDURE — 99999 PR PBB SHADOW E&M-EST. PATIENT-LVL III: ICD-10-PCS | Mod: PBBFAC,,, | Performed by: RADIOLOGY

## 2019-01-22 PROCEDURE — 99999 PR PBB SHADOW E&M-EST. PATIENT-LVL III: CPT | Mod: PBBFAC,,, | Performed by: RADIOLOGY

## 2019-01-22 PROCEDURE — 99499 UNLISTED E&M SERVICE: CPT | Mod: S$GLB,,, | Performed by: RADIOLOGY

## 2019-01-22 PROCEDURE — 99499 NO LOS: ICD-10-PCS | Mod: S$GLB,,, | Performed by: RADIOLOGY

## 2019-01-22 NOTE — PROGRESS NOTES
Subjective:       Patient ID: Frankie Hoyt is a 48 y.o. male.    Chief Complaint: Head and Neck cancer (follow up)    This patient presents for his initial follow up visit.      Mr. 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  He noticed left cheek swelling in July 2018.  He was seen by Dr. Franks and FNA of the left neck mass was positive for malignant cells. On exam she noted some L tonsillar asymmetry but no other abnormalities on scope exam. CT neck showed a mass within the left neck at the level of the angle of the mandible which likely represents an enlarged lymph node or lymph node conglomerate, measuring 3.9 x 2.4 x 5.2 cm. Mass demonstrates irregular margins in keeping with extracapsular extension. There is encasement of the external carotid artery and internal jugular vein with less than 180 degree abutment of the internal and distal common carotid artery. There is non visualization of the left internal jugular vein, probably occluded. There is prominence of the left palatine tonsil as well as fullness of the left vallecula. He underwent a PET/CT yesterday showing a hypermetabolic left cervical mass and bilateral palatine tonsils. Low level uptake within a borderline sized right cervical node. No evidence of distant metastatic disease. The patient was referred for definitive chemoradiotherapy.  He completed 70 Gy to the areas of gross disease on 11/27/18.  Today, the patient states he feels well.  Returned to working out daily.  Notes some very mild discomfort with swallowing.  No complaints of cough or SOB.        Review of Systems   Constitutional: Negative for activity change, appetite change, chills, fatigue and fever.   HENT: Negative for facial swelling, hearing loss, mouth sores, sinus pressure, sinus pain, sore throat and trouble swallowing.    Respiratory: Negative for cough, choking and shortness of breath.        Objective:      Physical Exam    Constitutional: He appears well-developed and well-nourished. No distress.   HENT:   Head: Normocephalic and atraumatic.       Mouth/Throat: Oropharynx is clear and moist and mucous membranes are normal. He does not have dentures. No oral lesions. No trismus in the jaw. Normal dentition. No dental caries.   Lymphadenopathy:        Head (right side): No submental, no submandibular and no preauricular adenopathy present.        Head (left side): No submental, no submandibular and no preauricular adenopathy present.     He has no cervical adenopathy.        Right: No supraclavicular adenopathy present.        Left: No supraclavicular adenopathy present.       CT of the soft tissues of the neck  and chest on 1/7/19 revealed 1. Decreased size of patient's left neck mass at the level of the mandible with the changes in the adjacent soft tissues reflecting radiation change.  2. Mild fullness of the left palatine tonsil.  3. No significant lymphadenopathy within the neck or chest.  4. Small volume fluid along the left vallecula, posterior to the hyoid and extending to the expected region of the left piriform sinus.  Finding is nonspecific however may represent post therapy changes.  Recommend clinical correlation.  5. Nonvisualization of the cranial aspect of the left jugular vein suggesting thrombosis with collateral vessel formation.  6. 0.4 cm solid pulmonary nodule within the right lower lobe, unchanged in comparison to 02/03/2017.  Assessment:       1. Malignant tumor of palatine tonsil        Plan:       Doing well, recovered very well from the acute effects of therapy.  Discussed his continued dental care.  Will refer to dentist for dental trays.  He is planned for repeat PET scan.  Discussed possible PET results and management going forward.  He will continue follow up with ENT and oncology.

## 2019-02-26 ENCOUNTER — HOSPITAL ENCOUNTER (OUTPATIENT)
Dept: RADIOLOGY | Facility: HOSPITAL | Age: 49
Discharge: HOME | End: 2019-02-26
Attending: INTERNAL MEDICINE
Payer: COMMERCIAL

## 2019-02-26 DIAGNOSIS — C09.9 MALIGNANT TUMOR OF PALATINE TONSIL: ICD-10-CM

## 2019-02-26 LAB — POCT GLUCOSE: 96 MG/DL (ref 70–110)

## 2019-02-26 PROCEDURE — A9552 F18 FDG: HCPCS

## 2019-02-26 PROCEDURE — 78815 PET IMAGE W/CT SKULL-THIGH: CPT | Mod: 26,PS,, | Performed by: RADIOLOGY

## 2019-02-26 PROCEDURE — 78815 NM PET CT ROUTINE: ICD-10-PCS | Mod: 26,PS,, | Performed by: RADIOLOGY

## 2019-02-26 PROCEDURE — 78815 PET IMAGE W/CT SKULL-THIGH: CPT | Mod: TC

## 2019-02-28 ENCOUNTER — LAB VISIT (OUTPATIENT)
Dept: LAB | Facility: HOSPITAL | Age: 49
End: 2019-02-28
Attending: INTERNAL MEDICINE
Payer: COMMERCIAL

## 2019-02-28 ENCOUNTER — TELEPHONE (OUTPATIENT)
Dept: HEMATOLOGY/ONCOLOGY | Facility: CLINIC | Age: 49
End: 2019-02-28

## 2019-02-28 ENCOUNTER — OFFICE VISIT (OUTPATIENT)
Dept: HEMATOLOGY/ONCOLOGY | Facility: CLINIC | Age: 49
End: 2019-02-28
Payer: COMMERCIAL

## 2019-02-28 VITALS
OXYGEN SATURATION: 100 % | HEART RATE: 71 BPM | TEMPERATURE: 99 F | WEIGHT: 222.25 LBS | SYSTOLIC BLOOD PRESSURE: 116 MMHG | HEIGHT: 72 IN | BODY MASS INDEX: 30.1 KG/M2 | RESPIRATION RATE: 18 BRPM | DIASTOLIC BLOOD PRESSURE: 73 MMHG

## 2019-02-28 DIAGNOSIS — C09.9 MALIGNANT TUMOR OF PALATINE TONSIL: Primary | ICD-10-CM

## 2019-02-28 DIAGNOSIS — C09.9 MALIGNANT TUMOR OF PALATINE TONSIL: ICD-10-CM

## 2019-02-28 LAB
ALBUMIN SERPL BCP-MCNC: 3.7 G/DL
ALP SERPL-CCNC: 47 U/L
ALT SERPL W/O P-5'-P-CCNC: 15 U/L
ANION GAP SERPL CALC-SCNC: 8 MMOL/L
AST SERPL-CCNC: 21 U/L
BILIRUB SERPL-MCNC: 0.6 MG/DL
BUN SERPL-MCNC: 21 MG/DL
CALCIUM SERPL-MCNC: 9.5 MG/DL
CHLORIDE SERPL-SCNC: 106 MMOL/L
CO2 SERPL-SCNC: 26 MMOL/L
CREAT SERPL-MCNC: 1.1 MG/DL
ERYTHROCYTE [DISTWIDTH] IN BLOOD BY AUTOMATED COUNT: 11.9 %
EST. GFR  (AFRICAN AMERICAN): >60 ML/MIN/1.73 M^2
EST. GFR  (NON AFRICAN AMERICAN): >60 ML/MIN/1.73 M^2
GLUCOSE SERPL-MCNC: 103 MG/DL
HCT VFR BLD AUTO: 36.5 %
HGB BLD-MCNC: 12.4 G/DL
IMM GRANULOCYTES # BLD AUTO: 0.02 K/UL
MCH RBC QN AUTO: 32 PG
MCHC RBC AUTO-ENTMCNC: 34 G/DL
MCV RBC AUTO: 94 FL
NEUTROPHILS # BLD AUTO: 2.6 K/UL
PLATELET # BLD AUTO: 234 K/UL
PMV BLD AUTO: 8.9 FL
POTASSIUM SERPL-SCNC: 4.2 MMOL/L
PROT SERPL-MCNC: 6.7 G/DL
RBC # BLD AUTO: 3.87 M/UL
SODIUM SERPL-SCNC: 140 MMOL/L
T4 FREE SERPL-MCNC: 0.8 NG/DL
TSH SERPL DL<=0.005 MIU/L-ACNC: 1.69 UIU/ML
WBC # BLD AUTO: 4.47 K/UL

## 2019-02-28 PROCEDURE — 84443 ASSAY THYROID STIM HORMONE: CPT

## 2019-02-28 PROCEDURE — 3008F PR BODY MASS INDEX (BMI) DOCUMENTED: ICD-10-PCS | Mod: CPTII,S$GLB,, | Performed by: INTERNAL MEDICINE

## 2019-02-28 PROCEDURE — 36415 COLL VENOUS BLD VENIPUNCTURE: CPT

## 2019-02-28 PROCEDURE — 84439 ASSAY OF FREE THYROXINE: CPT

## 2019-02-28 PROCEDURE — 99214 OFFICE O/P EST MOD 30 MIN: CPT | Mod: S$GLB,,, | Performed by: INTERNAL MEDICINE

## 2019-02-28 PROCEDURE — 99214 PR OFFICE/OUTPT VISIT, EST, LEVL IV, 30-39 MIN: ICD-10-PCS | Mod: S$GLB,,, | Performed by: INTERNAL MEDICINE

## 2019-02-28 PROCEDURE — 99999 PR PBB SHADOW E&M-EST. PATIENT-LVL III: ICD-10-PCS | Mod: PBBFAC,,, | Performed by: INTERNAL MEDICINE

## 2019-02-28 PROCEDURE — 85027 COMPLETE CBC AUTOMATED: CPT

## 2019-02-28 PROCEDURE — 99999 PR PBB SHADOW E&M-EST. PATIENT-LVL III: CPT | Mod: PBBFAC,,, | Performed by: INTERNAL MEDICINE

## 2019-02-28 PROCEDURE — 3008F BODY MASS INDEX DOCD: CPT | Mod: CPTII,S$GLB,, | Performed by: INTERNAL MEDICINE

## 2019-02-28 PROCEDURE — 80053 COMPREHEN METABOLIC PANEL: CPT

## 2019-02-28 NOTE — TELEPHONE ENCOUNTER
----- Message from Nat Castillo MD sent at 2/28/2019 11:24 AM CST -----  Schedule to see Dr. Mccray next week for follow-up (any day but Friday)    Schedule CBC,CMP, Mag and phos and see me in 6 months

## 2019-02-28 NOTE — PROGRESS NOTES
"Subjective:       Patient ID: Frankie Hoyt is a 48 y.o. male.    Chief Complaint: Malignant tumor of palatine tonsil  ONCOLOGIC HISTORY: Mr. Frankie Hoyt is a 48-year-old smoker and tobacco chewer who had waxing and waning neck mass for the last 18 months.  He underwent a CT scan in February 2017 that was read as negative.  He noticed that his left cheek swelling really got worse in July 2018.  He was seen in Urgent Care and received antibiotics without any improvement.  Then he saw Dr. Franks and underwent FNA of the left neck, which was positive for malignant cells.  She also noted left tonsillar asymmetry.  CT neck at this time showed a mass in the left neck at the level of the angle of the mandible, which represented an   enlarged lymph node measuring 3.9 x 2.4 x 5.2 cm, mass demonstrated irregular margins in keeping with extracapsular extension.  There was encasement of the external carotid artery and internal jugular vein with less than 180-degree abutment of the internal and distal common carotid artery.  There was prominence of the left palatine tonsil as well as fullness of the left vallecula.  He was seen by Dr. Mccray and underwent a PET scan, which showed a hypermetabolic left cervical mass and bilateral palatine tonsils.  There was a low level uptake in the borderline right-sided cervical lymph node and no evidence of distant   Metastasis        He completed cycle 3 of chemo/RT with Cisplatin as of 12/4/18    HPIHe comes in to review his PET scan which reveals "Generalized improvement neoplastic hypermetabolism with minimal residual left lingular tonsil hypermetabolism which may relate to posttherapy inflammatory change, noting some element of residual neoplastic disease is not ruled out.  Direct visualization could be performed if clinically warranted for further evaluation"  He lacks taste but no trouble swallowing    Review of Systems   Constitutional: Negative for appetite change, fatigue and " unexpected weight change.   HENT: Negative for mouth sores.    Eyes: Negative for visual disturbance.   Respiratory: Negative for cough and shortness of breath.    Cardiovascular: Negative for chest pain.   Gastrointestinal: Negative for abdominal pain and diarrhea.   Genitourinary: Negative for frequency.   Musculoskeletal: Negative for back pain.   Skin: Negative for rash.   Neurological: Negative for headaches.   Hematological: Negative for adenopathy.   Psychiatric/Behavioral: The patient is not nervous/anxious.    All other systems reviewed and are negative.      Objective:      Physical Exam   Constitutional: He is oriented to person, place, and time. He appears well-developed and well-nourished.   HENT:   Mouth/Throat: No oropharyngeal exudate.   Cardiovascular: Normal rate and normal heart sounds.   Pulmonary/Chest: Effort normal and breath sounds normal. He has no wheezes.   Abdominal: Soft. Bowel sounds are normal. There is no tenderness.   Musculoskeletal: He exhibits no edema or tenderness.   Lymphadenopathy:     He has no cervical adenopathy.   Neurological: He is alert and oriented to person, place, and time. Coordination normal.   Skin: Skin is warm and dry. No rash noted.   Psychiatric: He has a normal mood and affect. Judgment and thought content normal.   Vitals reviewed.      LABS:  WBC   Date Value Ref Range Status   02/28/2019 4.47 3.90 - 12.70 K/uL Final     Hemoglobin   Date Value Ref Range Status   02/28/2019 12.4 (L) 14.0 - 18.0 g/dL Final     Hematocrit   Date Value Ref Range Status   02/28/2019 36.5 (L) 40.0 - 54.0 % Final     Platelets   Date Value Ref Range Status   02/28/2019 234 150 - 350 K/uL Final     Gran # (ANC)   Date Value Ref Range Status   02/28/2019 2.6 1.8 - 7.7 K/uL Final     Comment:     The ANC is based on a white cell differential from an   automated cell counter. It has not been microscopically   reviewed for the presence of abnormal cells. Clinical   correlation is  required.         Chemistry        Component Value Date/Time     02/28/2019 0955    K 4.2 02/28/2019 0955     02/28/2019 0955    CO2 26 02/28/2019 0955    BUN 21 (H) 02/28/2019 0955    CREATININE 1.1 02/28/2019 0955     02/28/2019 0955        Component Value Date/Time    CALCIUM 9.5 02/28/2019 0955    ALKPHOS 47 (L) 02/28/2019 0955    AST 21 02/28/2019 0955    ALT 15 02/28/2019 0955    BILITOT 0.6 02/28/2019 0955    ESTGFRAFRICA >60.0 02/28/2019 0955    EGFRNONAA >60.0 02/28/2019 0955        TSH   Date Value Ref Range Status   02/28/2019 1.685 0.400 - 4.000 uIU/mL Final     Free T4   Date Value Ref Range Status   02/28/2019 0.80 0.71 - 1.51 ng/dL Final       Assessment:       1. Malignant tumor of palatine tonsil        Plan:        1. Reviewed pET scan, Overall improved but needs to see Dr. Mccray. Will see him in follow-up in 6 months     Above care plan was discussed with patient and accompanying wife and all questions were addressed to their satisfaction

## 2019-02-28 NOTE — Clinical Note
Schedule to see Dr. Mccray next week for follow-up (any day but Friday)Schedule CBC,CMP, Mag and phos and see me in 6 months

## 2019-03-07 ENCOUNTER — TELEPHONE (OUTPATIENT)
Dept: SPEECH THERAPY | Facility: HOSPITAL | Age: 49
End: 2019-03-07

## 2019-03-07 ENCOUNTER — OFFICE VISIT (OUTPATIENT)
Dept: OTOLARYNGOLOGY | Facility: CLINIC | Age: 49
End: 2019-03-07
Payer: COMMERCIAL

## 2019-03-07 VITALS
DIASTOLIC BLOOD PRESSURE: 89 MMHG | SYSTOLIC BLOOD PRESSURE: 151 MMHG | HEART RATE: 71 BPM | BODY MASS INDEX: 30.95 KG/M2 | TEMPERATURE: 98 F | WEIGHT: 228.19 LBS

## 2019-03-07 DIAGNOSIS — C09.9 MALIGNANT TUMOR OF PALATINE TONSIL: Primary | ICD-10-CM

## 2019-03-07 DIAGNOSIS — I89.0 LYMPHEDEMA OF FACE: ICD-10-CM

## 2019-03-07 PROCEDURE — 99999 PR PBB SHADOW E&M-EST. PATIENT-LVL III: CPT | Mod: PBBFAC,,, | Performed by: OTOLARYNGOLOGY

## 2019-03-07 PROCEDURE — 99214 PR OFFICE/OUTPT VISIT, EST, LEVL IV, 30-39 MIN: ICD-10-PCS | Mod: 25,S$GLB,, | Performed by: OTOLARYNGOLOGY

## 2019-03-07 PROCEDURE — 3008F BODY MASS INDEX DOCD: CPT | Mod: CPTII,S$GLB,, | Performed by: OTOLARYNGOLOGY

## 2019-03-07 PROCEDURE — 31575 DIAGNOSTIC LARYNGOSCOPY: CPT | Mod: S$GLB,,, | Performed by: OTOLARYNGOLOGY

## 2019-03-07 PROCEDURE — 3008F PR BODY MASS INDEX (BMI) DOCUMENTED: ICD-10-PCS | Mod: CPTII,S$GLB,, | Performed by: OTOLARYNGOLOGY

## 2019-03-07 PROCEDURE — 99214 OFFICE O/P EST MOD 30 MIN: CPT | Mod: 25,S$GLB,, | Performed by: OTOLARYNGOLOGY

## 2019-03-07 PROCEDURE — 99999 PR PBB SHADOW E&M-EST. PATIENT-LVL III: ICD-10-PCS | Mod: PBBFAC,,, | Performed by: OTOLARYNGOLOGY

## 2019-03-07 PROCEDURE — 31575 PR LARYNGOSCOPY, FLEXIBLE; DIAGNOSTIC: ICD-10-PCS | Mod: S$GLB,,, | Performed by: OTOLARYNGOLOGY

## 2019-03-07 NOTE — ASSESSMENT & PLAN NOTE
Overall he looks good. He is making progress with his activity and his diet. His exam is improved, and there are appropriate post-therapy changes. The scan is also improved, although there is a comment about residual uptake in the tonsil. I do not appreciate a clinical correlate for this, and the uptake is only 3.36, which is not typically above the level of concern. I explained that, all in all, this is a good report, and his clinical picture, exam, and scans all point to continued improvement. I noted that the only way to get a definitive answer is to have tissue, which would require a radical tonsillectomy and maybe even a neck dissection - I do not have the evidence to recommend this now, and have instead recommended a repeat PET-CT in 3 months. We discussed how there will likely be continued improvement or stability of the scan, which will determine our need for additional images, but that if there is worsening, we will proceed with biopsy at that time. Reassurance was provided and all questions were answered to his apparent satisfaction.

## 2019-03-07 NOTE — PROGRESS NOTES
Subjective:       Patient ID: Frankie Hoyt is a 48 y.o. male.    Chief Complaint: Follow-up (Malignant tumor of palatine tonsil)       Malignant tumor of palatine tonsil    9/18/2018 Biopsy     FNA =   Supplemental Diagnosis  Cellblock confirms squamous carcinoma with necrosis  Immunostain for p16 is positive. The controls stain appropriately         9/19/2018 Initial Diagnosis     Malignant tumor of palatine tonsil         9/20/2018 Tumor Conference     His case was discussed at the Multidisciplinary Head and Neck Team Planning Meeting.    Representatives from Medical Oncology, Radiation Oncology, Head and Neck Surgical Oncology, Psychosocial Oncology, and Speech and Language Pathology discussed the case with the following recommendations:    1) diagnostic right tonsillectomy  2) chemoradiation                 9/22/2018 Tumor Markers     Patient's tumor was tested for the following markers: p16.                                              Results of the tumor marker test revealed positive         10/3/2018 Surgery     Right tonsillectomy         10/4/2018 Tumor Conference     His case was discussed at the Multidisciplinary Head and Neck Team Planning Meeting.    Representatives from Medical Oncology, Radiation Oncology, Head and Neck Surgical Oncology, Psychosocial Oncology, and Speech and Language Pathology discussed the case with the following recommendations:    1) Chemoradiation                 10/16/2018 - 11/27/2018 Radiation Therapy     Treating physician: Dr. Monte  Total Dose: 70 Gy  Fractions: 35/35         10/16/2018 - 12/1/2018 Chemotherapy     Treatment Summary   Plan Name: OP HEAD NECK CISPLATIN + RADIOTHERAPY  Treatment Goal: Curative  Status: Inactive  Start Date: [No treatment day found]  End Date: [No treatment day found]  Provider: Nat Castillo MD  Chemotherapy: CISplatin (PLATINOL) 100 mg/m2 = 245 mg in sodium chloride 0.9% 500 mL chemo infusion, 100 mg/m2 = 245 mg (original dose ),  "Intravenous, Clinic/HOD 1 time, 0 of 6 cycles  Dose modification: 100 mg/m2 (Cycle 1, Reason: Error Acknowledged)    Plan Name: OP HEAD NECK CISPLATIN Q3W  Treatment Goal: Curative  Status: Active  Start Date: 10/16/2018  End Date: 12/1/2018 (Planned)  Provider: Nat Castillo MD  Chemotherapy: CISplatin (PLATINOL) 100 mg/m2 = 245 mg in sodium chloride 0.9% 500 mL chemo infusion, 100 mg/m2 = 245 mg, Intravenous, Clinic/HOD 1 time, 3 of 3 cycles  Administration: 245 mg (10/16/2018), 245 mg (11/5/2018), 245 mg (11/26/2018)                HPI     Frankie Hoyt returns for his first post-treatment visit. He has no complaints, other than some soft swelling in the neck. He has no pain. He lost about 50 pounds during his course, but he feels good and is back to normal (almost). He is sensitive to black pepper and notes a mild dry mouth that is worse at night. The soft neck swelling comes and goes - it did not appear until after treatment ended. He has had a CT scan and has met with Dr. Castillo. His energy is "great" - he is going to the gym. He is eating "anything I want". He cannot taste sweets, and he notes that this was the last thing to go.    Past Medical History:   Diagnosis Date    Anxiety     Cancer of tonsil     left tonsil    Diverticular disease 2012       Past Surgical History:   Procedure Laterality Date    COLONOSCOPY  2014    colonscopy  2012    LARYNGOSCOPY N/A 10/3/2018    Performed by Serge Mccray MD at [facility] OR [address] [place] [address]    tonsil biopsy 2018      TONSILLECTOMY Right 10/3/2018    Performed by Serge Mccray MD at [facility] OR 1ST FLR       Current Outpatient Medications:     sildenafil (VIAGRA) 100 MG tablet, Take 1 tablet (100 mg total) by mouth daily as needed for Erectile Dysfunction., Disp: 6 tablet, Rfl: 1    INV  solution, Swish and spit 15 mLs 3 (three) times daily. Mix solution & -ZM/placebo vial. Shake for 30 secs. Rinse mouth for 30 secs three times daily then expectorate. Take " after meals. Don't eat, brush teeth or use mouthwash 15 mins prior/90 mins after. For Investigational Use Only. Protocol -YAKN-682. Patient Study #[id number]., Disp: 24 vial, Rfl: 0    INV -IP/placebo 2.6 X 10,11, CFU vial, Swish and spit 1 vial 3 (three) times daily. Mix solution & -YR/placebo vial. Shake for 30 secs. Rinse mouth for 30 secs three times daily then expectorate. Take after meals. Don't eat, brush teeth or use mouthwash 15 mins prior/90 mins after. For Investigational Use Only. Protocol: -YHRT-507. Patient Study #[id number]., Disp: 24 vial, Rfl: 0    Review of patient's allergies indicates:  No Known Allergies    Social History     Socioeconomic History    Marital status:      Spouse name: Not on file    Number of children: Not on file    Years of education: Not on file    Highest education level: Not on file   Social Needs    Financial resource strain: Not on file    Food insecurity - worry: Not on file    Food insecurity - inability: Not on file    Transportation needs - medical: Not on file    Transportation needs - non-medical: Not on file   Occupational History    Not on file   Tobacco Use    Smoking status: Former Smoker     Types: Cigars     Last attempt to quit: 2014     Years since quittin.1    Smokeless tobacco: Former User    Tobacco comment: 1 per weekend   Substance and Sexual Activity    Alcohol use: Yes     Alcohol/week: 7.2 oz     Types: 12 Cans of beer per week     Comment: weekend    Drug use: No    Sexual activity: Yes     Partners: Female   Other Topics Concern    Patient feels they ought to cut down on drinking/drug use Not Asked    Patient annoyed by others criticizing their drinking/drug use Not Asked    Patient has felt bad or guilty about drinking/drug use Not Asked    Patient has had a drink/used drugs as an eye opener in the AM Not Asked   Social History Narrative     Frankie Hoyt lives with his wife and 2 younger children in  [place].  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  His spouse is Kym.   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 Frankie Hoyt's hobbies include working out       Family History   Problem Relation Age of Onset    Cancer Father         colon and prostate cancer    Heart disease Father      Review of Systems   Constitutional: Negative for appetite change, chills, diaphoresis, fatigue, fever and unexpected weight change.   HENT: Negative for congestion, dental problem, drooling, ear discharge, ear pain, facial swelling, hearing loss, mouth sores, nosebleeds, postnasal drip, rhinorrhea, sinus pressure, sneezing, sore throat, tinnitus, trouble swallowing and voice change.    Eyes: Negative for pain, discharge, redness and itching.   Respiratory: Negative for cough and shortness of breath.    Cardiovascular: Negative for chest pain.   Gastrointestinal: Negative for abdominal distention, abdominal pain, diarrhea, nausea and vomiting.   Endocrine: Negative for cold intolerance and heat intolerance.   Genitourinary: Negative for difficulty urinating.   Musculoskeletal: Negative for neck pain and neck stiffness.   Skin: Negative for rash.   Neurological: Negative for dizziness, weakness and headaches.   Hematological: Negative for adenopathy.       Objective:      Physical Exam   Constitutional: He is oriented to person, place, and time. He appears well-developed and well-nourished. He is cooperative.   HENT:   Head: Normocephalic and atraumatic.   Right Ear: Hearing, tympanic membrane, external ear and ear canal normal.   Left Ear: Hearing, tympanic membrane, external ear and ear canal normal.   Nose: Nose normal. No rhinorrhea, nasal deformity or septal deviation. Right sinus exhibits no maxillary sinus tenderness and no frontal sinus tenderness. Left sinus exhibits no maxillary sinus tenderness and no  frontal sinus tenderness.   Mouth/Throat: Uvula is midline, oropharynx is clear and moist and mucous membranes are normal. He does not have dentures. No oral lesions. No trismus in the jaw. No oropharyngeal exudate or posterior oropharyngeal edema.       Procedure: Flexible laryngoscopy  In order to fully examine the upper aerodigestive tract, including the larynx, in a patient with a hyperactive gag reflex, flexible endoscopy is required.  After explaining the procedure and obtaining verbal consent, a timeout was performed with the patient's participation according to the universal protocol. Both nasal cavities were anesthetized with 4% Xylocaine spray mixed with Salvatore-Synephrine. The flexible laryngoscope (#[id number]) was inserted into the nasal cavity and advanced to visualize the nasal cavity, nasopharynx, the posterior oropharynx, hypopharynx, and the endolarynx with the above findings noted. The scope was removed and the procedure terminated. The patient tolerated this procedure well without apparent complication.      FINDINGS  Nasopharynx - the torus is clear. There are no lesions of the posterior wall.   Oropharynx - no lesions of the tongue base. There is no obvious fullness or asymmetry. There is some blunting of the leftward vallecula and appropriate edema of the left AE fold  Hypopharynx - there are no lesions of the pyriform sinuses or postcricoid region    Larynx - there are no lesions of the supraglottic or glottic larynx. Vocal fold mobility is normal with complete closure.      Eyes: Conjunctivae and EOM are normal. Pupils are equal, round, and reactive to light. Right eye exhibits no chemosis. Left eye exhibits no chemosis.   Neck: Trachea normal, normal range of motion and phonation normal. Neck supple. No JVD present. No tracheal tenderness present. No tracheal deviation and no edema present. No thyroid mass and no thyromegaly present.       Salivary glands - there are no lesions, and there is no  asymmetry of the parotid and submandibular glands   Cardiovascular: Normal rate, regular rhythm and intact distal pulses.   Pulmonary/Chest: Effort normal. No accessory muscle usage or stridor. No tachypnea. No respiratory distress.   Abdominal: Normal appearance. He exhibits no distension. There is no guarding.   Lymphadenopathy:     He has cervical adenopathy.        Right cervical: No deep cervical and no posterior cervical adenopathy present.       Left cervical: Deep cervical adenopathy present. No posterior cervical adenopathy present.        Right: No supraclavicular adenopathy present.        Left: No supraclavicular adenopathy present.   Persistent fullness, but much smaller, in level II   Neurological: He is alert and oriented to person, place, and time. No cranial nerve deficit. Gait normal.   Skin: Skin is warm and dry. No lesion noted.   Psychiatric: He has a normal mood and affect. His speech is normal.   Vitals reviewed.      CT Neck 1/7/2019:  Impression       1. Decreased size of patient's left neck mass at the level of the mandible with the changes in the adjacent soft tissues reflecting radiation change.  2. Mild fullness of the left palatine tonsil.  3. No significant lymphadenopathy within the neck or chest.  4. Small volume fluid along the left vallecula, posterior to the hyoid and extending to the expected region of the left piriform sinus.  Finding is nonspecific however may represent post therapy changes.  Recommend clinical correlation.  5. Nonvisualization of the cranial aspect of the left jugular vein suggesting thrombosis with collateral vessel formation.  6. 0.4 cm solid pulmonary nodule within the right lower lobe, unchanged in comparison to 02/03/2017.     PET-CT 2/26/2019:  Impression       Generalized improvement neoplastic hypermetabolism with minimal residual left lingular tonsil hypermetabolism which may relate to posttherapy inflammatory change, noting some element of residual  neoplastic disease is not ruled out.  Direct visualization could be performed if clinically warranted for further evaluation.     Assessment and Plan:       Problem List Items Addressed This Visit     Malignant tumor of palatine tonsil - Primary     Overall he looks good. He is making progress with his activity and his diet. His exam is improved, and there are appropriate post-therapy changes. The scan is also improved, although there is a comment about residual uptake in the tonsil. I do not appreciate a clinical correlate for this, and the uptake is only 3.36, which is not typically above the level of concern. I explained that, all in all, this is a good report, and his clinical picture, exam, and scans all point to continued improvement. I noted that the only way to get a definitive answer is to have tissue, which would require a radical tonsillectomy and maybe even a neck dissection - I do not have the evidence to recommend this now, and have instead recommended a repeat PET-CT in 3 months. We discussed how there will likely be continued improvement or stability of the scan, which will determine our need for additional images, but that if there is worsening, we will proceed with biopsy at that time. Reassurance was provided and all questions were answered to his apparent satisfaction.          Relevant Orders    SLP evaluation    NM PET CT Routine Skull to Mid Thigh    Lymphedema of face     Will refer to [place] in SLP.

## 2019-05-02 RX ORDER — SILDENAFIL 100 MG/1
TABLET, FILM COATED ORAL
Qty: 6 TABLET | Refills: 1 | Status: SHIPPED | OUTPATIENT
Start: 2019-05-02 | End: 2019-07-28 | Stop reason: SDUPTHER

## 2019-05-20 ENCOUNTER — TELEPHONE (OUTPATIENT)
Dept: FAMILY MEDICINE | Facility: CLINIC | Age: 49
End: 2019-05-20

## 2019-05-20 RX ORDER — CIPROFLOXACIN 500 MG/1
500 TABLET ORAL 2 TIMES DAILY
Qty: 14 TABLET | Refills: 0 | Status: SHIPPED | OUTPATIENT
Start: 2019-05-20 | End: 2019-09-04

## 2019-05-20 NOTE — TELEPHONE ENCOUNTER
----- Message from Yoli Huynh sent at 5/20/2019  8:26 AM CDT -----  Contact: [phone]/self  Patient requesting to speak with you regarding getting medication for Diverticulitis. Please call.    I spoke with the pt   He started with a discomfort this am  Pain started with BM in abd - center of abd just below his belly button  No fever  duran had sent in abx last time this started - can you do that? In 2012 he had a full blown attack and was in the hospital but he is hoping that he is catching it early   He does not need pain meds just the abx    [facility]

## 2019-06-10 ENCOUNTER — PATIENT MESSAGE (OUTPATIENT)
Dept: OTOLARYNGOLOGY | Facility: CLINIC | Age: 49
End: 2019-06-10

## 2019-06-11 ENCOUNTER — HOSPITAL ENCOUNTER (OUTPATIENT)
Dept: RADIOLOGY | Facility: HOSPITAL | Age: 49
Discharge: HOME | End: 2019-06-11
Attending: OTOLARYNGOLOGY
Payer: COMMERCIAL

## 2019-06-11 ENCOUNTER — PATIENT MESSAGE (OUTPATIENT)
Dept: OTOLARYNGOLOGY | Facility: CLINIC | Age: 49
End: 2019-06-11

## 2019-06-11 DIAGNOSIS — C09.9 MALIGNANT TUMOR OF PALATINE TONSIL: ICD-10-CM

## 2019-06-11 LAB — POCT GLUCOSE: 107 MG/DL (ref 70–110)

## 2019-06-11 PROCEDURE — 78815 PET IMAGE W/CT SKULL-THIGH: CPT | Mod: 26,PS,, | Performed by: RADIOLOGY

## 2019-06-11 PROCEDURE — 78815 NM PET CT ROUTINE: ICD-10-PCS | Mod: 26,PS,, | Performed by: RADIOLOGY

## 2019-06-11 PROCEDURE — 78815 PET IMAGE W/CT SKULL-THIGH: CPT | Mod: TC

## 2019-06-11 PROCEDURE — A9552 F18 FDG: HCPCS

## 2019-06-13 ENCOUNTER — OFFICE VISIT (OUTPATIENT)
Dept: OTOLARYNGOLOGY | Facility: CLINIC | Age: 49
End: 2019-06-13
Payer: COMMERCIAL

## 2019-06-13 VITALS
SYSTOLIC BLOOD PRESSURE: 125 MMHG | WEIGHT: 227.75 LBS | DIASTOLIC BLOOD PRESSURE: 66 MMHG | TEMPERATURE: 99 F | HEART RATE: 71 BPM | BODY MASS INDEX: 30.89 KG/M2

## 2019-06-13 DIAGNOSIS — C09.9 MALIGNANT TUMOR OF PALATINE TONSIL: Primary | ICD-10-CM

## 2019-06-13 PROCEDURE — 3008F PR BODY MASS INDEX (BMI) DOCUMENTED: ICD-10-PCS | Mod: CPTII,S$GLB,, | Performed by: OTOLARYNGOLOGY

## 2019-06-13 PROCEDURE — 99999 PR PBB SHADOW E&M-EST. PATIENT-LVL III: ICD-10-PCS | Mod: PBBFAC,,, | Performed by: OTOLARYNGOLOGY

## 2019-06-13 PROCEDURE — 99999 PR PBB SHADOW E&M-EST. PATIENT-LVL III: CPT | Mod: PBBFAC,,, | Performed by: OTOLARYNGOLOGY

## 2019-06-13 PROCEDURE — 3008F BODY MASS INDEX DOCD: CPT | Mod: CPTII,S$GLB,, | Performed by: OTOLARYNGOLOGY

## 2019-06-13 PROCEDURE — 31575 DIAGNOSTIC LARYNGOSCOPY: CPT | Mod: S$GLB,,, | Performed by: OTOLARYNGOLOGY

## 2019-06-13 PROCEDURE — 99213 PR OFFICE/OUTPT VISIT, EST, LEVL III, 20-29 MIN: ICD-10-PCS | Mod: 25,S$GLB,, | Performed by: OTOLARYNGOLOGY

## 2019-06-13 PROCEDURE — 99213 OFFICE O/P EST LOW 20 MIN: CPT | Mod: 25,S$GLB,, | Performed by: OTOLARYNGOLOGY

## 2019-06-13 PROCEDURE — 31575 PR LARYNGOSCOPY, FLEXIBLE; DIAGNOSTIC: ICD-10-PCS | Mod: S$GLB,,, | Performed by: OTOLARYNGOLOGY

## 2019-06-13 NOTE — PROGRESS NOTES
[facility]    Subjective:       Patient ID: Frankie Hoyt is a 48 y.o. male.    Chief Complaint: Follow-up (Malignant tumor of palatine tonsil)       Malignant tumor of palatine tonsil    9/18/2018 Biopsy     FNA =   Supplemental Diagnosis  Cellblock confirms squamous carcinoma with necrosis  Immunostain for p16 is positive. The controls stain appropriately         9/19/2018 Initial Diagnosis     Malignant tumor of palatine tonsil         9/20/2018 Tumor Conference     His case was discussed at the Multidisciplinary Head and Neck Team Planning Meeting.    Representatives from Medical Oncology, Radiation Oncology, Head and Neck Surgical Oncology, Psychosocial Oncology, and Speech and Language Pathology discussed the case with the following recommendations:    1) diagnostic right tonsillectomy  2) chemoradiation                 9/22/2018 Tumor Markers     Patient's tumor was tested for the following markers: p16.                                              Results of the tumor marker test revealed positive         10/3/2018 Surgery     Right tonsillectomy         10/4/2018 Tumor Conference     His case was discussed at the Multidisciplinary Head and Neck Team Planning Meeting.    Representatives from Medical Oncology, Radiation Oncology, Head and Neck Surgical Oncology, Psychosocial Oncology, and Speech and Language Pathology discussed the case with the following recommendations:    1) Chemoradiation                 10/16/2018 - 11/27/2018 Radiation Therapy     Treating physician: Dr. Monte  Total Dose: 70 Gy  Fractions: 35/35         10/16/2018 - 12/1/2018 Chemotherapy     Treatment Summary   Plan Name: OP HEAD NECK CISPLATIN + RADIOTHERAPY  Treatment Goal: Curative  Status: Inactive  Start Date: [No treatment day found]  End Date: [No treatment day found]  Provider: Nat Castillo MD  Chemotherapy: CISplatin (PLATINOL) 100 mg/m2 = 245 mg in sodium chloride 0.9% 500 mL chemo infusion,  "100 mg/m2 = 245 mg (original dose ), Intravenous, Clinic/HOD 1 time, 0 of 6 cycles  Dose modification: 100 mg/m2 (Cycle 1, Reason: Error Acknowledged)    Plan Name: OP HEAD NECK CISPLATIN Q3W  Treatment Goal: Curative  Status: Active  Start Date: 10/16/2018  End Date: 12/1/2018 (Planned)  Provider: Nat Castillo MD  Chemotherapy: CISplatin (PLATINOL) 100 mg/m2 = 245 mg in sodium chloride 0.9% 500 mL chemo infusion, 100 mg/m2 = 245 mg, Intravenous, Clinic/HOD 1 time, 3 of 3 cycles  Administration: 245 mg (10/16/2018), 245 mg (11/5/2018), 245 mg (11/26/2018)                CALIN Hoyt returns for another visit. He has no complaints, other than some soft swelling in the neck because of the lymphedema - this is worse in the morning and after alcohol. He has no pain. He lost about 50 pounds during his course, but he feels good and is back to normal (almost). He thinks he can taste spicy food better now. The soft neck swelling comes and goes - it did not appear until after treatment ended. He feels "better than I have in 10 years" and ran the [employer]. He denies dysphagia, odynophagia, throat pain, and otalgia. His voice is normal. There is no hemoptysis or hematemesis. He is breathing well.      Past Medical History:   Diagnosis Date    Anxiety     Cancer of tonsil     left tonsil    Diverticular disease 2012       Past Surgical History:   Procedure Laterality Date    COLONOSCOPY  2014    colonscopy  2012    LARYNGOSCOPY N/A 10/3/2018    Performed by Serge Mccray [facility] at [facility] OR [facility]ST F[facility]    tonsil biopsy 2018      TONSILLECTOMY Right 10/3/2018    Performed by Serge Mccray MD at [facility] OR 1ST FLR       Current Outpatient Medications:     ciprofloxacin HCl (CIPRO) 500 MG tablet, Take 1 tablet (500 mg total) by mouth 2 (two) times daily., Disp: 14 tablet, Rfl: 0    INV  solution, Swish and spit 15 mLs 3 (three) times daily. Mix solution & -EQ/placebo vial. Shake for 30 secs. " Rinse mouth for 30 secs three times daily then expectorate. Take after meals. Don't eat, brush teeth or use mouthwash 15 mins prior/90 mins after. For Investigational Use Only. Protocol -QEJD-299. Patient Study #[id number]., Disp: 24 vial, Rfl: 0    INV -WD/placebo 2.6 X 10,11, CFU vial, Swish and spit 1 vial 3 (three) times daily. Mix solution & -RO/placebo vial. Shake for 30 secs. Rinse mouth for 30 secs three times daily then expectorate. Take after meals. Don't eat, brush teeth or use mouthwash 15 mins prior/90 mins after. For Investigational Use Only. Protocol: -QYJS-078. Patient Study #[id number]., Disp: 24 vial, Rfl: 0    sildenafil (VIAGRA) 100 MG tablet, TAKE 1 TABLET BY MOUTH EVERY DAY AS NEEDED, Disp: 6 tablet, Rfl: 1    Review of patient's allergies indicates:  No Known Allergies    Social History     Socioeconomic History    Marital status:      Spouse name: Not on file    Number of children: Not on file    Years of education: Not on file    Highest education level: Not on file   Occupational History    Not on file   Social Needs    Financial resource strain: Not on file    Food insecurity:     Worry: Not on file     Inability: Not on file    Transportation needs:     Medical: Not on file     Non-medical: Not on file   Tobacco Use    Smoking status: Former Smoker     Types: Cigars     Last attempt to quit:      Years since quittin.4    Smokeless tobacco: Former User    Tobacco comment: 1 per weekend   Substance and Sexual Activity    Alcohol use: Yes     Alcohol/week: 7.2 oz     Types: 12 Cans of beer per week     Comment: weekend    Drug use: No    Sexual activity: Yes     Partners: Female   Lifestyle    Physical activity:     Days per week: Not on file     Minutes per session: Not on file    Stress: Not on file   Relationships    Social connections:     Talks on phone: Not on file     Gets together: Not on file     Attends Cheondoism service: Not on  file     Active member of club or organization: Not on file     Attends meetings of clubs or organizations: Not on file     Relationship status: Not on file   Other Topics Concern    Patient feels they ought to cut down on drinking/drug use Not Asked    Patient annoyed by others criticizing their drinking/drug use Not Asked    Patient has felt bad or guilty about drinking/drug use Not Asked    Patient has had a drink/used drugs as an eye opener in the AM Not Asked   Social History Narrative     Frankie Hoyt lives with his wife and 2 younger children in [place], [place].  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  His spouse is Kym.   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 Frankie Hoyt's hobbies include working out       Family History   Problem Relation Age of Onset    Cancer Father         colon and prostate cancer    Heart disease Father      Review of Systems   Constitutional: Negative for appetite change, chills, diaphoresis, fatigue, fever and unexpected weight change.   HENT: Negative for congestion, dental problem, drooling, ear discharge, ear pain, facial swelling, hearing loss, mouth sores, nosebleeds, postnasal drip, rhinorrhea, sinus pressure, sneezing, sore throat, tinnitus, trouble swallowing and voice change.    Eyes: Negative for pain, discharge, redness and itching.   Respiratory: Negative for cough and shortness of breath.    Cardiovascular: Negative for chest pain.   Gastrointestinal: Negative for abdominal distention, abdominal pain, diarrhea, nausea and vomiting.   Endocrine: Negative for cold intolerance and heat intolerance.   Genitourinary: Negative for difficulty urinating.   Musculoskeletal: Negative for neck pain and neck stiffness.   Skin: Negative for rash.   Neurological: Negative for dizziness, weakness and headaches.   Hematological: Negative for adenopathy.        Objective:      Physical Exam   Constitutional: He is oriented to person, place, and time. He appears well-developed and well-nourished. He is cooperative.   HENT:   Head: Normocephalic and atraumatic.   Right Ear: Hearing, tympanic membrane, external ear and ear canal normal.   Left Ear: Hearing, tympanic membrane, external ear and ear canal normal.   Nose: Nose normal. No rhinorrhea, nasal deformity or septal deviation. Right sinus exhibits no maxillary sinus tenderness and no frontal sinus tenderness. Left sinus exhibits no maxillary sinus tenderness and no frontal sinus tenderness.   Mouth/Throat: Uvula is midline, oropharynx is clear and moist and mucous membranes are normal. He does not have dentures. No oral lesions. No trismus in the jaw. No oropharyngeal exudate or posterior oropharyngeal edema.       Procedure: Flexible laryngoscopy  In order to fully examine the upper aerodigestive tract, including the larynx, in a patient with a hyperactive gag reflex, flexible endoscopy is required.  After explaining the procedure and obtaining verbal consent, a timeout was performed with the patient's participation according to the universal protocol. Both nasal cavities were anesthetized with 4% Xylocaine spray mixed with Salvatore-Synephrine. The flexible laryngoscope (#[id number]) was inserted into the nasal cavity and advanced to visualize the nasal cavity, nasopharynx, the posterior oropharynx, hypopharynx, and the endolarynx with the above findings noted. The scope was removed and the procedure terminated. The patient tolerated this procedure well without apparent complication.      FINDINGS  Nasopharynx - the torus is clear. There are no lesions of the posterior wall.   Oropharynx - no lesions of the tongue base. There is no obvious fullness or asymmetry. There is some blunting of the leftward vallecula and appropriate edema of the left AE fold  Hypopharynx - there are no lesions of the pyriform sinuses or postcricoid  region    Larynx - there are no lesions of the supraglottic or glottic larynx. Vocal fold mobility is normal with complete closure.      Eyes: Pupils are equal, round, and reactive to light. Conjunctivae and EOM are normal. Right eye exhibits no chemosis. Left eye exhibits no chemosis.   Neck: Trachea normal, normal range of motion and phonation normal. Neck supple. No JVD present. No tracheal tenderness present. No tracheal deviation and no edema present. No thyroid mass and no thyromegaly present.       Salivary glands - there are no lesions, and there is no asymmetry of the parotid and submandibular glands   Cardiovascular: Normal rate, regular rhythm and intact distal pulses.   Pulmonary/Chest: Effort normal. No accessory muscle usage or stridor. No tachypnea. No respiratory distress.   Abdominal: Normal appearance. He exhibits no distension. There is no guarding.   Lymphadenopathy:     He has no cervical adenopathy.        Right cervical: No deep cervical and no posterior cervical adenopathy present.       Left cervical: No posterior cervical adenopathy present.        Right: No supraclavicular adenopathy present.        Left: No supraclavicular adenopathy present.   Persistent fullness, but much smaller, in level II   Neurological: He is alert and oriented to person, place, and time. No cranial nerve deficit. Gait normal.   Skin: Skin is warm and dry. No lesion noted.   Psychiatric: He has a normal mood and affect. His speech is normal.   Vitals reviewed.      PET-CT 6/11/2019:  FINDINGS:    Impression       Prior mild hypermetabolic activity at the left base of the tongue in the region of the lingual tonsil has essentially resolved, with SUV max near background. Low level activity persists in the region of the matted soft tissue in the left lateral 2 ivan station.  Findings are suggestive of post treatment etiology, with residual component of malignancy possible though thought less likely.    No other  abnormal hypermetabolic foci are present.     Assessment and Plan:       Problem List Items Addressed This Visit     Malignant tumor of palatine tonsil - Primary     Overall he looks good. He is making progress with his activity and his diet. His exam is improved, and there are appropriate post-therapy changes. His scans continue to improve, so we will not obtain routine follow-ups, relying instead on his symptoms. If there is any symptom change, then we will appropriately evaluate it.     I would like to see him back in about 3 months, sooner with changes or questions. The patient was made aware of the symptoms of head and neck cancer -  including, but not limited to, swelling in the neck, changes in voice and swallowing, and pain -- the patient was instructed to contact me if any of these symptoms appear and persist for more than 3-4 weeks.  Time was allowed for questions, and all questions were answered to the patient's apparent satisfaction.

## 2019-06-25 NOTE — ASSESSMENT & PLAN NOTE
Overall he looks good. He is making progress with his activity and his diet. His exam is improved, and there are appropriate post-therapy changes. His scans continue to improve, so we will not obtain routine follow-ups, relying instead on his symptoms. If there is any symptom change, then we will appropriately evaluate it.     I would like to see him back in about 3 months, sooner with changes or questions. The patient was made aware of the symptoms of head and neck cancer -  including, but not limited to, swelling in the neck, changes in voice and swallowing, and pain -- the patient was instructed to contact me if any of these symptoms appear and persist for more than 3-4 weeks.  Time was allowed for questions, and all questions were answered to the patient's apparent satisfaction.

## 2019-07-28 RX ORDER — SILDENAFIL 100 MG/1
TABLET, FILM COATED ORAL
Qty: 6 TABLET | Refills: 1 | Status: SHIPPED | OUTPATIENT
Start: 2019-07-28 | End: 2019-11-16 | Stop reason: SDUPTHER

## 2019-09-04 ENCOUNTER — LAB VISIT (OUTPATIENT)
Dept: LAB | Facility: HOSPITAL | Age: 49
End: 2019-09-04
Attending: INTERNAL MEDICINE
Payer: COMMERCIAL

## 2019-09-04 ENCOUNTER — OFFICE VISIT (OUTPATIENT)
Dept: HEMATOLOGY/ONCOLOGY | Facility: CLINIC | Age: 49
End: 2019-09-04
Payer: COMMERCIAL

## 2019-09-04 VITALS
WEIGHT: 235.25 LBS | OXYGEN SATURATION: 99 % | HEART RATE: 63 BPM | DIASTOLIC BLOOD PRESSURE: 74 MMHG | RESPIRATION RATE: 17 BRPM | TEMPERATURE: 98 F | SYSTOLIC BLOOD PRESSURE: 130 MMHG | HEIGHT: 72 IN | BODY MASS INDEX: 31.86 KG/M2

## 2019-09-04 DIAGNOSIS — I89.0 LYMPHEDEMA OF FACE: ICD-10-CM

## 2019-09-04 DIAGNOSIS — Z85.810 HISTORY OF CANCER OF LINGUAL TONSIL: Primary | ICD-10-CM

## 2019-09-04 DIAGNOSIS — C09.9 MALIGNANT TUMOR OF PALATINE TONSIL: ICD-10-CM

## 2019-09-04 LAB
ALBUMIN SERPL BCP-MCNC: 4.1 G/DL (ref 3.5–5.2)
ALP SERPL-CCNC: 47 U/L (ref 55–135)
ALT SERPL W/O P-5'-P-CCNC: 24 U/L (ref 10–44)
ANION GAP SERPL CALC-SCNC: 9 MMOL/L (ref 8–16)
AST SERPL-CCNC: 32 U/L (ref 10–40)
BILIRUB SERPL-MCNC: 0.7 MG/DL (ref 0.1–1)
BUN SERPL-MCNC: 29 MG/DL (ref 6–20)
CALCIUM SERPL-MCNC: 9.6 MG/DL (ref 8.7–10.5)
CHLORIDE SERPL-SCNC: 107 MMOL/L (ref 95–110)
CO2 SERPL-SCNC: 24 MMOL/L (ref 23–29)
CREAT SERPL-MCNC: 1.2 MG/DL (ref 0.5–1.4)
ERYTHROCYTE [DISTWIDTH] IN BLOOD BY AUTOMATED COUNT: 12.7 % (ref 11.5–14.5)
EST. GFR  (AFRICAN AMERICAN): >60 ML/MIN/1.73 M^2
EST. GFR  (NON AFRICAN AMERICAN): >60 ML/MIN/1.73 M^2
GLUCOSE SERPL-MCNC: 97 MG/DL (ref 70–110)
HCT VFR BLD AUTO: 39.6 % (ref 40–54)
HGB BLD-MCNC: 13.3 G/DL (ref 14–18)
IMM GRANULOCYTES # BLD AUTO: 0.05 K/UL (ref 0–0.04)
MAGNESIUM SERPL-MCNC: 2 MG/DL (ref 1.6–2.6)
MCH RBC QN AUTO: 30.9 PG (ref 27–31)
MCHC RBC AUTO-ENTMCNC: 33.6 G/DL (ref 32–36)
MCV RBC AUTO: 92 FL (ref 82–98)
NEUTROPHILS # BLD AUTO: 2.7 K/UL (ref 1.8–7.7)
PHOSPHATE SERPL-MCNC: 3.5 MG/DL (ref 2.7–4.5)
PLATELET # BLD AUTO: 222 K/UL (ref 150–350)
PMV BLD AUTO: 8.9 FL (ref 9.2–12.9)
POTASSIUM SERPL-SCNC: 4.6 MMOL/L (ref 3.5–5.1)
PROT SERPL-MCNC: 7.1 G/DL (ref 6–8.4)
RBC # BLD AUTO: 4.31 M/UL (ref 4.6–6.2)
SODIUM SERPL-SCNC: 140 MMOL/L (ref 136–145)
T4 FREE SERPL-MCNC: 0.75 NG/DL (ref 0.71–1.51)
TSH SERPL DL<=0.005 MIU/L-ACNC: 2.51 UIU/ML (ref 0.4–4)
WBC # BLD AUTO: 4.75 K/UL (ref 3.9–12.7)

## 2019-09-04 PROCEDURE — 3008F PR BODY MASS INDEX (BMI) DOCUMENTED: ICD-10-PCS | Mod: CPTII,S$GLB,, | Performed by: INTERNAL MEDICINE

## 2019-09-04 PROCEDURE — 80053 COMPREHEN METABOLIC PANEL: CPT

## 2019-09-04 PROCEDURE — 83735 ASSAY OF MAGNESIUM: CPT

## 2019-09-04 PROCEDURE — 99213 PR OFFICE/OUTPT VISIT, EST, LEVL III, 20-29 MIN: ICD-10-PCS | Mod: S$GLB,,, | Performed by: INTERNAL MEDICINE

## 2019-09-04 PROCEDURE — 84443 ASSAY THYROID STIM HORMONE: CPT

## 2019-09-04 PROCEDURE — 84100 ASSAY OF PHOSPHORUS: CPT

## 2019-09-04 PROCEDURE — 99999 PR PBB SHADOW E&M-EST. PATIENT-LVL III: CPT | Mod: PBBFAC,,, | Performed by: INTERNAL MEDICINE

## 2019-09-04 PROCEDURE — 85027 COMPLETE CBC AUTOMATED: CPT

## 2019-09-04 PROCEDURE — 3008F BODY MASS INDEX DOCD: CPT | Mod: CPTII,S$GLB,, | Performed by: INTERNAL MEDICINE

## 2019-09-04 PROCEDURE — 36415 COLL VENOUS BLD VENIPUNCTURE: CPT

## 2019-09-04 PROCEDURE — 84439 ASSAY OF FREE THYROXINE: CPT

## 2019-09-04 PROCEDURE — 99999 PR PBB SHADOW E&M-EST. PATIENT-LVL III: ICD-10-PCS | Mod: PBBFAC,,, | Performed by: INTERNAL MEDICINE

## 2019-09-04 PROCEDURE — 99213 OFFICE O/P EST LOW 20 MIN: CPT | Mod: S$GLB,,, | Performed by: INTERNAL MEDICINE

## 2019-09-04 NOTE — PROGRESS NOTES
Subjective:       Patient ID: Frankie Hoyt is a 49 y.o. male.    Chief Complaint: Malignant tumor of palatine tonsil  ONCOLOGIC HISTORY: Mr. Frankie Hoyt is a 49 year-old smoker and tobacco chewer who had waxing and waning neck mass for the last 18 months.  He underwent a CT scan in February 2017 that was read as negative.  He noticed that his left cheek swelling really got worse in July 2018.  He was seen in Urgent Care and received antibiotics without any improvement.  Then he saw Dr. Franks and underwent FNA of the left neck, which was positive for malignant cells.  She also noted left tonsillar asymmetry.  CT neck at this time showed a mass in the left neck at the level of the angle of the mandible, which represented an   enlarged lymph node measuring 3.9 x 2.4 x 5.2 cm, mass demonstrated irregular margins in keeping with extracapsular extension.  There was encasement of the external carotid artery and internal jugular vein with less than 180-degree abutment of the internal and distal common carotid artery.  There was prominence of the left palatine tonsil as well as fullness of the left vallecula.  He was seen by Dr. Mccray and underwent a PET scan, which showed a hypermetabolic left cervical mass and bilateral palatine tonsils.  There was a low level uptake in the borderline right-sided cervical lymph node and no evidence of distant   Metastasis        He completed cycle 3 of chemo/RT with Cisplatin as of 12/4/18    HPIHe comes in to review his labs. He denies any trouble swallowing  He denies any nausea, vomiting, diarrhea, constipation, abdominal pain, weight loss or loss of appetite, chest pain, shortness of breath, leg swelling, fatigue, pain, headache, dizziness, or mood changes. His ECOG PS is zero. He is by himself        Review of Systems   Constitutional: Negative for appetite change, fatigue and unexpected weight change.   HENT: Negative for mouth sores.    Eyes: Negative for visual disturbance.    Respiratory: Negative for cough and shortness of breath.    Cardiovascular: Negative for chest pain.   Gastrointestinal: Negative for abdominal pain and diarrhea.   Genitourinary: Negative for frequency.   Musculoskeletal: Negative for back pain.   Skin: Negative for rash.   Neurological: Negative for headaches.   Hematological: Negative for adenopathy.   Psychiatric/Behavioral: The patient is not nervous/anxious.    All other systems reviewed and are negative.      Objective:      Physical Exam   Constitutional: He is oriented to person, place, and time. He appears well-developed and well-nourished.   HENT:   Mouth/Throat: No oropharyngeal exudate.   Cardiovascular: Normal rate and normal heart sounds.   Pulmonary/Chest: Effort normal and breath sounds normal. He has no wheezes.   Abdominal: Soft. Bowel sounds are normal. There is no tenderness.   Musculoskeletal: He exhibits no edema or tenderness.   Lymphadenopathy:     He has no cervical adenopathy.   Neurological: He is alert and oriented to person, place, and time. Coordination normal.   Skin: Skin is warm and dry. No rash noted.   Psychiatric: He has a normal mood and affect. Judgment and thought content normal.   Vitals reviewed.      LABS:  WBC   Date Value Ref Range Status   09/04/2019 4.75 3.90 - 12.70 K/uL Final     Hemoglobin   Date Value Ref Range Status   09/04/2019 13.3 (L) 14.0 - 18.0 g/dL Final     Hematocrit   Date Value Ref Range Status   09/04/2019 39.6 (L) 40.0 - 54.0 % Final     Platelets   Date Value Ref Range Status   09/04/2019 222 150 - 350 K/uL Final     Gran # (ANC)   Date Value Ref Range Status   09/04/2019 2.7 1.8 - 7.7 K/uL Final     Comment:     The ANC is based on a white cell differential from an   automated cell counter. It has not been microscopically   reviewed for the presence of abnormal cells. Clinical   correlation is required.         Chemistry        Component Value Date/Time     09/04/2019 1035    K 4.6 09/04/2019  1035     09/04/2019 1035    CO2 24 09/04/2019 1035    BUN 29 (H) 09/04/2019 1035    CREATININE 1.2 09/04/2019 1035    GLU 97 09/04/2019 1035        Component Value Date/Time    CALCIUM 9.6 09/04/2019 1035    ALKPHOS 47 (L) 09/04/2019 1035    AST 32 09/04/2019 1035    ALT 24 09/04/2019 1035    BILITOT 0.7 09/04/2019 1035    ESTGFRAFRICA >60.0 09/04/2019 1035    EGFRNONAA >60.0 09/04/2019 1035           TSH   Date Value Ref Range Status   02/28/2019 1.685 0.400 - 4.000 uIU/mL Final     Free T4   Date Value Ref Range Status   02/28/2019 0.80 0.71 - 1.51 ng/dL Final       Assessment:       1. History of cancer of lingual tonsil    2. Lymphedema of face        Plan:        1,2. He is doing well clinically with no evidence of recurrence and will return in 1 year with labs.  He will see Dr. Mccray every 3 months    Above care plan was discussed with patient  and all questions were addressed to his satisfaction

## 2019-09-12 ENCOUNTER — HOSPITAL ENCOUNTER (OUTPATIENT)
Dept: RADIOLOGY | Facility: HOSPITAL | Age: 49
Discharge: HOME | End: 2019-09-12
Attending: OTOLARYNGOLOGY
Payer: COMMERCIAL

## 2019-09-12 ENCOUNTER — OFFICE VISIT (OUTPATIENT)
Dept: OTOLARYNGOLOGY | Facility: CLINIC | Age: 49
End: 2019-09-12
Payer: COMMERCIAL

## 2019-09-12 VITALS
HEART RATE: 64 BPM | WEIGHT: 231.5 LBS | DIASTOLIC BLOOD PRESSURE: 82 MMHG | SYSTOLIC BLOOD PRESSURE: 148 MMHG | BODY MASS INDEX: 31.39 KG/M2

## 2019-09-12 DIAGNOSIS — Z85.810 HISTORY OF CANCER OF LINGUAL TONSIL: Primary | ICD-10-CM

## 2019-09-12 DIAGNOSIS — Z85.810 HISTORY OF CANCER OF LINGUAL TONSIL: ICD-10-CM

## 2019-09-12 PROCEDURE — 70491 CT SOFT TISSUE NECK WITH CONTRAST: ICD-10-PCS | Mod: 26,,, | Performed by: RADIOLOGY

## 2019-09-12 PROCEDURE — 70491 CT SOFT TISSUE NECK W/DYE: CPT | Mod: TC

## 2019-09-12 PROCEDURE — 70491 CT SOFT TISSUE NECK W/DYE: CPT | Mod: 26,,, | Performed by: RADIOLOGY

## 2019-09-12 PROCEDURE — 99999 PR PBB SHADOW E&M-EST. PATIENT-LVL III: ICD-10-PCS | Mod: PBBFAC,,, | Performed by: OTOLARYNGOLOGY

## 2019-09-12 PROCEDURE — 99999 PR PBB SHADOW E&M-EST. PATIENT-LVL III: CPT | Mod: PBBFAC,,, | Performed by: OTOLARYNGOLOGY

## 2019-09-12 PROCEDURE — 99213 PR OFFICE/OUTPT VISIT, EST, LEVL III, 20-29 MIN: ICD-10-PCS | Mod: 25,S$GLB,, | Performed by: OTOLARYNGOLOGY

## 2019-09-12 PROCEDURE — 99213 OFFICE O/P EST LOW 20 MIN: CPT | Mod: 25,S$GLB,, | Performed by: OTOLARYNGOLOGY

## 2019-09-12 PROCEDURE — 3008F BODY MASS INDEX DOCD: CPT | Mod: CPTII,S$GLB,, | Performed by: OTOLARYNGOLOGY

## 2019-09-12 PROCEDURE — 3008F PR BODY MASS INDEX (BMI) DOCUMENTED: ICD-10-PCS | Mod: CPTII,S$GLB,, | Performed by: OTOLARYNGOLOGY

## 2019-09-12 PROCEDURE — 25500020 PHARM REV CODE 255: Performed by: OTOLARYNGOLOGY

## 2019-09-12 RX ADMIN — IOHEXOL 75 ML: 350 INJECTION, SOLUTION INTRAVENOUS at 03:09

## 2019-09-12 NOTE — PATIENT INSTRUCTIONS
Please get the CT - we will go on from there, depending on the results. Again, I am not concerned, just cautious.

## 2019-09-12 NOTE — ASSESSMENT & PLAN NOTE
Overall he looks good. He is making progress with his activity and his diet. His exam is improved, and there are appropriate post-therapy changes. His scans continue to improve, but the left neck mass remains - it is not enlarging or changing. Nonetheless, this was his presenting sign and there is some appropriate concern. I would like to get a contrasted CT scan. Depending on the results, we may proceed with an US-FNA - if there are findings concerning for growth or decremental change, then we will proceed with a selective neck dissection as a diagnostic and therapeutic endeavor, recognizing that the majority of residual palpable neck masses are scar and treatment effect, not residual viable tumor.    We will get the CT and go from there.

## 2019-09-12 NOTE — PROGRESS NOTES
HEAD [facility]EC[facility]Y [facility]INIC    Subjective:       Patient ID: Frankie Hoyt is a 49 y.o. male.    Chief Complaint: Follow-up (Malignant tumor of palatine tonsil)       History of cancer of lingual tonsil    9/18/2018 Biopsy     FNA =   Supplemental Diagnosis  Cellblock confirms squamous carcinoma with necrosis  Immunostain for p16 is positive. The controls stain appropriately         9/19/2018 Initial Diagnosis     Malignant tumor of palatine tonsil         9/20/2018 Tumor Conference     His case was discussed at the Multidisciplinary Head and Neck Team Planning Meeting.    Representatives from Medical Oncology, Radiation Oncology, Head and Neck Surgical Oncology, Psychosocial Oncology, and Speech and Language Pathology discussed the case with the following recommendations:    1) diagnostic right tonsillectomy  2) chemoradiation                 9/22/2018 Tumor Markers     Patient's tumor was tested for the following markers: p16.                                              Results of the tumor marker test revealed positive         10/3/2018 Surgery     Right tonsillectomy         10/4/2018 Tumor Conference     His case was discussed at the Multidisciplinary Head and Neck Team Planning Meeting.    Representatives from Medical Oncology, Radiation Oncology, Head and Neck Surgical Oncology, Psychosocial Oncology, and Speech and Language Pathology discussed the case with the following recommendations:    1) Chemoradiation                 10/16/2018 - 11/27/2018 Radiation Therapy     Treating physician: Dr. Monte  Total Dose: 70 Gy  Fractions: 35/35         10/16/2018 - 12/1/2018 Chemotherapy     Treatment Summary   Plan Name: OP HEAD NECK CISPLATIN + RADIOTHERAPY  Treatment Goal: Curative  Status: Inactive  Start Date: [No treatment day found]  End Date: [No treatment day found]  Provider: Nat Castillo MD  Chemotherapy: CISplatin (PLATINOL) 100 mg/m2 = 245 mg in sodium chloride 0.9% 500 mL chemo  "infusion, 100 mg/m2 = 245 mg (original dose ), Intravenous, Clinic/HOD 1 time, 0 of 6 cycles  Dose modification: 100 mg/m2 (Cycle 1, Reason: Error Acknowledged)    Plan Name: OP HEAD NECK CISPLATIN Q3W  Treatment Goal: Curative  Status: Active  Start Date: 10/16/2018  End Date: 12/1/2018 (Planned)  Provider: Nat Castillo MD  Chemotherapy: CISplatin (PLATINOL) 100 mg/m2 = 245 mg in sodium chloride 0.9% 500 mL chemo infusion, 100 mg/m2 = 245 mg, Intravenous, Clinic/HOD 1 time, 3 of 3 cycles  Administration: 245 mg (10/16/2018), 245 mg (11/5/2018), 245 mg (11/26/2018)                Follow-up   Pertinent negatives include no abdominal pain, chest pain, chills, congestion, coughing, diaphoresis, fatigue, fever, headaches, nausea, neck pain, rash, sore throat, vomiting or weakness.        Frankie Hoyt returns for another visit. He has no complaints, other than some subtle swelling in the left neck. He can appreciate it when he turns his head to the right. Whatever is there is much less prominent than the initial node at presentation. He has no pain. He lost about 50 pounds during his course, but he feels good and is back to normal (almost). He thinks he can taste spicy food better now. He is eating "too much". He denies dysphagia, odynophagia, throat pain, and otalgia. His voice is normal. There is no hemoptysis or hematemesis. He is breathing well.      Past Medical History:   Diagnosis Date    Anxiety     Cancer of tonsil     left tonsil    Diverticular disease 2012       Past Surgical History:   Procedure Laterality Date    COLONOSCOPY  2014    colonscopy  2012    LARYNGOSCOPY N/A 10/3/2018    Performed by Serge Mccray MD at [facility] OR 1ST F[facility]    tonsil biopsy 2018      TONSILLECTOMY Right 10/3/2018    Performed by Serge Mccray MD at [facility] OR 1ST [facility]       Current Outpatient Medications:     sildenafil (VIAGRA) 100 MG tablet, TAKE 1 TABLET BY MOUTH EVERY DAY AS NEEDED, Disp: 6 tablet, Rfl: 1    Review of " patient's allergies indicates:  No Known Allergies    Social History     Socioeconomic History    Marital status:      Spouse name: Not on file    Number of children: Not on file    Years of education: Not on file    Highest education level: Not on file   Occupational History    Not on file   Social Needs    Financial resource strain: Not on file    Food insecurity:     Worry: Not on file     Inability: Not on file    Transportation needs:     Medical: Not on file     Non-medical: Not on file   Tobacco Use    Smoking status: Former Smoker     Types: Cigars     Last attempt to quit:      Years since quittin.6    Smokeless tobacco: Former User    Tobacco comment: 1 per weekend   Substance and Sexual Activity    Alcohol use: Yes     Alcohol/week: 7.2 oz     Types: 12 Cans of beer per week     Comment: weekend    Drug use: No    Sexual activity: Yes     Partners: Female   Lifestyle    Physical activity:     Days per week: Not on file     Minutes per session: Not on file    Stress: Not on file   Relationships    Social connections:     Talks on phone: Not on file     Gets together: Not on file     Attends Caodaism service: Not on file     Active member of club or organization: Not on file     Attends meetings of clubs or organizations: Not on file     Relationship status: Not on file   Other Topics Concern    Patient feels they ought to cut down on drinking/drug use Not Asked    Patient annoyed by others criticizing their drinking/drug use Not Asked    Patient has felt bad or guilty about drinking/drug use Not Asked    Patient has had a drink/used drugs as an eye opener in the AM Not Asked   Social History Narrative     Frankie Hoyt lives with his wife and 2 younger children in [place], [place].  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  His spouse is Kym.   The patient reports good social support from his wife and  fellow football coaches. Frankie Hoyt's hobbies include working out       Family History   Problem Relation Age of Onset    Cancer Father         colon and prostate cancer    Heart disease Father      Review of Systems   Constitutional: Negative for appetite change, chills, diaphoresis, fatigue, fever and unexpected weight change.   HENT: Negative for congestion, dental problem, drooling, ear discharge, ear pain, facial swelling, hearing loss, mouth sores, nosebleeds, postnasal drip, rhinorrhea, sinus pressure, sneezing, sore throat, tinnitus, trouble swallowing and voice change.    Eyes: Negative for pain, discharge, redness and itching.   Respiratory: Negative for cough and shortness of breath.    Cardiovascular: Negative for chest pain.   Gastrointestinal: Negative for abdominal distention, abdominal pain, diarrhea, nausea and vomiting.   Endocrine: Negative for cold intolerance and heat intolerance.   Genitourinary: Negative for difficulty urinating.   Musculoskeletal: Negative for neck pain and neck stiffness.   Skin: Negative for rash.   Neurological: Negative for dizziness, weakness and headaches.   Hematological: Negative for adenopathy.       Objective:      Physical Exam   Constitutional: He is oriented to person, place, and time. He appears well-developed and well-nourished. He is cooperative.   HENT:   Head: Normocephalic and atraumatic.   Right Ear: Hearing, tympanic membrane, external ear and ear canal normal.   Left Ear: Hearing, tympanic membrane, external ear and ear canal normal.   Nose: Nose normal. No rhinorrhea, nasal deformity or septal deviation. Right sinus exhibits no maxillary sinus tenderness and no frontal sinus tenderness. Left sinus exhibits no maxillary sinus tenderness and no frontal sinus tenderness.   Mouth/Throat: Uvula is midline, oropharynx is clear and moist and mucous membranes are normal. He does not have dentures. No oral lesions. No trismus in the jaw. No oropharyngeal  exudate or posterior oropharyngeal edema.       Procedure: Flexible laryngoscopy  In order to fully examine the upper aerodigestive tract, including the larynx, in a patient with a hyperactive gag reflex, flexible endoscopy is required.  After explaining the procedure and obtaining verbal consent, a timeout was performed with the patient's participation according to the universal protocol. Both nasal cavities were anesthetized with 4% Xylocaine spray mixed with Salvatore-Synephrine. The flexible laryngoscope (#[id number]) was inserted into the nasal cavity and advanced to visualize the nasal cavity, nasopharynx, the posterior oropharynx, hypopharynx, and the endolarynx with the above findings noted. The scope was removed and the procedure terminated. The patient tolerated this procedure well without apparent complication.      FINDINGS  Nasopharynx - the torus is clear. There are no lesions of the posterior wall.   Oropharynx - no lesions of the tongue base. There is no obvious fullness or asymmetry. There is some blunting of the leftward vallecula and appropriate edema of the left AE fold  Hypopharynx - there are no lesions of the pyriform sinuses or postcricoid region    Larynx - there are no lesions of the supraglottic or glottic larynx. Vocal fold mobility is normal with complete closure.      Eyes: Pupils are equal, round, and reactive to light. Conjunctivae and EOM are normal. Right eye exhibits no chemosis. Left eye exhibits no chemosis.   Neck: Trachea normal, normal range of motion and phonation normal. Neck supple. No JVD present. No tracheal tenderness present. No tracheal deviation and no edema present. No thyroid mass and no thyromegaly present.       Salivary glands - there are no lesions, and there is no asymmetry of the parotid and submandibular glands   Cardiovascular: Normal rate, regular rhythm and intact distal pulses.   Pulmonary/Chest: Effort normal. No accessory muscle usage or stridor. No tachypnea.  No respiratory distress.   Abdominal: Normal appearance. He exhibits no distension. There is no guarding.   Lymphadenopathy:     He has no cervical adenopathy.        Right cervical: No deep cervical and no posterior cervical adenopathy present.       Left cervical: No posterior cervical adenopathy present.        Right: No supraclavicular adenopathy present.        Left: No supraclavicular adenopathy present.   Persistent fullness in level II - more palpable today because the lymphedema is less prominent today   Neurological: He is alert and oriented to person, place, and time. No cranial nerve deficit. Gait normal.   Skin: Skin is warm and dry. No lesion noted.   Psychiatric: He has a normal mood and affect. His speech is normal.   Vitals reviewed.      PET-CT 6/11/2019:  FINDINGS:    Impression       Prior mild hypermetabolic activity at the left base of the tongue in the region of the lingual tonsil has essentially resolved, with SUV max near background. Low level activity persists in the region of the matted soft tissue in the left lateral 2 ivan station.  Findings are suggestive of post treatment etiology, with residual component of malignancy possible though thought less likely.    No other abnormal hypermetabolic foci are present.     Assessment and Plan:       Problem List Items Addressed This Visit     History of cancer of lingual tonsil - Primary     Overall he looks good. He is making progress with his activity and his diet. His exam is improved, and there are appropriate post-therapy changes. His scans continue to improve, but the left neck mass remains - it is not enlarging or changing. Nonetheless, this was his presenting sign and there is some appropriate concern. I would like to get a contrasted CT scan. Depending on the results, we may proceed with an US-FNA - if there are findings concerning for growth or decremental change, then we will proceed with a selective neck dissection as a diagnostic  and therapeutic endeavor, recognizing that the majority of residual palpable neck masses are scar and treatment effect, not residual viable tumor.    We will get the CT and go from there.

## 2019-11-16 RX ORDER — SILDENAFIL 100 MG/1
TABLET, FILM COATED ORAL
Qty: 6 TABLET | Refills: 1 | Status: SHIPPED | OUTPATIENT
Start: 2019-11-16 | End: 2020-02-01

## 2019-12-11 ENCOUNTER — PATIENT MESSAGE (OUTPATIENT)
Dept: OTOLARYNGOLOGY | Facility: CLINIC | Age: 49
End: 2019-12-11

## 2020-01-09 ENCOUNTER — OFFICE VISIT (OUTPATIENT)
Dept: OTOLARYNGOLOGY | Facility: CLINIC | Age: 50
End: 2020-01-09
Payer: COMMERCIAL

## 2020-01-09 VITALS
WEIGHT: 244.06 LBS | DIASTOLIC BLOOD PRESSURE: 81 MMHG | HEART RATE: 71 BPM | SYSTOLIC BLOOD PRESSURE: 126 MMHG | TEMPERATURE: 99 F | BODY MASS INDEX: 33.1 KG/M2

## 2020-01-09 DIAGNOSIS — Z85.810 HISTORY OF CANCER OF LINGUAL TONSIL: Primary | ICD-10-CM

## 2020-01-09 DIAGNOSIS — I89.0 LYMPHEDEMA OF FACE: ICD-10-CM

## 2020-01-09 PROCEDURE — 3008F BODY MASS INDEX DOCD: CPT | Mod: CPTII,S$GLB,, | Performed by: OTOLARYNGOLOGY

## 2020-01-09 PROCEDURE — 99999 PR PBB SHADOW E&M-EST. PATIENT-LVL III: ICD-10-PCS | Mod: PBBFAC,,, | Performed by: OTOLARYNGOLOGY

## 2020-01-09 PROCEDURE — 99999 PR PBB SHADOW E&M-EST. PATIENT-LVL III: CPT | Mod: PBBFAC,,, | Performed by: OTOLARYNGOLOGY

## 2020-01-09 PROCEDURE — 99213 OFFICE O/P EST LOW 20 MIN: CPT | Mod: 25,S$GLB,, | Performed by: OTOLARYNGOLOGY

## 2020-01-09 PROCEDURE — 31575 DIAGNOSTIC LARYNGOSCOPY: CPT | Mod: S$GLB,,, | Performed by: OTOLARYNGOLOGY

## 2020-01-09 PROCEDURE — 99213 PR OFFICE/OUTPT VISIT, EST, LEVL III, 20-29 MIN: ICD-10-PCS | Mod: 25,S$GLB,, | Performed by: OTOLARYNGOLOGY

## 2020-01-09 PROCEDURE — 3008F PR BODY MASS INDEX (BMI) DOCUMENTED: ICD-10-PCS | Mod: CPTII,S$GLB,, | Performed by: OTOLARYNGOLOGY

## 2020-01-09 PROCEDURE — 31575 PR LARYNGOSCOPY, FLEXIBLE; DIAGNOSTIC: ICD-10-PCS | Mod: S$GLB,,, | Performed by: OTOLARYNGOLOGY

## 2020-01-09 NOTE — PROGRESS NOTES
HEAD [facility]K SURG[facility]IN[facility]    Subjective:       Patient ID: Frankie Hoyt is a 49 y.o. male.    Chief Complaint: Other (History of cancer of lingual tonsil)       History of cancer of lingual tonsil    9/18/2018 Biopsy     FNA =   Supplemental Diagnosis  Cellblock confirms squamous carcinoma with necrosis  Immunostain for p16 is positive. The controls stain appropriately      9/19/2018 Initial Diagnosis     Malignant tumor of palatine tonsil      9/20/2018 Tumor Conference     His case was discussed at the Multidisciplinary Head and Neck Team Planning Meeting.    Representatives from Medical Oncology, Radiation Oncology, Head and Neck Surgical Oncology, Psychosocial Oncology, and Speech and Language Pathology discussed the case with the following recommendations:    1) diagnostic right tonsillectomy  2) chemoradiation              9/22/2018 Tumor Markers     Patient's tumor was tested for the following markers: p16.                                              Results of the tumor marker test revealed positive      10/3/2018 Surgery     Right tonsillectomy      10/4/2018 Tumor Conference     His case was discussed at the Multidisciplinary Head and Neck Team Planning Meeting.    Representatives from Medical Oncology, Radiation Oncology, Head and Neck Surgical Oncology, Psychosocial Oncology, and Speech and Language Pathology discussed the case with the following recommendations:    1) Chemoradiation              10/16/2018 - 11/27/2018 Radiation Therapy     Treating physician: Dr. Monte  Total Dose: 70 Gy  Fractions: 35/35      10/16/2018 - 12/1/2018 Chemotherapy     Treatment Summary   Plan Name: OP HEAD NECK CISPLATIN + RADIOTHERAPY  Treatment Goal: Curative  Status: Inactive  Start Date: [No treatment day found]  End Date: [No treatment day found]  Provider: Nat Castillo MD  Chemotherapy: CISplatin (PLATINOL) 100 mg/m2 = 245 mg in sodium chloride 0.9% 500 mL chemo infusion, 100 mg/m2 = 245 mg  "(original dose ), Intravenous, Clinic/HOD 1 time, 0 of 6 cycles  Dose modification: 100 mg/m2 (Cycle 1, Reason: Error Acknowledged)    Plan Name: OP HEAD NECK CISPLATIN Q3W  Treatment Goal: Curative  Status: Active  Start Date: 10/16/2018  End Date: 12/1/2018 (Planned)  Provider: Nat Castillo MD  Chemotherapy: CISplatin (PLATINOL) 100 mg/m2 = 245 mg in sodium chloride 0.9% 500 mL chemo infusion, 100 mg/m2 = 245 mg, Intravenous, Clinic/HOD 1 time, 3 of 3 cycles  Administration: 245 mg (10/16/2018), 245 mg (11/5/2018), 245 mg (11/26/2018)           Follow-up   Pertinent negatives include no abdominal pain, chest pain, chills, congestion, coughing, diaphoresis, fatigue, fever, headaches, nausea, neck pain, rash, sore throat, vomiting or weakness.     Frankie Hoyt returns for another visit. He has no complaints, other than some subtle swelling in the left neck. He can appreciate it when he turns his head to the right. He has no pain. He lost about 50 pounds during his course, but he feels good and is back to normal (almost). He thinks he can taste spicy food better now. He is eating "too much". He denies dysphagia, odynophagia, throat pain, and otalgia. His voice is normal. There is no hemoptysis or hematemesis. He is breathing well. 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      Past Medical History:   Diagnosis Date    Anxiety     Cancer of tonsil     left tonsil    Diverticular disease 2012       Past Surgical History:   Procedure Laterality Date    COLONOSCOPY  2014    colonscopy  2012    LARYNGOSCOPY N/A 10/3/2018    Procedure: LARYNGOSCOPY;  Surgeon: Serge Mccray MD;  Location: [facility] OR [address] [place] [address];  Service: ENT;  Laterality: N/A;    tonsil biopsy 2018      TONSILLECTOMY Right 10/3/2018    Procedure: TONSILLECTOMY;  Surgeon: Serge Mccray MD;  Location: [facility] OR [address] [place] [address];  Service: ENT;  Laterality: Right;       Current Outpatient Medications:     " sildenafil (VIAGRA) 100 MG tablet, TAKE 1 TABLET BY MOUTH EVERY DAY AS NEEDED, Disp: 6 tablet, Rfl: 1    Review of patient's allergies indicates:  No Known Allergies    Social History     Socioeconomic History    Marital status:      Spouse name: Not on file    Number of children: Not on file    Years of education: Not on file    Highest education level: Not on file   Occupational History    Not on file   Social Needs    Financial resource strain: Not on file    Food insecurity:     Worry: Not on file     Inability: Not on file    Transportation needs:     Medical: Not on file     Non-medical: Not on file   Tobacco Use    Smoking status: Former Smoker     Types: Cigars     Last attempt to quit:      Years since quittin.0    Smokeless tobacco: Former User    Tobacco comment: 1 per weekend   Substance and Sexual Activity    Alcohol use: Yes     Alcohol/week: 12.0 standard drinks     Types: 12 Cans of beer per week     Comment: weekend    Drug use: No    Sexual activity: Yes     Partners: Female   Lifestyle    Physical activity:     Days per week: Not on file     Minutes per session: Not on file    Stress: Not on file   Relationships    Social connections:     Talks on phone: Not on file     Gets together: Not on file     Attends Religion service: Not on file     Active member of club or organization: Not on file     Attends meetings of clubs or organizations: Not on file     Relationship status: Not on file   Other Topics Concern    Patient feels they ought to cut down on drinking/drug use Not Asked    Patient annoyed by others criticizing their drinking/drug use Not Asked    Patient has felt bad or guilty about drinking/drug use Not Asked    Patient has had a drink/used drugs as an eye opener in the AM Not Asked   Social History Narrative     Frankie Hoyt lives with his wife and 2 younger children in [place], [place].  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  His spouse is Kym.   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 Frankie Hoyt's hobbies include working out       Family History   Problem Relation Age of Onset    Cancer Father         colon and prostate cancer    Heart disease Father      Review of Systems   Constitutional: Negative for appetite change, chills, diaphoresis, fatigue, fever and unexpected weight change.   HENT: Negative for congestion, dental problem, drooling, ear discharge, ear pain, facial swelling, hearing loss, mouth sores, nosebleeds, postnasal drip, rhinorrhea, sinus pressure, sneezing, sore throat, tinnitus, trouble swallowing and voice change.    Eyes: Negative for pain, discharge, redness and itching.   Respiratory: Negative for cough and shortness of breath.    Cardiovascular: Negative for chest pain.   Gastrointestinal: Negative for abdominal distention, abdominal pain, diarrhea, nausea and vomiting.   Endocrine: Negative for cold intolerance and heat intolerance.   Genitourinary: Negative for difficulty urinating.   Musculoskeletal: Negative for neck pain and neck stiffness.   Skin: Negative for rash.   Neurological: Negative for dizziness, weakness and headaches.   Hematological: Negative for adenopathy.       Objective:      Physical Exam   Constitutional: He is oriented to person, place, and time. He appears well-developed and well-nourished. He is cooperative.   HENT:   Head: Normocephalic and atraumatic.   Right Ear: Hearing, tympanic membrane, external ear and ear canal normal.   Left Ear: Hearing, tympanic membrane, external ear and ear canal normal.   Nose: Nose normal. No rhinorrhea, nasal deformity or septal deviation. Right sinus exhibits no maxillary sinus tenderness and no frontal sinus tenderness. Left sinus exhibits no maxillary sinus tenderness and no frontal sinus tenderness.   Mouth/Throat: Uvula is midline, oropharynx is  clear and moist and mucous membranes are normal. He does not have dentures. No oral lesions. No trismus in the jaw. No oropharyngeal exudate or posterior oropharyngeal edema.       Procedure: Flexible laryngoscopy  In order to fully examine the upper aerodigestive tract, including the larynx, in a patient with a hyperactive gag reflex, flexible endoscopy is required.  After explaining the procedure and obtaining verbal consent, a timeout was performed with the patient's participation according to the universal protocol. Both nasal cavities were anesthetized with 4% Xylocaine spray mixed with Salvatore-Synephrine. The flexible laryngoscope (#[id number]) was inserted into the nasal cavity and advanced to visualize the nasal cavity, nasopharynx, the posterior oropharynx, hypopharynx, and the endolarynx with the above findings noted. The scope was removed and the procedure terminated. The patient tolerated this procedure well without apparent complication.      FINDINGS  Nasopharynx - the torus is clear. There are no lesions of the posterior wall.   Oropharynx - no lesions of the tongue base. There is no obvious fullness or asymmetry. There is some blunting of the leftward vallecula and appropriate edema of the left AE fold  Hypopharynx - there are no lesions of the pyriform sinuses or postcricoid region    Larynx - there are no lesions of the supraglottic or glottic larynx. Vocal fold mobility is normal with complete closure.      Eyes: Pupils are equal, round, and reactive to light. Conjunctivae and EOM are normal. Right eye exhibits no chemosis. Left eye exhibits no chemosis.   Neck: Trachea normal, normal range of motion and phonation normal. Neck supple. No JVD present. No tracheal tenderness present. No tracheal deviation and no edema present. No thyroid mass and no thyromegaly present.       Salivary glands - there are no lesions, and there is no asymmetry of the parotid and submandibular glands   Cardiovascular: Normal  rate, regular rhythm and intact distal pulses.   Pulmonary/Chest: Effort normal. No accessory muscle usage or stridor. No tachypnea. No respiratory distress.   Abdominal: Normal appearance. He exhibits no distension. There is no guarding.   Lymphadenopathy:     He has no cervical adenopathy.        Right cervical: No deep cervical and no posterior cervical adenopathy present.       Left cervical: No posterior cervical adenopathy present.        Right: No supraclavicular adenopathy present.        Left: No supraclavicular adenopathy present.   Persistent fullness in level II - more palpable today because the lymphedema is less prominent today   Neurological: He is alert and oriented to person, place, and time. No cranial nerve deficit. Gait normal.   Skin: Skin is warm and dry. No lesion noted.   Psychiatric: He has a normal mood and affect. His speech is normal.   Vitals reviewed.          Assessment and Plan:       Problem List Items Addressed This Visit     History of cancer of lingual tonsil - Primary     Overall he looks good. He has been doing well and continues to improve. The neck disease appears to just be scar tissue, as we discussed after his last scan. There is no hypodensity, and the FDG levels remain low. Although likely scar, the only way to know for sure is to do the neck dissection, but that carries risks of side effects with an extremely low likelihood of malignancy, given the stability of findings over time. I do not think it prudent to transition to clinical follow alone, nor is there sufficient evidence in actual viewing of the images to advocate stridently for surgery. I therefore would like to obtain another PET-CT, which will gauge future interventions/assessments. If it remains stable to improved, then I will likely obtain another CT scan in 6 months. If there is concern, then an US-FNA would be the next step. He expressed understanding and agrees with the plan.           Relevant Orders     NM PET CT Routine Skull to Mid Thigh    Lymphedema of face

## 2020-01-09 NOTE — ASSESSMENT & PLAN NOTE
Overall he looks good. He has been doing well and continues to improve. The neck disease appears to just be scar tissue, as we discussed after his last scan. There is no hypodensity, and the FDG levels remain low. Although likely scar, the only way to know for sure is to do the neck dissection, but that carries risks of side effects with an extremely low likelihood of malignancy, given the stability of findings over time. I do not think it prudent to transition to clinical follow alone, nor is there sufficient evidence in actual viewing of the images to advocate stridently for surgery. I therefore would like to obtain another PET-CT, which will gauge future interventions/assessments. If it remains stable to improved, then I will likely obtain another CT scan in 6 months. If there is concern, then an US-FNA would be the next step. He expressed understanding and agrees with the plan.

## 2020-01-20 ENCOUNTER — HOSPITAL ENCOUNTER (OUTPATIENT)
Dept: RADIOLOGY | Facility: HOSPITAL | Age: 50
Discharge: HOME | End: 2020-01-20
Attending: OTOLARYNGOLOGY
Payer: COMMERCIAL

## 2020-01-20 ENCOUNTER — PATIENT MESSAGE (OUTPATIENT)
Dept: OTOLARYNGOLOGY | Facility: CLINIC | Age: 50
End: 2020-01-20

## 2020-01-20 DIAGNOSIS — Z85.810 HISTORY OF CANCER OF LINGUAL TONSIL: ICD-10-CM

## 2020-01-20 LAB — POCT GLUCOSE: 83 MG/DL (ref 70–110)

## 2020-01-20 PROCEDURE — 78815 PET IMAGE W/CT SKULL-THIGH: CPT | Mod: TC

## 2020-01-20 PROCEDURE — A9552 F18 FDG: HCPCS

## 2020-01-20 PROCEDURE — 78815 NM PET CT ROUTINE: ICD-10-PCS | Mod: 26,PS,, | Performed by: RADIOLOGY

## 2020-01-20 PROCEDURE — 78815 PET IMAGE W/CT SKULL-THIGH: CPT | Mod: 26,PS,, | Performed by: RADIOLOGY

## 2020-01-22 ENCOUNTER — PATIENT MESSAGE (OUTPATIENT)
Dept: OTOLARYNGOLOGY | Facility: CLINIC | Age: 50
End: 2020-01-22

## 2020-02-01 RX ORDER — SILDENAFIL 100 MG/1
TABLET, FILM COATED ORAL
Qty: 6 TABLET | Refills: 1 | Status: SHIPPED | OUTPATIENT
Start: 2020-02-01 | End: 2020-04-05

## 2020-04-05 RX ORDER — SILDENAFIL 100 MG/1
TABLET, FILM COATED ORAL
Qty: 6 TABLET | Refills: 1 | Status: SHIPPED | OUTPATIENT
Start: 2020-04-05 | End: 2020-07-28

## 2020-05-22 ENCOUNTER — TELEPHONE (OUTPATIENT)
Dept: FAMILY MEDICINE | Facility: CLINIC | Age: 50
End: 2020-05-22

## 2020-05-22 NOTE — TELEPHONE ENCOUNTER
----- Message from Nena Degroot sent at 5/22/2020  2:05 PM CDT -----  Contact: Pt  Pt called to speak to the nurse in regards to an rx   that is not covered by his [employer] insurance.  sildenafiL (VIAGRA) 100 MG tablet    Pt can be reached at [phone].    Thank you

## 2020-05-22 NOTE — TELEPHONE ENCOUNTER
Pt stated that since switching to a different [employer] plan, Sildenafil is no longer covered. I will attempt a PA on medication.     BIN: [id number]  PCN: [id number]  RxGroup: [id number]    Member ID: [id number]    [employer] insurance

## 2020-05-27 DIAGNOSIS — Z85.810 HISTORY OF CANCER OF LINGUAL TONSIL: Primary | ICD-10-CM

## 2020-05-29 ENCOUNTER — PATIENT MESSAGE (OUTPATIENT)
Dept: OTOLARYNGOLOGY | Facility: CLINIC | Age: 50
End: 2020-05-29

## 2020-05-29 ENCOUNTER — TELEPHONE (OUTPATIENT)
Dept: OTOLARYNGOLOGY | Facility: CLINIC | Age: 50
End: 2020-05-29

## 2020-05-29 NOTE — TELEPHONE ENCOUNTER
RALF CRESPO Key: [id number] help? Call us at [phone]  Outcome  Additional Information Required  Available without authorization.  Drug  Sildenafil Citrate 100MG tablets  Form  [employer] Electronic PA Form

## 2020-07-28 RX ORDER — SILDENAFIL 100 MG/1
TABLET, FILM COATED ORAL
Qty: 6 TABLET | Refills: 1 | Status: SHIPPED | OUTPATIENT
Start: 2020-07-28 | End: 2021-03-22 | Stop reason: SDUPTHER

## 2020-08-05 ENCOUNTER — OFFICE VISIT (OUTPATIENT)
Dept: OTOLARYNGOLOGY | Facility: CLINIC | Age: 50
End: 2020-08-05
Payer: COMMERCIAL

## 2020-08-05 VITALS
HEART RATE: 61 BPM | TEMPERATURE: 98 F | BODY MASS INDEX: 34.74 KG/M2 | SYSTOLIC BLOOD PRESSURE: 143 MMHG | WEIGHT: 256.19 LBS | RESPIRATION RATE: 20 BRPM | DIASTOLIC BLOOD PRESSURE: 73 MMHG

## 2020-08-05 DIAGNOSIS — Z85.810 HISTORY OF CANCER OF LINGUAL TONSIL: Primary | ICD-10-CM

## 2020-08-05 PROCEDURE — 99999 PR PBB SHADOW E&M-EST. PATIENT-LVL IV: CPT | Mod: PBBFAC,,, | Performed by: OTOLARYNGOLOGY

## 2020-08-05 PROCEDURE — 99999 PR PBB SHADOW E&M-EST. PATIENT-LVL IV: ICD-10-PCS | Mod: PBBFAC,,, | Performed by: OTOLARYNGOLOGY

## 2020-08-05 PROCEDURE — 99213 OFFICE O/P EST LOW 20 MIN: CPT | Mod: 25,S$GLB,, | Performed by: OTOLARYNGOLOGY

## 2020-08-05 PROCEDURE — 3008F BODY MASS INDEX DOCD: CPT | Mod: CPTII,S$GLB,, | Performed by: OTOLARYNGOLOGY

## 2020-08-05 PROCEDURE — 99213 PR OFFICE/OUTPT VISIT, EST, LEVL III, 20-29 MIN: ICD-10-PCS | Mod: 25,S$GLB,, | Performed by: OTOLARYNGOLOGY

## 2020-08-05 PROCEDURE — 31575 DIAGNOSTIC LARYNGOSCOPY: CPT | Mod: S$GLB,,, | Performed by: OTOLARYNGOLOGY

## 2020-08-05 PROCEDURE — 31575 PR LARYNGOSCOPY, FLEXIBLE; DIAGNOSTIC: ICD-10-PCS | Mod: S$GLB,,, | Performed by: OTOLARYNGOLOGY

## 2020-08-05 PROCEDURE — 3008F PR BODY MASS INDEX (BMI) DOCUMENTED: ICD-10-PCS | Mod: CPTII,S$GLB,, | Performed by: OTOLARYNGOLOGY

## 2020-08-05 NOTE — PROGRESS NOTES
[facility]    Subjective:       Patient ID: Frankie Hoyt is a 49 y.o. male.    Chief Complaint: Follow-up (History of cancer of lingual tonsil)    Oncology History   History of cancer of lingual tonsil   9/18/2018 Biopsy    FNA =   Supplemental Diagnosis  Cellblock confirms squamous carcinoma with necrosis  Immunostain for p16 is positive. The controls stain appropriately     9/19/2018 Initial Diagnosis    Malignant tumor of palatine tonsil     9/20/2018 Tumor Conference    His case was discussed at the Multidisciplinary Head and Neck Team Planning Meeting.    Representatives from Medical Oncology, Radiation Oncology, Head and Neck Surgical Oncology, Psychosocial Oncology, and Speech and Language Pathology discussed the case with the following recommendations:    1) diagnostic right tonsillectomy  2) chemoradiation             9/22/2018 Tumor Markers    Patient's tumor was tested for the following markers: p16.                                              Results of the tumor marker test revealed positive     10/3/2018 Surgery    Right tonsillectomy     10/4/2018 Tumor Conference    His case was discussed at the Multidisciplinary Head and Neck Team Planning Meeting.    Representatives from Medical Oncology, Radiation Oncology, Head and Neck Surgical Oncology, Psychosocial Oncology, and Speech and Language Pathology discussed the case with the following recommendations:    1) Chemoradiation             10/16/2018 - 11/27/2018 Radiation Therapy    Treating physician: Dr. Monte  Total Dose: 70 Gy  Fractions: 35/35     10/16/2018 - 12/1/2018 Chemotherapy    Treatment Summary   Plan Name: OP HEAD NECK CISPLATIN + RADIOTHERAPY  Treatment Goal: Curative  Status: Inactive  Start Date: [No treatment day found]  End Date: [No treatment day found]  Provider: Nat Castillo MD  Chemotherapy: CISplatin (PLATINOL) 100 mg/m2 = 245 mg in sodium chloride 0.9% 500 mL chemo infusion, 100 mg/m2 = 245 mg  "(original dose ), Intravenous, Clinic/HOD 1 time, 0 of 6 cycles  Dose modification: 100 mg/m2 (Cycle 1, Reason: Error Acknowledged)    Plan Name: OP HEAD NECK CISPLATIN Q3W  Treatment Goal: Curative  Status: Active  Start Date: 10/16/2018  End Date: 12/1/2018 (Planned)  Provider: Nat Castillo MD  Chemotherapy: CISplatin (PLATINOL) 100 mg/m2 = 245 mg in sodium chloride 0.9% 500 mL chemo infusion, 100 mg/m2 = 245 mg, Intravenous, Clinic/HOD 1 time, 3 of 3 cycles  Administration: 245 mg (10/16/2018), 245 mg (11/5/2018), 245 mg (11/26/2018)           Follow-up  Pertinent negatives include no abdominal pain, chest pain, chills, congestion, coughing, diaphoresis, fatigue, fever, headaches, nausea, neck pain, rash, sore throat, vomiting or weakness.     Frankie Hoyt returns for another visit. He has no complaints, other than some subtle swelling in the left neck. He can appreciate it when he turns his head to the right. He has no pain. He says that he has been "pushing it" and that he does not "slow down". He is very active, has been working out a lot, and has gained weight during COVID. He reports hurting his left trapezius about 2 weeks ago in the gym, and he describes some intermittent cramps that respond to massage. He thinks he can taste spicy food better now.He is eating "way too much". He denies dysphagia, odynophagia, throat pain, and otalgia. His voice is normal. There is no hemoptysis or hematemesis. He is breathing well. 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 He can no longer feel his neck fullness when he is either relaxed or turning to the right and looking up.    Past Medical History:   Diagnosis Date    Anxiety     Cancer of tonsil     left tonsil    Diverticular disease 2012       Past Surgical History:   Procedure Laterality Date    COLONOSCOPY  2014    colonscopy  2012    LARYNGOSCOPY N/A 10/3/2018    Procedure: " LARYNGOSCOPY;  Surgeon: Serge Mccray MD;  Location: [facility] OR [address] [place] [address];  Service: ENT;  Laterality: N/A;    tonsil biopsy 2018      TONSILLECTOMY Right 10/3/2018    Procedure: TONSILLECTOMY;  Surgeon: Serge Mccray MD;  Location: [facility] OR [address] [place] [address];  Service: ENT;  Laterality: Right;       Current Outpatient Medications:     sildenafiL (VIAGRA) 100 MG tablet, TAKE 1 TABLET BY MOUTH EVERY DAY AS NEEDED, Disp: 6 tablet, Rfl: 1    Review of patient's allergies indicates:  No Known Allergies    Social History     Socioeconomic History    Marital status:      Spouse name: Not on file    Number of children: Not on file    Years of education: Not on file    Highest education level: Not on file   Occupational History    Not on file   Social Needs    Financial resource strain: Not on file    Food insecurity     Worry: Not on file     Inability: Not on file    Transportation needs     Medical: Not on file     Non-medical: Not on file   Tobacco Use    Smoking status: Former Smoker     Types: Cigars     Quit date:      Years since quittin.6    Smokeless tobacco: Former User    Tobacco comment: 1 per weekend   Substance and Sexual Activity    Alcohol use: Yes     Alcohol/week: 12.0 standard drinks     Types: 12 Cans of beer per week     Comment: weekend    Drug use: No    Sexual activity: Yes     Partners: Female   Lifestyle    Physical activity     Days per week: Not on file     Minutes per session: Not on file    Stress: Not on file   Relationships    Social connections     Talks on phone: Not on file     Gets together: Not on file     Attends [employer] service: Not on file     Active member of club or organization: Not on file     Attends meetings of clubs or organizations: Not on file     Relationship status: Not on file   Other Topics Concern    Patient feels they ought to cut down on drinking/drug use Not Asked    Patient annoyed by others criticizing their drinking/drug use Not Asked     Patient has felt bad or guilty about drinking/drug use Not Asked    Patient has had a drink/used drugs as an eye opener in the AM Not Asked   Social History Narrative     Frankie Hoyt lives with his wife and 2 younger children in [place], [place].  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  His spouse is Kym.   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 Frankie Hoyt's hobbies include working out       Family History   Problem Relation Age of Onset    Cancer Father         colon and prostate cancer    Heart disease Father      Review of Systems   Constitutional: Negative for appetite change, chills, diaphoresis, fatigue, fever and unexpected weight change.   HENT: Negative for congestion, dental problem, drooling, ear discharge, ear pain, facial swelling, hearing loss, mouth sores, nosebleeds, postnasal drip, rhinorrhea, sinus pressure, sneezing, sore throat, tinnitus, trouble swallowing and voice change.    Eyes: Negative for pain, discharge, redness and itching.   Respiratory: Negative for cough and shortness of breath.    Cardiovascular: Negative for chest pain.   Gastrointestinal: Negative for abdominal distention, abdominal pain, diarrhea, nausea and vomiting.   Endocrine: Negative for cold intolerance and heat intolerance.   Genitourinary: Negative for difficulty urinating.   Musculoskeletal: Negative for neck pain and neck stiffness.   Skin: Negative for rash.   Neurological: Negative for dizziness, weakness and headaches.   Hematological: Negative for adenopathy.       Objective:      Physical Exam  Vitals signs reviewed.   Constitutional:       Appearance: Normal appearance. He is well-developed.   HENT:      Head: Normocephalic and atraumatic.      Jaw: No trismus.      Right Ear: Hearing, tympanic membrane, ear canal and external ear normal.      Left Ear: Hearing, tympanic membrane, ear  canal and external ear normal.      Nose: Nose normal. No nasal deformity, septal deviation or rhinorrhea.      Right Sinus: No maxillary sinus tenderness or frontal sinus tenderness.      Left Sinus: No maxillary sinus tenderness or frontal sinus tenderness.      Mouth/Throat:      Mouth: No oral lesions.      Dentition: Does not have dentures.      Pharynx: Uvula midline. No oropharyngeal exudate.        Comments: Procedure: Flexible laryngoscopy  In order to fully examine the upper aerodigestive tract, including the larynx, in a patient with a hyperactive gag reflex, flexible endoscopy is required.  After explaining the procedure and obtaining verbal consent, a timeout was performed with the patient's participation according to the universal protocol. Both nasal cavities were anesthetized with 4% Xylocaine spray mixed with Salvatore-Synephrine. The flexible laryngoscope (#[id number]) was inserted into the nasal cavity and advanced to visualize the nasal cavity, nasopharynx, the posterior oropharynx, hypopharynx, and the endolarynx with the above findings noted. The scope was removed and the procedure terminated. The patient tolerated this procedure well without apparent complication.      FINDINGS  Nasopharynx - the torus is clear. There are no lesions of the posterior wall.   Oropharynx - no lesions of the tongue base. There is no obvious fullness or asymmetry.  Hypopharynx - there are no lesions of the pyriform sinuses or postcricoid region    Larynx - there are no lesions of the supraglottic or glottic larynx. Vocal fold mobility is normal with complete closure.     Eyes:      Conjunctiva/sclera: Conjunctivae normal.      Right eye: No chemosis.     Left eye: No chemosis.     Pupils: Pupils are equal, round, and reactive to light.   Neck:      Musculoskeletal: Normal range of motion and neck supple. No edema.      Thyroid: No thyroid mass or thyromegaly.      Vascular: No JVD.      Trachea: Trachea and phonation  normal. No tracheal tenderness or tracheal deviation.        Comments: Salivary glands - there are no lesions, and there is no asymmetry of the parotid and submandibular glands  Cardiovascular:      Rate and Rhythm: Normal rate and regular rhythm.   Pulmonary:      Effort: Pulmonary effort is normal. No tachypnea, accessory muscle usage or respiratory distress.      Breath sounds: No stridor.   Abdominal:      General: There is no distension.      Tenderness: There is no guarding.   Lymphadenopathy:      Cervical: No cervical adenopathy.      Right cervical: No deep or posterior cervical adenopathy.     Left cervical: No deep or posterior cervical adenopathy.      Upper Body:      Right upper body: No supraclavicular adenopathy.      Left upper body: No supraclavicular adenopathy.      Comments: I palpate no abnormal mass. His carotid bifurcation is prominent, but there is no detectable mass in its vicinity. Regardless of head/neck position   Skin:     General: Skin is warm and dry.      Findings: No lesion.   Neurological:      Mental Status: He is alert and oriented to person, place, and time.      Cranial Nerves: No cranial nerve deficit.      Gait: Gait normal.   Psychiatric:         Speech: Speech normal.         Behavior: Behavior is cooperative.             Assessment and Plan:       Problem List Items Addressed This Visit     History of cancer of lingual tonsil - Primary     Overall he looks great and has no clinical evidence of recurrent disease. The neck fullness seems to have abated. Because he has had some low level, non-pathologic (but apparently worthy of comment) uptake on his prior scans, I feel compelled to get another PET-CT. If this one is clear, then we can transition to symptom-based imaging for followup. We will get the scan now, and plan for a follow-up visit in 3 months virtually, assuming no change in symptoms. He should return in person in 6 months for a full exam. He expressed  understanding to call us if anything changed in his symptoms or subjective exam.            Relevant Orders    NM PET CT Routine Skull to Mid Thigh

## 2020-08-05 NOTE — ASSESSMENT & PLAN NOTE
Overall he looks great and has no clinical evidence of recurrent disease. The neck fullness seems to have abated. Because he has had some low level, non-pathologic (but apparently worthy of comment) uptake on his prior scans, I feel compelled to get another PET-CT. If this one is clear, then we can transition to symptom-based imaging for followup. We will get the scan now, and plan for a follow-up visit in 3 months virtually, assuming no change in symptoms. He should return in person in 6 months for a full exam. He expressed understanding to call us if anything changed in his symptoms or subjective exam.

## 2020-08-28 ENCOUNTER — TELEPHONE (OUTPATIENT)
Dept: HEMATOLOGY/ONCOLOGY | Facility: CLINIC | Age: 50
End: 2020-08-28

## 2020-08-28 DIAGNOSIS — Z85.810 HISTORY OF CANCER OF LINGUAL TONSIL: Primary | ICD-10-CM

## 2020-09-08 ENCOUNTER — TELEPHONE (OUTPATIENT)
Dept: FAMILY MEDICINE | Facility: CLINIC | Age: 50
End: 2020-09-08

## 2020-09-08 RX ORDER — METRONIDAZOLE 500 MG/1
500 TABLET ORAL 3 TIMES DAILY
Qty: 30 TABLET | Refills: 0 | Status: SHIPPED | OUTPATIENT
Start: 2020-09-08 | End: 2021-05-28 | Stop reason: SDUPTHER

## 2020-09-11 ENCOUNTER — HOSPITAL ENCOUNTER (OUTPATIENT)
Dept: RADIOLOGY | Facility: HOSPITAL | Age: 50
Discharge: HOME | End: 2020-09-11
Attending: OTOLARYNGOLOGY
Payer: COMMERCIAL

## 2020-09-11 ENCOUNTER — OFFICE VISIT (OUTPATIENT)
Dept: HEMATOLOGY/ONCOLOGY | Facility: CLINIC | Age: 50
End: 2020-09-11
Payer: COMMERCIAL

## 2020-09-11 VITALS
HEART RATE: 65 BPM | HEIGHT: 72 IN | TEMPERATURE: 98 F | BODY MASS INDEX: 34.58 KG/M2 | SYSTOLIC BLOOD PRESSURE: 156 MMHG | WEIGHT: 255.31 LBS | DIASTOLIC BLOOD PRESSURE: 72 MMHG

## 2020-09-11 DIAGNOSIS — Z85.810 HISTORY OF CANCER OF LINGUAL TONSIL: ICD-10-CM

## 2020-09-11 DIAGNOSIS — Z80.0 FAMILY HISTORY OF COLON CANCER REQUIRING SCREENING COLONOSCOPY: ICD-10-CM

## 2020-09-11 DIAGNOSIS — Z85.810 HISTORY OF CANCER OF LINGUAL TONSIL: Primary | ICD-10-CM

## 2020-09-11 PROBLEM — L08.9 SKIN INFECTION: Status: RESOLVED | Noted: 2018-10-22 | Resolved: 2020-09-11

## 2020-09-11 PROBLEM — L02.91 ABSCESS: Status: RESOLVED | Noted: 2018-10-22 | Resolved: 2020-09-11

## 2020-09-11 LAB — POCT GLUCOSE: 106 MG/DL (ref 70–110)

## 2020-09-11 PROCEDURE — 99213 PR OFFICE/OUTPT VISIT, EST, LEVL III, 20-29 MIN: ICD-10-PCS | Mod: S$GLB,,, | Performed by: INTERNAL MEDICINE

## 2020-09-11 PROCEDURE — 3008F BODY MASS INDEX DOCD: CPT | Mod: CPTII,S$GLB,, | Performed by: INTERNAL MEDICINE

## 2020-09-11 PROCEDURE — 99999 PR PBB SHADOW E&M-EST. PATIENT-LVL III: CPT | Mod: PBBFAC,,, | Performed by: INTERNAL MEDICINE

## 2020-09-11 PROCEDURE — 99999 PR PBB SHADOW E&M-EST. PATIENT-LVL III: ICD-10-PCS | Mod: PBBFAC,,, | Performed by: INTERNAL MEDICINE

## 2020-09-11 PROCEDURE — 78815 PET IMAGE W/CT SKULL-THIGH: CPT | Mod: 26,PS,, | Performed by: RADIOLOGY

## 2020-09-11 PROCEDURE — 78815 NM PET CT ROUTINE: ICD-10-PCS | Mod: 26,PS,, | Performed by: RADIOLOGY

## 2020-09-11 PROCEDURE — A9552 F18 FDG: HCPCS

## 2020-09-11 PROCEDURE — 99213 OFFICE O/P EST LOW 20 MIN: CPT | Mod: S$GLB,,, | Performed by: INTERNAL MEDICINE

## 2020-09-11 PROCEDURE — 3008F PR BODY MASS INDEX (BMI) DOCUMENTED: ICD-10-PCS | Mod: CPTII,S$GLB,, | Performed by: INTERNAL MEDICINE

## 2020-09-11 PROCEDURE — 78815 PET IMAGE W/CT SKULL-THIGH: CPT | Mod: TC

## 2020-09-11 NOTE — PROGRESS NOTES
Subjective:       Patient ID: Frankie Hoyt is a 50 y.o. male.    Chief Complaint: History of cancer of lingual tonsil  ONCOLOGIC HISTORY: Mr. Frankie Hoyt is a 49 year-old smoker and tobacco chewer who had waxing and waning neck mass for the last 18 months.  He underwent a CT scan in February 2017 that was read as negative.  He noticed that his left cheek swelling really got worse in July 2018.  He was seen in Urgent Care and received antibiotics without any improvement.  Then he saw Dr. Franks and underwent FNA of the left neck, which was positive for malignant cells.  She also noted left tonsillar asymmetry.  CT neck at this time showed a mass in the left neck at the level of the angle of the mandible, which represented an   enlarged lymph node measuring 3.9 x 2.4 x 5.2 cm, mass demonstrated irregular margins in keeping with extracapsular extension.  There was encasement of the external carotid artery and internal jugular vein with less than 180-degree abutment of the internal and distal common carotid artery.  There was prominence of the left palatine tonsil as well as fullness of the left vallecula.  He was seen by Dr. Mccray and underwent a PET scan, which showed a hypermetabolic left cervical mass and bilateral palatine tonsils.  There was a low level uptake in the borderline right-sided cervical lymph node and no evidence of distant   Metastasis        He completed cycle 3 of chemo/RT with Cisplatin as of 12/4/18     HPIHe comes in for labs. He is scheduled for a PET scan today.  He feels well and denies any trouble swallowing, saw Dr. Mccray in early August and had no clinical evidence of recurrence    Review of Systems   Constitutional: Negative for appetite change, fatigue and unexpected weight change.   HENT: Negative for mouth sores.    Eyes: Negative for visual disturbance.   Respiratory: Negative for cough and shortness of breath.    Cardiovascular: Negative for chest pain.   Gastrointestinal: Negative  for abdominal pain and diarrhea.   Genitourinary: Negative for frequency.   Musculoskeletal: Negative for back pain.   Integumentary:  Negative for rash.   Neurological: Negative for headaches.   Hematological: Negative for adenopathy.   Psychiatric/Behavioral: The patient is not nervous/anxious.    All other systems reviewed and are negative.        Objective:      Physical Exam  Vitals signs reviewed.   Constitutional:       Appearance: He is well-developed.   HENT:      Mouth/Throat:      Pharynx: No oropharyngeal exudate.   Cardiovascular:      Rate and Rhythm: Normal rate.      Heart sounds: Normal heart sounds.   Pulmonary:      Effort: Pulmonary effort is normal.      Breath sounds: Normal breath sounds. No wheezing.   Abdominal:      General: Bowel sounds are normal.      Palpations: Abdomen is soft.      Tenderness: There is no abdominal tenderness.   Musculoskeletal:         General: No tenderness.   Lymphadenopathy:      Cervical: No cervical adenopathy.   Skin:     General: Skin is warm and dry.      Findings: No rash.   Neurological:      Mental Status: He is alert and oriented to person, place, and time.      Coordination: Coordination normal.   Psychiatric:         Thought Content: Thought content normal.         Judgment: Judgment normal.           LABS:  WBC   Date Value Ref Range Status   09/11/2020 5.27 3.90 - 12.70 K/uL Final     Hemoglobin   Date Value Ref Range Status   09/11/2020 14.0 14.0 - 18.0 g/dL Final     Hematocrit   Date Value Ref Range Status   09/11/2020 42.9 40.0 - 54.0 % Final     Platelets   Date Value Ref Range Status   09/11/2020 253 150 - 350 K/uL Final     Gran # (ANC)   Date Value Ref Range Status   09/11/2020 2.9 1.8 - 7.7 K/uL Final     Comment:     The ANC is based on a white cell differential from an   automated cell counter. It has not been microscopically   reviewed for the presence of abnormal cells. Clinical   correlation is required.         Chemistry         Component Value Date/Time     09/11/2020 0703    K 4.5 09/11/2020 0703     09/11/2020 0703    CO2 27 09/11/2020 0703    BUN 24 (H) 09/11/2020 0703    CREATININE 1.2 09/11/2020 0703     09/11/2020 0703        Component Value Date/Time    CALCIUM 9.3 09/11/2020 0703    ALKPHOS 48 (L) 09/11/2020 0703    AST 28 09/11/2020 0703    ALT 23 09/11/2020 0703    BILITOT 0.6 09/11/2020 0703    ESTGFRAFRICA >60.0 09/11/2020 0703    EGFRNONAA >60.0 09/11/2020 0703        TSH   Date Value Ref Range Status   09/11/2020 2.634 0.400 - 4.000 uIU/mL Final     Free T4   Date Value Ref Range Status   09/11/2020 0.81 0.71 - 1.51 ng/dL Final       Assessment:       1. History of cancer of lingual tonsil    2. Family history of colon cancer requiring screening colonoscopy        Plan:        1. He is doing well clinically with no evidence of recurrence. He is scheduled for a PET scan today.  Will return in 1year with labs. He follows with Dr. Mccray.  2. Order placed for colonoscopy. Phone number provided    Above care plan was discussed with patient and all questions were addressed to his satisfaction

## 2020-09-15 ENCOUNTER — TELEPHONE (OUTPATIENT)
Dept: OTOLARYNGOLOGY | Facility: CLINIC | Age: 50
End: 2020-09-15

## 2020-09-15 DIAGNOSIS — Z85.810 HISTORY OF CANCER OF LINGUAL TONSIL: Primary | ICD-10-CM

## 2021-03-23 RX ORDER — SILDENAFIL 100 MG/1
100 TABLET, FILM COATED ORAL DAILY PRN
Qty: 6 TABLET | Refills: 1 | Status: SHIPPED | OUTPATIENT
Start: 2021-03-23 | End: 2021-05-28

## 2021-05-28 ENCOUNTER — OFFICE VISIT (OUTPATIENT)
Dept: FAMILY MEDICINE | Facility: CLINIC | Age: 51
End: 2021-05-28
Payer: COMMERCIAL

## 2021-05-28 ENCOUNTER — TELEPHONE (OUTPATIENT)
Dept: GASTROENTEROLOGY | Facility: CLINIC | Age: 51
End: 2021-05-28

## 2021-05-28 ENCOUNTER — PATIENT OUTREACH (OUTPATIENT)
Dept: ADMINISTRATIVE | Facility: HOSPITAL | Age: 51
End: 2021-05-28

## 2021-05-28 VITALS
BODY MASS INDEX: 34.52 KG/M2 | HEART RATE: 90 BPM | DIASTOLIC BLOOD PRESSURE: 66 MMHG | TEMPERATURE: 98 F | OXYGEN SATURATION: 97 % | WEIGHT: 254.88 LBS | SYSTOLIC BLOOD PRESSURE: 110 MMHG | HEIGHT: 72 IN

## 2021-05-28 DIAGNOSIS — Z00.00 ROUTINE HEALTH MAINTENANCE: ICD-10-CM

## 2021-05-28 DIAGNOSIS — Z12.11 ENCOUNTER FOR SCREENING FOR MALIGNANT NEOPLASM OF COLON: Primary | ICD-10-CM

## 2021-05-28 DIAGNOSIS — Z01.818 PREOP EXAMINATION: Primary | ICD-10-CM

## 2021-05-28 PROCEDURE — 3008F BODY MASS INDEX DOCD: CPT | Mod: CPTII,S$GLB,, | Performed by: FAMILY MEDICINE

## 2021-05-28 PROCEDURE — 99396 PR PREVENTIVE VISIT,EST,40-64: ICD-10-PCS | Mod: S$GLB,,, | Performed by: FAMILY MEDICINE

## 2021-05-28 PROCEDURE — 1126F PR PAIN SEVERITY QUANTIFIED, NO PAIN PRESENT: ICD-10-PCS | Mod: S$GLB,,, | Performed by: FAMILY MEDICINE

## 2021-05-28 PROCEDURE — 3008F PR BODY MASS INDEX (BMI) DOCUMENTED: ICD-10-PCS | Mod: CPTII,S$GLB,, | Performed by: FAMILY MEDICINE

## 2021-05-28 PROCEDURE — 1126F AMNT PAIN NOTED NONE PRSNT: CPT | Mod: S$GLB,,, | Performed by: FAMILY MEDICINE

## 2021-05-28 PROCEDURE — 99396 PREV VISIT EST AGE 40-64: CPT | Mod: S$GLB,,, | Performed by: FAMILY MEDICINE

## 2021-05-28 RX ORDER — IBUPROFEN 200 MG
200 TABLET ORAL EVERY 6 HOURS PRN
COMMUNITY

## 2021-05-28 RX ORDER — METRONIDAZOLE 500 MG/1
500 TABLET ORAL 3 TIMES DAILY
Qty: 30 TABLET | Refills: 0 | Status: SHIPPED | OUTPATIENT
Start: 2021-05-28 | End: 2021-06-07

## 2021-05-28 RX ORDER — SILDENAFIL 100 MG/1
100 TABLET, FILM COATED ORAL DAILY PRN
Qty: 10 TABLET | Refills: 3 | Status: SHIPPED | OUTPATIENT
Start: 2021-05-28 | End: 2022-02-11

## 2021-05-31 RX ORDER — SODIUM, POTASSIUM,MAG SULFATES 17.5-3.13G
1 SOLUTION, RECONSTITUTED, ORAL ORAL DAILY
Qty: 1 KIT | Refills: 0 | Status: SHIPPED | OUTPATIENT
Start: 2021-05-31 | End: 2021-06-02

## 2021-06-04 ENCOUNTER — TELEPHONE (OUTPATIENT)
Dept: GASTROENTEROLOGY | Facility: CLINIC | Age: 51
End: 2021-06-04

## 2021-07-09 ENCOUNTER — TELEPHONE (OUTPATIENT)
Dept: GASTROENTEROLOGY | Facility: CLINIC | Age: 51
End: 2021-07-09

## 2022-02-11 RX ORDER — SILDENAFIL 100 MG/1
TABLET, FILM COATED ORAL
Qty: 10 TABLET | Refills: 0 | Status: SHIPPED | OUTPATIENT
Start: 2022-02-11 | End: 2022-04-26

## 2022-02-17 ENCOUNTER — OFFICE VISIT (OUTPATIENT)
Dept: ORTHOPEDICS | Facility: CLINIC | Age: 52
End: 2022-02-17
Payer: COMMERCIAL

## 2022-02-17 VITALS
WEIGHT: 267.31 LBS | DIASTOLIC BLOOD PRESSURE: 86 MMHG | OXYGEN SATURATION: 97 % | HEART RATE: 70 BPM | BODY MASS INDEX: 36.21 KG/M2 | SYSTOLIC BLOOD PRESSURE: 136 MMHG | HEIGHT: 72 IN

## 2022-02-17 DIAGNOSIS — M17.11 PRIMARY OSTEOARTHRITIS OF RIGHT KNEE: Primary | ICD-10-CM

## 2022-02-17 DIAGNOSIS — R60.0 LEG EDEMA, RIGHT: ICD-10-CM

## 2022-02-17 PROCEDURE — 99999 PR PBB SHADOW E&M-EST. PATIENT-LVL III: CPT | Mod: PBBFAC,,, | Performed by: ORTHOPAEDIC SURGERY

## 2022-02-17 PROCEDURE — 1159F MED LIST DOCD IN RCRD: CPT | Mod: CPTII,S$GLB,, | Performed by: ORTHOPAEDIC SURGERY

## 2022-02-17 PROCEDURE — 3008F BODY MASS INDEX DOCD: CPT | Mod: CPTII,S$GLB,, | Performed by: ORTHOPAEDIC SURGERY

## 2022-02-17 PROCEDURE — 3075F SYST BP GE 130 - 139MM HG: CPT | Mod: CPTII,S$GLB,, | Performed by: ORTHOPAEDIC SURGERY

## 2022-02-17 PROCEDURE — 3079F PR MOST RECENT DIASTOLIC BLOOD PRESSURE 80-89 MM HG: ICD-10-PCS | Mod: CPTII,S$GLB,, | Performed by: ORTHOPAEDIC SURGERY

## 2022-02-17 PROCEDURE — 99999 PR PBB SHADOW E&M-EST. PATIENT-LVL III: ICD-10-PCS | Mod: PBBFAC,,, | Performed by: ORTHOPAEDIC SURGERY

## 2022-02-17 PROCEDURE — 3079F DIAST BP 80-89 MM HG: CPT | Mod: CPTII,S$GLB,, | Performed by: ORTHOPAEDIC SURGERY

## 2022-02-17 PROCEDURE — 99203 OFFICE O/P NEW LOW 30 MIN: CPT | Mod: S$GLB,,, | Performed by: ORTHOPAEDIC SURGERY

## 2022-02-17 PROCEDURE — 3075F PR MOST RECENT SYSTOLIC BLOOD PRESS GE 130-139MM HG: ICD-10-PCS | Mod: CPTII,S$GLB,, | Performed by: ORTHOPAEDIC SURGERY

## 2022-02-17 PROCEDURE — 3008F PR BODY MASS INDEX (BMI) DOCUMENTED: ICD-10-PCS | Mod: CPTII,S$GLB,, | Performed by: ORTHOPAEDIC SURGERY

## 2022-02-17 PROCEDURE — 99203 PR OFFICE/OUTPT VISIT, NEW, LEVL III, 30-44 MIN: ICD-10-PCS | Mod: S$GLB,,, | Performed by: ORTHOPAEDIC SURGERY

## 2022-02-17 PROCEDURE — 1159F PR MEDICATION LIST DOCUMENTED IN MEDICAL RECORD: ICD-10-PCS | Mod: CPTII,S$GLB,, | Performed by: ORTHOPAEDIC SURGERY

## 2022-02-17 RX ORDER — DICLOFENAC SODIUM 10 MG/G
2 GEL TOPICAL 3 TIMES DAILY
Qty: 1 EACH | Refills: 2 | Status: SHIPPED | OUTPATIENT
Start: 2022-02-17 | End: 2023-10-12

## 2022-02-17 NOTE — PROGRESS NOTES
[facility], Orthopedics and Sports Medicine  [facility]    New Patient Office Visit  02/17/2022     Subjective:      Frankie Hoyt is a 51 y.o. male referred by Dr. Raymond Levi for evaluation and treatment of right knee pain..  This is evaluated as a personal injury.   The patient has the following symptoms: progressive worsening of right knee pain and deformity as well as gait disruption.  He notes a popping sensation in the knee occasionally, which is painful.     Patient also complains of swelling in the leg and pain in the popliteal fossa region for the last several days.  No difficulty breathing.     Outside reports reviewed: historical medical records.    Past Medical History:   Diagnosis Date    Anxiety     Cancer of tonsil     left tonsil    Diverticular disease 2012       Patient Active Problem List   Diagnosis    Diverticulitis of sigmoid colon    Abdominal pain, LLQ (left lower quadrant)    Family history of colon cancer requiring screening colonoscopy    History of cancer of lingual tonsil    Anxiety about health    Acute renal failure    Lymphedema of face    Primary osteoarthritis of right knee    Leg edema, right       Past Surgical History:   Procedure Laterality Date    COLONOSCOPY  2014    colonscopy  2012    LARYNGOSCOPY N/A 10/3/2018    Procedure: LARYNGOSCOPY;  Surgeon: Serge Mccray MD;  Location: [facility] OR [address] [place] [address];  Service: ENT;  Laterality: N/A;    tonsil biopsy 2018      TONSILLECTOMY Right 10/3/2018    Procedure: TONSILLECTOMY;  Surgeon: Serge Mccray MD;  Location: [facility] OR [address] [place] [address];  Service: ENT;  Laterality: Right;        Current Outpatient Medications   Medication Instructions    diclofenac sodium (VOLTAREN) 2 g, Topical (Top), 3 times daily    ibuprofen (ADVIL,MOTRIN) 200 mg, Oral, Every 6 hours PRN    sildenafiL (VIAGRA) 100 MG tablet TAKE ONE TABLET BY MOUTH EVERY DAY AS NEEDED        Review of patient's allergies indicates:  No  Known Allergies    Social History     Socioeconomic History    Marital status:    Tobacco Use    Smoking status: Former Smoker     Types: Cigars     Quit date:      Years since quittin.1    Smokeless tobacco: Former User    Tobacco comment: 1 per weekend   Substance and Sexual Activity    Alcohol use: Yes     Alcohol/week: 12.0 standard drinks     Types: 12 Cans of beer per week     Comment: weekend    Drug use: No    Sexual activity: Yes     Partners: Female   Social History Narrative     Frankie Hoyt lives with his wife and 2 younger children in [place], [place].  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  His spouse is Kym.   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 Frankie Hoyt's hobbies include working out       Family History   Problem Relation Age of Onset    Cancer Father         colon and prostate cancer    Heart disease Father          Review of Systems   Constitutional: Negative for chills and fever.   HENT: Negative for hearing loss.    Eyes: Negative for blurred vision.   Cardiovascular: Negative for chest pain.   Respiratory: Negative for shortness of breath.    Gastrointestinal: Negative for abdominal pain.   Neurological: Negative for light-headedness.          Objective:      General    Constitutional: He is oriented to person, place, and time. He appears well-developed and well-nourished.   HENT:   Head: Normocephalic and atraumatic.   Eyes: EOM are normal.   Cardiovascular: Normal rate.    Pulmonary/Chest: Effort normal.   Neurological: He is alert and oriented to person, place, and time.   Psychiatric: He has a normal mood and affect.     General Musculoskeletal Exam   Gait: antalgic       Right Knee Exam     Inspection   Erythema: absent  Swelling: absent  Effusion: absent    Tenderness   The patient is tender to palpation of the medial joint line and lateral  joint line.    Crepitus   The patient has crepitus of the patella.    Range of Motion   Extension: 0   Flexion: 90     Tests   Ligament Examination Lachman: normal (-1 to 2mm) PCL-Posterior Drawer: normal (0 to 2mm)     MCL - Valgus: normal (0 to 2mm)  LCL - Varus: normal    Other   Sensation: normal    Comments:  Loose body sensation palpable lateral anterior knee    Left Knee Exam     Inspection   Erythema: absent  Swelling: absent  Effusion: absent    Tenderness   The patient is experiencing no tenderness.     Range of Motion   Extension: 0   Flexion: 120     Tests   Meniscus   Yared:  Medial - negative Lateral - negative  Stability Lachman: normal (-1 to 2mm) PCL-Posterior Drawer: normal (0 to 2mm)  MCL - Valgus: normal (0 to 2mm)  LCL - Varus: normal (0 to 2mm)  Patella   Patellar apprehension: negative    Other   Sensation: normal    Muscle Strength   Right Lower Extremity   Quadriceps:  5/5   Hamstrin/5   Left Lower Extremity   Quadriceps:  5/5   Hamstrin/5     Vascular Exam     Right Pulses    Posterior Tibial:      2+        Left Pulses    Posterior Tibial:      2+        Edema  Right Lower Leg: present      Imaging:  None available.     Procedures        Assessment:       Frankie Hoyt is a 51 y.o. male seen in the office today. The primary encounter diagnosis was Primary osteoarthritis of right knee. A diagnosis of Leg edema, right was also pertinent to this visit.  Further work-up is recommended at this time. The patient is being sent for xrays to assess the knee.  The patient also is being sent for stat ultrasound DVT study of the right leg to assess for DVT given his leg swelling.  He can come back to see me in the office after his xray to discuss possibility of injection in the knee. The natural history and expected course discussed with patient. Various treatment options were discussed, including their risks and benefits. All of the patient's questions were answered.     Plan:       1. Tylenol 650mg TID, PRN pain.  2. Voltaren 1% topical gel, apply to affected area TID, PRN pain.   3. Doppler US DVT study right leg  4. Xray right knee  5. Follow up after imaging studies         Karlos Mcelroy IV, MD   of Clinical Orthopedics  Department of Orthopedic Surgery  [facility]  Office: [phone]  Website: [email or web address]      Orders Placed This Encounter    X-Ray Knee Complete 4 Or More Views Right    CV Ultrasound doppler venous DVT leg right    diclofenac sodium (VOLTAREN) 1 % Gel

## 2022-02-18 ENCOUNTER — PATIENT MESSAGE (OUTPATIENT)
Dept: ORTHOPEDICS | Facility: CLINIC | Age: 52
End: 2022-02-18
Payer: COMMERCIAL

## 2022-02-18 ENCOUNTER — HOSPITAL ENCOUNTER (OUTPATIENT)
Dept: RADIOLOGY | Facility: HOSPITAL | Age: 52
Discharge: HOME | End: 2022-02-18
Attending: ORTHOPAEDIC SURGERY
Payer: COMMERCIAL

## 2022-02-18 DIAGNOSIS — M17.11 PRIMARY OSTEOARTHRITIS OF RIGHT KNEE: ICD-10-CM

## 2022-02-18 DIAGNOSIS — R60.0 LEG EDEMA, RIGHT: ICD-10-CM

## 2022-02-18 PROCEDURE — 73564 X-RAY EXAM KNEE 4 OR MORE: CPT | Mod: TC,FY,PO,RT

## 2022-02-18 PROCEDURE — 93971 EXTREMITY STUDY: CPT | Mod: TC,PO,RT

## 2022-02-21 ENCOUNTER — OFFICE VISIT (OUTPATIENT)
Dept: ORTHOPEDICS | Facility: CLINIC | Age: 52
End: 2022-02-21
Payer: COMMERCIAL

## 2022-02-21 VITALS
HEIGHT: 72 IN | BODY MASS INDEX: 36.31 KG/M2 | DIASTOLIC BLOOD PRESSURE: 85 MMHG | WEIGHT: 268.06 LBS | HEART RATE: 65 BPM | SYSTOLIC BLOOD PRESSURE: 150 MMHG

## 2022-02-21 DIAGNOSIS — M17.11 PRIMARY OSTEOARTHRITIS OF RIGHT KNEE: Primary | ICD-10-CM

## 2022-02-21 PROCEDURE — 20610 DRAIN/INJ JOINT/BURSA W/O US: CPT | Mod: RT,S$GLB,, | Performed by: ORTHOPAEDIC SURGERY

## 2022-02-21 PROCEDURE — 1159F PR MEDICATION LIST DOCUMENTED IN MEDICAL RECORD: ICD-10-PCS | Mod: CPTII,S$GLB,, | Performed by: ORTHOPAEDIC SURGERY

## 2022-02-21 PROCEDURE — 1159F MED LIST DOCD IN RCRD: CPT | Mod: CPTII,S$GLB,, | Performed by: ORTHOPAEDIC SURGERY

## 2022-02-21 PROCEDURE — 99999 PR PBB SHADOW E&M-EST. PATIENT-LVL III: CPT | Mod: PBBFAC,,, | Performed by: ORTHOPAEDIC SURGERY

## 2022-02-21 PROCEDURE — 3079F DIAST BP 80-89 MM HG: CPT | Mod: CPTII,S$GLB,, | Performed by: ORTHOPAEDIC SURGERY

## 2022-02-21 PROCEDURE — 3008F PR BODY MASS INDEX (BMI) DOCUMENTED: ICD-10-PCS | Mod: CPTII,S$GLB,, | Performed by: ORTHOPAEDIC SURGERY

## 2022-02-21 PROCEDURE — 3077F SYST BP >= 140 MM HG: CPT | Mod: CPTII,S$GLB,, | Performed by: ORTHOPAEDIC SURGERY

## 2022-02-21 PROCEDURE — 3079F PR MOST RECENT DIASTOLIC BLOOD PRESSURE 80-89 MM HG: ICD-10-PCS | Mod: CPTII,S$GLB,, | Performed by: ORTHOPAEDIC SURGERY

## 2022-02-21 PROCEDURE — 99999 PR PBB SHADOW E&M-EST. PATIENT-LVL III: ICD-10-PCS | Mod: PBBFAC,,, | Performed by: ORTHOPAEDIC SURGERY

## 2022-02-21 PROCEDURE — 99214 PR OFFICE/OUTPT VISIT, EST, LEVL IV, 30-39 MIN: ICD-10-PCS | Mod: 25,S$GLB,, | Performed by: ORTHOPAEDIC SURGERY

## 2022-02-21 PROCEDURE — 20610 LARGE JOINT ASPIRATION/INJECTION: R KNEE: ICD-10-PCS | Mod: RT,S$GLB,, | Performed by: ORTHOPAEDIC SURGERY

## 2022-02-21 PROCEDURE — 99214 OFFICE O/P EST MOD 30 MIN: CPT | Mod: 25,S$GLB,, | Performed by: ORTHOPAEDIC SURGERY

## 2022-02-21 PROCEDURE — 3008F BODY MASS INDEX DOCD: CPT | Mod: CPTII,S$GLB,, | Performed by: ORTHOPAEDIC SURGERY

## 2022-02-21 PROCEDURE — 3077F PR MOST RECENT SYSTOLIC BLOOD PRESSURE >= 140 MM HG: ICD-10-PCS | Mod: CPTII,S$GLB,, | Performed by: ORTHOPAEDIC SURGERY

## 2022-02-21 RX ORDER — TRIAMCINOLONE ACETONIDE 40 MG/ML
40 INJECTION, SUSPENSION INTRA-ARTICULAR; INTRAMUSCULAR
Status: DISCONTINUED | OUTPATIENT
Start: 2022-02-21 | End: 2022-02-21 | Stop reason: HOSPADM

## 2022-02-21 RX ADMIN — TRIAMCINOLONE ACETONIDE 40 MG: 40 INJECTION, SUSPENSION INTRA-ARTICULAR; INTRAMUSCULAR at 08:02

## 2022-02-21 NOTE — PROGRESS NOTES
[facility], Orthopedics and Sports Medicine  [facility]    Established Patient Knee Office Visit  02/21/2022     Diagnosis:  Right knee osteoarthritis       Subjective:      Frankie Hoyt is a 51 y.o. male who returns for evaluation and treatment of the right knee.    The patient has the following symptoms: giving out, pain located medial, lateral knee on the right side and stiffness. The symptoms are unchanged.    I saw the patient last week and was concerned for a DVT so sent him for a scan.  He also had xrays done after the visit and presents to review all these studies.      His leg swelling has resolved compared to last visit.  His knee pain is somewhat better. He still has a limp.      He has had a CSI several years ago and had many  Years of pain relief after that.     Outside reports reviewed: historical medical records and office notes.  I personally reviewed the Doppler ultrasound DVT study and it was negative for DVT.    Past Medical History:   Diagnosis Date    Anxiety     Cancer of tonsil     left tonsil    Diverticular disease 2012       Patient Active Problem List   Diagnosis    Diverticulitis of sigmoid colon    Abdominal pain, LLQ (left lower quadrant)    Family history of colon cancer requiring screening colonoscopy    History of cancer of lingual tonsil    Anxiety about health    Acute renal failure    Lymphedema of face    Primary osteoarthritis of right knee    Leg edema, right       Past Surgical History:   Procedure Laterality Date    COLONOSCOPY  2014    colonscopy  2012    LARYNGOSCOPY N/A 10/3/2018    Procedure: LARYNGOSCOPY;  Surgeon: Serge Mccray MD;  Location: [facility] OR [address] [place] [address];  Service: ENT;  Laterality: N/A;    tonsil biopsy 2018      TONSILLECTOMY Right 10/3/2018    Procedure: TONSILLECTOMY;  Surgeon: Serge Mccray MD;  Location: [facility] OR [address] [place] [address];  Service: ENT;  Laterality: Right;        Current Outpatient Medications   Medication  Instructions    diclofenac sodium (VOLTAREN) 2 g, Topical (Top), 3 times daily    ibuprofen (ADVIL,MOTRIN) 200 mg, Oral, Every 6 hours PRN    sildenafiL (VIAGRA) 100 MG tablet TAKE ONE TABLET BY MOUTH EVERY DAY AS NEEDED        Review of patient's allergies indicates:  No Known Allergies    Social History     Socioeconomic History    Marital status:    Tobacco Use    Smoking status: Former Smoker     Types: Cigars     Quit date:      Years since quittin.1    Smokeless tobacco: Former User    Tobacco comment: 1 per weekend   Substance and Sexual Activity    Alcohol use: Yes     Alcohol/week: 12.0 standard drinks     Types: 12 Cans of beer per week     Comment: weekend    Drug use: No    Sexual activity: Yes     Partners: Female   Social History Narrative     Frankie Hoyt lives with his wife and 2 younger children in [place], [place].  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  His spouse is Kym.   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 Frankie Hoyt's hobbies include working out       Family History   Problem Relation Age of Onset    Cancer Father         colon and prostate cancer    Heart disease Father          Review of Systems   Constitutional: Negative for chills and fever.   HENT: Negative for hearing loss.    Eyes: Negative for blurred vision.   Cardiovascular: Negative for chest pain.   Respiratory: Negative for shortness of breath.    Gastrointestinal: Negative for abdominal pain.   Neurological: Negative for light-headedness.          Objective:      General    Constitutional: He is oriented to person, place, and time. He appears well-developed and well-nourished.   HENT:   Head: Normocephalic and atraumatic.   Eyes: EOM are normal.   Cardiovascular: Normal rate.    Pulmonary/Chest: Effort normal.   Neurological: He is alert and oriented to person, place, and time.    Psychiatric: He has a normal mood and affect.     General Musculoskeletal Exam   Gait: antalgic       Right Knee Exam     Inspection   Erythema: absent  Swelling: absent  Effusion: absent    Tenderness   The patient is tender to palpation of the medial joint line and lateral joint line.    Crepitus   The patient has crepitus of the patella.    Range of Motion   Extension: 0   Flexion: 100     Tests   Ligament Examination Lachman: normal (-1 to 2mm) PCL-Posterior Drawer: normal (0 to 2mm)     MCL - Valgus: normal (0 to 2mm)  LCL - Varus: normal    Other   Sensation: normal    Comments:  Loose body sensation palpable lateral anterior knee    Left Knee Exam     Inspection   Erythema: absent  Swelling: absent  Effusion: absent    Tenderness   The patient is experiencing no tenderness.     Range of Motion   Extension: 0   Flexion: 120     Tests   Meniscus   Yared:  Medial - negative Lateral - negative  Stability Lachman: normal (-1 to 2mm) PCL-Posterior Drawer: normal (0 to 2mm)  MCL - Valgus: normal (0 to 2mm)  LCL - Varus: normal (0 to 2mm)  Patella   Patellar apprehension: negative    Other   Sensation: normal    Muscle Strength   Right Lower Extremity   Quadriceps:  5/5   Hamstrin/5   Left Lower Extremity   Quadriceps:  5/5   Hamstrin/5     Vascular Exam     Right Pulses    Posterior Tibial:      2+        Left Pulses    Posterior Tibial:      2+        Edema  Right Lower Leg: absent      Imaging:  Radiographs of the right knee taken taken 2022 were personally reviewed from the [facility].  Multiple views of the knee are available today for review, including a standing AP, a standing notch view, lateral view, and a merchant view.  The tibiofemoral compartment demonstrates moderate to severe degenerative changes.  The patellofemoral compartment demonstrates severe degenerative changes.  No acute fractures or dislocations are noted in these images.  There are apparent loose bodies and  osteophytes noted as well as chondrocalcinosis of the menisci.         Large Joint Aspiration/Injection: R knee    Date/Time: 2/21/2022 8:45 AM  Performed by: Karlos Mcelroy IV, MD  Authorized by: Karlos Mcelroy IV, MD     Consent Done?:  Yes (Verbal)  Indications:  Pain  Site marked: the procedure site was marked    Timeout: prior to procedure the correct patient, procedure, and site was verified    Prep: patient was prepped and draped in usual sterile fashion      Local anesthesia used?: Yes    Local anesthetic:  Lidocaine 1% without epinephrine    Details:  Needle Size:  22 G  Ultrasonic Guidance for needle placement?: No    Approach:  Anterolateral  Location:  Knee  Site:  R knee  Medications:  40 mg triamcinolone acetonide 40 mg/mL  Patient tolerance:  Patient tolerated the procedure well with no immediate complications            Assessment:       Frankie Hoyt is a 51 y.o. male seen in the office today. The encounter diagnosis was Primary osteoarthritis of right knee.  Non-operative treatment is recommended at this time.  Discussed the severity of his knee problem, which is more advanced than expected for his age.  Discussed injection therapy versus assessing his knee with an MRI.  Patient will pay attention to his symptoms and see if has sensation of loose body inside knee.  Will call us in 6 weeks if needs another appointment.   The natural history and expected course discussed with patient. Various treatment options were discussed, including their risks and benefits. All of the patient's questions were answered.       Plan:      1. Tylenol 650mg TID, PRN pain.  2. Voltaren 1% topical gel, apply to affected area TID, PRN pain.  3. Follow up in 6 weeks.   4. Corticosteroid injection given today (right knee).   5. Home exercise program given.          Karlos Mcelroy IV, MD   of Clinical Orthopedics  Department of Orthopedic Surgery  [facility]  Office:  [phone]  Website: [email or web address]    ---------------------------------------  Orders Placed This Encounter   Procedures    Large Joint Aspiration/Injection

## 2022-04-25 NOTE — TELEPHONE ENCOUNTER
No new care gaps identified.  Powered by [employer]. Reference number: [id number].   4/25/2022 1:21:23 PM CDT

## 2022-04-26 RX ORDER — SILDENAFIL 100 MG/1
TABLET, FILM COATED ORAL
Qty: 10 TABLET | Refills: 0 | Status: SHIPPED | OUTPATIENT
Start: 2022-04-26 | End: 2022-06-02

## 2022-04-26 NOTE — TELEPHONE ENCOUNTER
Refill Routing Note   Medication(s) are not appropriate for processing by [facility] for the following reason(s):      - Patient displays non-adherence to medications (has run out of medication supply over 6 months ago)    [facility] action(s):  Defer          Medication reconciliation completed: No     Appointments  past 12m or future 3m with PCP    Date Provider   Last Visit   5/28/2021 Raymond Levi MD   Next Visit   Visit date not found Raymond Levi MD   ED visits in past 90 days: 0        Note composed:12:31 PM 04/26/2022

## 2022-05-31 ENCOUNTER — PATIENT MESSAGE (OUTPATIENT)
Dept: ADMINISTRATIVE | Facility: HOSPITAL | Age: 52
End: 2022-05-31
Payer: COMMERCIAL

## 2022-06-14 ENCOUNTER — TELEPHONE (OUTPATIENT)
Dept: GASTROENTEROLOGY | Facility: CLINIC | Age: 52
End: 2022-06-14
Payer: COMMERCIAL

## 2022-06-14 DIAGNOSIS — Z01.818 PRE-OP TESTING: ICD-10-CM

## 2022-06-14 NOTE — LETTER
June 14, 2022    Frankie Hoyt  [address] [address]             [facility] - Gastroenterology  [address], [place]   [facility] [address]  Phone: [phone]  Fax: [phone] Dear Mr. Hoyt:    MAGNESIUM CITRATE Instructions    You are scheduled for a colonoscopy with Dr. Saul on 8/4/22 at [facility]. Enter through the [facility] Entrance and check in at Same Day Surgery.      To ensure that your test is accurate and complete, you MUST follow these instructions listed below.  If you have any questions, please call our office at [phone].  Plan on being at the hospital for your procedure for 3-4 hours.    1.  Follow a CLEAR LIQUID DIET for the entire day before your scheduled colonoscopy.  This means no solid food the entire day starting when you wake.  You may have as much of the clear liquids as you want throughout the day.   CLEAR LIQUID DIET:   - Avoid Red, Orange, Purple, and/or Blue food coloring   - NO DAIRY   - You can have:  Coffee with sugar (no creamer), tea, water, soda, apple or white grape juice, chicken or beef broth/bouillon (no meat, noodles, or veggies), green/yellow popsicles, green/yellow Jell-O, lemonade.    2.  Buy 3 bottles of magnesium citrate from your pharmacy.  It is on the bottom shelf of the laxative section.  The lemon-lime flavor is best, and should be placed in the refrigerator 2 days before the procedure.    3. AT 5 PM THE DAY BEFORE YOUR COLONOSCOPY, DRINK ONE BOTTLE OF MAGNESIUM CITRATE FOLLOWED BY 16 OUNCES OF WATER OVER THE NEXT HOUR.      4.  AT 7 PM THE DAY BEFORE YOUR COLONOSCOPY, DRINK THE SECOND BOTTLE OF MAGNESIUM CITRATE FOLLOWED BY 16 OUNCES OF WATER OVER THE NEXT HOUR.     5.  The endoscopy department will call you 1 day before your colonoscopy to tell you the exact time to arrive, AND to tell you the exact time to drink the 3rd bottle of magnesium citrate (which will be FIVE HOURS BEFORE YOUR ARRIVAL TIME) FOLLOWED  BY 16 OUNCES OF WATER OVER THE NEXT HOUR.  At this time given to you, DRINK THE THIRD BOTTLE OF MAGNESIUM CITRATE!  Nothing to eat or drink after you drink that third bottle!!    6.  It is ok to take MOST of your REGULAR MEDICATIONS  in the morning of your test with a SIP of water.  THE ONLY MEDS YOU NEED TO HOLD ARE YOUR DIABETES MEDICATIONS,  SOME BLOOD PRESSURE MEDS, AND BLOOD THINNERS IF OK'D BY YOUR DOCTOR.  Do NOT have anything else to eat or drink the morning of your colonoscopy.  It is ok to brush your teeth.    7.  If you are on blood thinners THAT YOU HAVE BEEN INSTRUCTED TO HOLD BY YOUR DOCTOR FOR THIS PROCEDURE, then do NOT take this the morning of your colonoscopy.  Do NOT stop these medications on your own, they must be approved to be held by your doctor.  Your colonoscopy can NOT be done if you are on these medications.  Examples of blood thinners include: Coumadin, Aggrenox, Plavix, Pradaxa, Reapro, Pletal, Xarelto, Ticagrelor, Brilinta, Eliquis, and high dose aspirin (325 mg).  You do not have to stop baby aspirin 81 mg.    8.  IF YOU ARE DIABETIC:  NO INSULIN OR ORAL MEDICATIONS THE MORNING OF THE COLONOSCOPY.  TAKE ONLY HALF THE DOSE OF YOUR INSULIN THE DAY BEFORE THE COLONOSCOPY.  DO NOT TAKE ANY ORAL DIABETIC MEDICATIONS THE DAY BEFORE THE COLONOSCOPY.  IF YOU ARE AN INSULIN DEPENDENT DIABETIC WITH UNSTABLE BLOOD SUGARS, NOTIFY YOUR PRIMARY CARE PHYSICIAN FOR INSTRUCTIONS.    9.  Please DO use your inhalers the morning of your procedure.  **      If you have any questions or concerns, please don't hesitate to call.    Please report to [facility] on Aug 1, 2022 Suite  between 8:00 and 10:00 for your pre-op Covid-19 test.       Sincerely,    Lawanad Saul MD

## 2022-06-14 NOTE — TELEPHONE ENCOUNTER
"Referring Physician:                              Date: 6/14/22    Reason for Referral: >5 years since last        Family History of:   Colon polyp: unknown  Relationship/Age of Onset:       Colon cancer: father  Relationship/Age of Onset: 63      Patient with:   Hemoccults Done: no      Iron deficient: unknown      On Blood Thinner: no      Valvular heart disease/valve replacement: no      Anemia Present: no      On NSAID: yes advil every day 800-1200 per day    On Adipex or phentermine: "super shredded 8" (OTC) appetite suppressant      Lung disease: no      Kidney disease: no      Hx of polyps: no      Hx of colon cancer: no      Previous colon evalations: yes  When: 2014  Where: [facility] surgical  Pertinent symptoms: no          Review of patient's allergies indicates: NKDA        Patient was scheduled for colonoscopy on  8/4/2022     with Dr. aSul at [facility].       instructions were reviewed with patient.     MAGNESIUM CITRATE Instructions    You are scheduled for a colonoscopy with Dr. Saul on 8/4/22 at [facility]. Enter through the [facility] and check in at Same Day Surgery.      To ensure that your test is accurate and complete, you MUST follow these instructions listed below.  If you have any questions, please call our office at [phone].  Plan on being at the hospital for your procedure for 3-4 hours.    1.  Follow a CLEAR LIQUID DIET for the entire day before your scheduled colonoscopy.  This means no solid food the entire day starting when you wake.  You may have as much of the clear liquids as you want throughout the day.   CLEAR LIQUID DIET:   - Avoid Red, Orange, Purple, and/or Blue food coloring   - NO DAIRY   - You can have:  Coffee with sugar (no creamer), tea, water, soda, apple or white grape juice, chicken or beef broth/bouillon (no meat, noodles, or veggies), green/yellow popsicles, green/yellow Jell-O, lemonade.    2.  Buy 3 bottles of magnesium " citrate from your pharmacy.  It is on the bottom shelf of the laxative section.  The lemon-lime flavor is best, and should be placed in the refrigerator 2 days before the procedure.    3. AT 5 PM THE DAY BEFORE YOUR COLONOSCOPY, DRINK ONE BOTTLE OF MAGNESIUM CITRATE FOLLOWED BY 16 OUNCES OF WATER OVER THE NEXT HOUR.      4.  AT 7 PM THE DAY BEFORE YOUR COLONOSCOPY, DRINK THE SECOND BOTTLE OF MAGNESIUM CITRATE FOLLOWED BY 16 OUNCES OF WATER OVER THE NEXT HOUR.     5.  The endoscopy department will call you 1 day before your colonoscopy to tell you the exact time to arrive, AND to tell you the exact time to drink the 3rd bottle of magnesium citrate (which will be FIVE HOURS BEFORE YOUR ARRIVAL TIME) FOLLOWED BY 16 OUNCES OF WATER OVER THE NEXT HOUR.  At this time given to you, DRINK THE THIRD BOTTLE OF MAGNESIUM CITRATE!  Nothing to eat or drink after you drink that third bottle!!    6.  It is ok to take MOST of your REGULAR MEDICATIONS  in the morning of your test with a SIP of water.  THE ONLY MEDS YOU NEED TO HOLD ARE YOUR DIABETES MEDICATIONS,  SOME BLOOD PRESSURE MEDS, AND BLOOD THINNERS IF OK'D BY YOUR DOCTOR.  Do NOT have anything else to eat or drink the morning of your colonoscopy.  It is ok to brush your teeth.    7.  If you are on blood thinners THAT YOU HAVE BEEN INSTRUCTED TO HOLD BY YOUR DOCTOR FOR THIS PROCEDURE, then do NOT take this the morning of your colonoscopy.  Do NOT stop these medications on your own, they must be approved to be held by your doctor.  Your colonoscopy can NOT be done if you are on these medications.  Examples of blood thinners include: Coumadin, Aggrenox, Plavix, Pradaxa, Reapro, Pletal, Xarelto, Ticagrelor, Brilinta, Eliquis, and high dose aspirin (325 mg).  You do not have to stop baby aspirin 81 mg.    8.  IF YOU ARE DIABETIC:  NO INSULIN OR ORAL MEDICATIONS THE MORNING OF THE COLONOSCOPY.  TAKE ONLY HALF THE DOSE OF YOUR INSULIN THE DAY BEFORE THE COLONOSCOPY.  DO NOT TAKE  ANY ORAL DIABETIC MEDICATIONS THE DAY BEFORE THE COLONOSCOPY.  IF YOU ARE AN INSULIN DEPENDENT DIABETIC WITH UNSTABLE BLOOD SUGARS, NOTIFY YOUR PRIMARY CARE PHYSICIAN FOR INSTRUCTIONS.    9.  Please DO use your inhalers the morning of your procedure.

## 2022-06-15 ENCOUNTER — OFFICE VISIT (OUTPATIENT)
Dept: ORTHOPEDICS | Facility: CLINIC | Age: 52
End: 2022-06-15
Payer: COMMERCIAL

## 2022-06-15 VITALS
BODY MASS INDEX: 36.11 KG/M2 | WEIGHT: 266.63 LBS | DIASTOLIC BLOOD PRESSURE: 96 MMHG | HEIGHT: 72 IN | HEART RATE: 96 BPM | SYSTOLIC BLOOD PRESSURE: 160 MMHG

## 2022-06-15 DIAGNOSIS — M17.11 PRIMARY OSTEOARTHRITIS OF RIGHT KNEE: Primary | ICD-10-CM

## 2022-06-15 PROCEDURE — 3008F PR BODY MASS INDEX (BMI) DOCUMENTED: ICD-10-PCS | Mod: CPTII,S$GLB,, | Performed by: ORTHOPAEDIC SURGERY

## 2022-06-15 PROCEDURE — 99213 OFFICE O/P EST LOW 20 MIN: CPT | Mod: S$GLB,,, | Performed by: ORTHOPAEDIC SURGERY

## 2022-06-15 PROCEDURE — 99213 PR OFFICE/OUTPT VISIT, EST, LEVL III, 20-29 MIN: ICD-10-PCS | Mod: S$GLB,,, | Performed by: ORTHOPAEDIC SURGERY

## 2022-06-15 PROCEDURE — 3008F BODY MASS INDEX DOCD: CPT | Mod: CPTII,S$GLB,, | Performed by: ORTHOPAEDIC SURGERY

## 2022-06-15 PROCEDURE — 1159F MED LIST DOCD IN RCRD: CPT | Mod: CPTII,S$GLB,, | Performed by: ORTHOPAEDIC SURGERY

## 2022-06-15 PROCEDURE — 3080F PR MOST RECENT DIASTOLIC BLOOD PRESSURE >= 90 MM HG: ICD-10-PCS | Mod: CPTII,S$GLB,, | Performed by: ORTHOPAEDIC SURGERY

## 2022-06-15 PROCEDURE — 3080F DIAST BP >= 90 MM HG: CPT | Mod: CPTII,S$GLB,, | Performed by: ORTHOPAEDIC SURGERY

## 2022-06-15 PROCEDURE — 99999 PR PBB SHADOW E&M-EST. PATIENT-LVL III: CPT | Mod: PBBFAC,,, | Performed by: ORTHOPAEDIC SURGERY

## 2022-06-15 PROCEDURE — 99999 PR PBB SHADOW E&M-EST. PATIENT-LVL III: ICD-10-PCS | Mod: PBBFAC,,, | Performed by: ORTHOPAEDIC SURGERY

## 2022-06-15 PROCEDURE — 3077F SYST BP >= 140 MM HG: CPT | Mod: CPTII,S$GLB,, | Performed by: ORTHOPAEDIC SURGERY

## 2022-06-15 PROCEDURE — 3077F PR MOST RECENT SYSTOLIC BLOOD PRESSURE >= 140 MM HG: ICD-10-PCS | Mod: CPTII,S$GLB,, | Performed by: ORTHOPAEDIC SURGERY

## 2022-06-15 PROCEDURE — 1159F PR MEDICATION LIST DOCUMENTED IN MEDICAL RECORD: ICD-10-PCS | Mod: CPTII,S$GLB,, | Performed by: ORTHOPAEDIC SURGERY

## 2022-06-15 NOTE — PROGRESS NOTES
[facility], Orthopedics and Sports Medicine  [facility]    Established Patient Knee Office Visit  06/15/2022     Diagnosis:  Right knee osteoarthritis    Procedure:   (2/21/2022) right knee CSI    Subjective:      Frankie Hoyt is a 51 y.o. male who returns for evaluation and treatment of the right knee.    The patient has the following symptoms: pain located globally about right knee. The symptoms also include mechanical sensation of something stuck or moving around in the knee, which is painful.    The patient previously had a CSI of the knee which gave minimal pain relief.     Outside reports reviewed: historical medical records and office notes.    Past Medical History:   Diagnosis Date    Anxiety     Cancer of tonsil     left tonsil    Diverticular disease 2012       Patient Active Problem List   Diagnosis    Diverticulitis of sigmoid colon    Abdominal pain, LLQ (left lower quadrant)    Family history of colon cancer requiring screening colonoscopy    History of cancer of lingual tonsil    Anxiety about health    Acute renal failure    Lymphedema of face    Primary osteoarthritis of right knee    Leg edema, right       Past Surgical History:   Procedure Laterality Date    COLONOSCOPY  2014    colonscopy  2012    LARYNGOSCOPY N/A 10/3/2018    Procedure: LARYNGOSCOPY;  Surgeon: Serge Mccray MD;  [place] [facility] [place] [address] [place] [address];  Service: ENT;  Laterality: N/A;    tonsil biopsy 2018      TONSILLECTOMY Right 10/3/2018    Procedure: TONSILLECTOMY;  Surgeon: Serge Mccray MD;  [place] [facility] [place] [address] [place] [address];  Service: ENT;  Laterality: Right;        Current Outpatient Medications   Medication Instructions    diclofenac sodium (VOLTAREN) 2 g, Topical (Top), 3 times daily    ibuprofen (ADVIL,MOTRIN) 200 mg, Oral, Every 6 hours PRN    sildenafiL (VIAGRA) 100 MG tablet TAKE ONE TABLET BY MOUTH EVERY DAY AS NEEDED        Review of patient's allergies indicates:  No Known  Allergies    Social History     Socioeconomic History    Marital status:    Tobacco Use    Smoking status: Former Smoker     Types: Cigars     Quit date:      Years since quittin.4    Smokeless tobacco: Former User    Tobacco comment: 1 per weekend   Substance and Sexual Activity    Alcohol use: Yes     Alcohol/week: 12.0 standard drinks     Types: 12 Cans of beer per week     Comment: weekend    Drug use: No    Sexual activity: Yes     Partners: Female   Social History Narrative     Frankie Hoyt lives with his wife and 2 younger children in [place], [place].  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  His spouse is Kym.   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 Frankie Hoyt's hobbies include working out       Family History   Problem Relation Age of Onset    Cancer Father         colon and prostate cancer    Heart disease Father          Review of Systems   Constitutional: Negative for chills and fever.   HENT: Negative for hearing loss.    Eyes: Negative for blurred vision.   Cardiovascular: Negative for chest pain.   Respiratory: Negative for shortness of breath.    Gastrointestinal: Negative for abdominal pain.   Neurological: Negative for light-headedness.          Objective:      General    Constitutional: He is oriented to person, place, and time. He appears well-developed and well-nourished.   HENT:   Head: Normocephalic and atraumatic.   Eyes: EOM are normal.   Cardiovascular: Normal rate.    Pulmonary/Chest: Effort normal.   Neurological: He is alert and oriented to person, place, and time.   Psychiatric: He has a normal mood and affect.     General Musculoskeletal Exam   Gait: antalgic       Right Knee Exam     Inspection   Erythema: absent  Swelling: present  Effusion: present    Tenderness   The patient is tender to palpation of the medial joint line and lateral joint  line.    Crepitus   The patient has crepitus of the patella.    Range of Motion   Extension: 5   Flexion: 90     Tests   Ligament Examination Lachman: normal (-1 to 2mm) PCL-Posterior Drawer: normal (0 to 2mm)     MCL - Valgus: normal (0 to 2mm)  LCL - Varus: normal    Other   Sensation: normal    Left Knee Exam     Inspection   Erythema: absent  Swelling: absent  Effusion: absent    Tenderness   The patient is experiencing no tenderness.     Range of Motion   Extension: 0   Flexion: 130     Tests   Meniscus   Yared:  Medial - negative Lateral - negative  Stability Lachman: normal (-1 to 2mm) PCL-Posterior Drawer: normal (0 to 2mm)  MCL - Valgus: normal (0 to 2mm)  LCL - Varus: normal (0 to 2mm)    Other   Sensation: normal    Muscle Strength   Right Lower Extremity   Quadriceps:  5/5   Hamstrin/5   Left Lower Extremity   Quadriceps:  5/5   Hamstrin/5     Vascular Exam     Right Pulses    Posterior Tibial:      2+        Left Pulses    Posterior Tibial:      2+            Imaging:  Radiographs of the right knee taken taken 2022 were personally reviewed from the [facility]MR.  Multiple views of the knee are available today for review, including a standing AP, a standing notch view, lateral view, and a merchant view.  The tibiofemoral compartment demonstrates moderate to severe degenerative changes.  The patellofemoral compartment demonstrates severe degenerative changes.  No acute fractures or dislocations are noted in these images.  There are apparent loose bodies and osteophytes noted as well as chondrocalcinosis of the menisci. There are KD 4 changes noted in the tibiofemoral and patellofemoral joint.     Procedures        Assessment:       Frankie Hoyt is a 51 y.o. male seen in the office today. The encounter diagnosis was Primary osteoarthritis of right knee.  Non-operative treatment is recommended at this time.  The patient was offered arthroscopy to remove a loose body seen on the xray  but he declined at this time.  He wants to try gel injection, which was ordered today.  The natural history and expected course discussed with patient. Various treatment options were discussed, including their risks and benefits. All of the patient's questions were answered.       Plan:      1. Prior auth for gel injection  2. Return to clinic in 1 week for injection administration          Karlos Mcelroy IV, MD   of Clinical Orthopedics  Department of Orthopedic Surgery  [facility]  Office: [phone]  Website: [email or web address]    ---------------------------------------  Orders Placed This Encounter   Procedures    Prior authorization Order

## 2022-06-23 ENCOUNTER — PROCEDURE VISIT (OUTPATIENT)
Dept: ORTHOPEDICS | Facility: CLINIC | Age: 52
End: 2022-06-23
Payer: COMMERCIAL

## 2022-06-23 VITALS
SYSTOLIC BLOOD PRESSURE: 159 MMHG | HEIGHT: 72 IN | DIASTOLIC BLOOD PRESSURE: 90 MMHG | HEART RATE: 78 BPM | WEIGHT: 267.44 LBS | BODY MASS INDEX: 36.22 KG/M2

## 2022-06-23 DIAGNOSIS — M17.11 PRIMARY OSTEOARTHRITIS OF RIGHT KNEE: Primary | ICD-10-CM

## 2022-06-23 PROCEDURE — 20610 LARGE JOINT ASPIRATION/INJECTION: R KNEE: ICD-10-PCS | Mod: RT,S$GLB,, | Performed by: ORTHOPAEDIC SURGERY

## 2022-06-23 PROCEDURE — 99499 NO LOS: ICD-10-PCS | Mod: S$GLB,,, | Performed by: ORTHOPAEDIC SURGERY

## 2022-06-23 PROCEDURE — 99499 UNLISTED E&M SERVICE: CPT | Mod: S$GLB,,, | Performed by: ORTHOPAEDIC SURGERY

## 2022-06-23 PROCEDURE — 20610 DRAIN/INJ JOINT/BURSA W/O US: CPT | Mod: RT,S$GLB,, | Performed by: ORTHOPAEDIC SURGERY

## 2022-06-23 NOTE — PROCEDURES
Large Joint Aspiration/Injection: R knee    Date/Time: 6/23/2022 9:45 AM  Performed by: Karlos Mcelroy IV, MD  Authorized by: Karlos Mcelroy IV, MD     Consent Done?:  Yes (Verbal)  Indications:  Pain  Site marked: the procedure site was marked    Timeout: prior to procedure the correct patient, procedure, and site was verified    Prep: patient was prepped and draped in usual sterile fashion      Local anesthesia used?: Yes    Local anesthetic:  Lidocaine 1% without epinephrine    Details:  Needle Size:  22 G  Ultrasonic Guidance for needle placement?: No    Approach:  Anterolateral  Location:  Knee  Site:  R knee  Medications:  88 mg hyaluronate sodium, stabilized 88 mg/4 mL  Patient tolerance:  Patient tolerated the procedure well with no immediate complications         Symptoms of right knee pain similar to prior documented in notes. No new complaints.  Exam stable to prior exam.     The encounter diagnosis was Primary osteoarthritis of right knee.  1) Patient given gel injection.   2) follow up as needed

## 2022-07-26 ENCOUNTER — OFFICE VISIT (OUTPATIENT)
Dept: FAMILY MEDICINE | Facility: CLINIC | Age: 52
End: 2022-07-26
Payer: COMMERCIAL

## 2022-07-26 VITALS
DIASTOLIC BLOOD PRESSURE: 86 MMHG | HEIGHT: 72 IN | TEMPERATURE: 99 F | SYSTOLIC BLOOD PRESSURE: 136 MMHG | HEART RATE: 76 BPM | WEIGHT: 268.44 LBS | OXYGEN SATURATION: 98 % | BODY MASS INDEX: 36.36 KG/M2

## 2022-07-26 DIAGNOSIS — Z00.00 ROUTINE HEALTH MAINTENANCE: Primary | ICD-10-CM

## 2022-07-26 DIAGNOSIS — M17.11 PRIMARY OSTEOARTHRITIS OF RIGHT KNEE: ICD-10-CM

## 2022-07-26 PROCEDURE — 3079F DIAST BP 80-89 MM HG: CPT | Mod: CPTII,S$GLB,, | Performed by: FAMILY MEDICINE

## 2022-07-26 PROCEDURE — 3075F PR MOST RECENT SYSTOLIC BLOOD PRESS GE 130-139MM HG: ICD-10-PCS | Mod: CPTII,S$GLB,, | Performed by: FAMILY MEDICINE

## 2022-07-26 PROCEDURE — 99396 PREV VISIT EST AGE 40-64: CPT | Mod: S$GLB,,, | Performed by: FAMILY MEDICINE

## 2022-07-26 PROCEDURE — 3008F BODY MASS INDEX DOCD: CPT | Mod: CPTII,S$GLB,, | Performed by: FAMILY MEDICINE

## 2022-07-26 PROCEDURE — 99396 PR PREVENTIVE VISIT,EST,40-64: ICD-10-PCS | Mod: S$GLB,,, | Performed by: FAMILY MEDICINE

## 2022-07-26 PROCEDURE — 3075F SYST BP GE 130 - 139MM HG: CPT | Mod: CPTII,S$GLB,, | Performed by: FAMILY MEDICINE

## 2022-07-26 PROCEDURE — 1160F RVW MEDS BY RX/DR IN RCRD: CPT | Mod: CPTII,S$GLB,, | Performed by: FAMILY MEDICINE

## 2022-07-26 PROCEDURE — 3008F PR BODY MASS INDEX (BMI) DOCUMENTED: ICD-10-PCS | Mod: CPTII,S$GLB,, | Performed by: FAMILY MEDICINE

## 2022-07-26 PROCEDURE — 1159F MED LIST DOCD IN RCRD: CPT | Mod: CPTII,S$GLB,, | Performed by: FAMILY MEDICINE

## 2022-07-26 PROCEDURE — 1160F PR REVIEW ALL MEDS BY PRESCRIBER/CLIN PHARMACIST DOCUMENTED: ICD-10-PCS | Mod: CPTII,S$GLB,, | Performed by: FAMILY MEDICINE

## 2022-07-26 PROCEDURE — 1159F PR MEDICATION LIST DOCUMENTED IN MEDICAL RECORD: ICD-10-PCS | Mod: CPTII,S$GLB,, | Performed by: FAMILY MEDICINE

## 2022-07-26 PROCEDURE — 3079F PR MOST RECENT DIASTOLIC BLOOD PRESSURE 80-89 MM HG: ICD-10-PCS | Mod: CPTII,S$GLB,, | Performed by: FAMILY MEDICINE

## 2022-07-26 RX ORDER — SILDENAFIL 100 MG/1
100 TABLET, FILM COATED ORAL DAILY PRN
Qty: 30 TABLET | Refills: 3 | Status: SHIPPED | OUTPATIENT
Start: 2022-07-26 | End: 2023-08-09

## 2022-08-06 NOTE — PROGRESS NOTES
Patient ID: Frankie Hoyt is a 51 y.o. male.    Chief Complaint: Annual Exam and Knee Pain    HPI      Frankie Hoyt is a 51 y.o. male. here for annual exam.   No current complaints except pain in right knee.      Review of Symptoms  Answers for HPI/ROS submitted by the patient on 7/25/2022  activity change: No  unexpected weight change: No  neck pain: No  hearing loss: No  rhinorrhea: No  trouble swallowing: No  eye discharge: No  visual disturbance: No  chest tightness: No  wheezing: No  chest pain: No  palpitations: No  blood in stool: No  constipation: No  vomiting: No  diarrhea: No  polydipsia: No  polyuria: No  difficulty urinating: No  urgency: No  hematuria: No  joint swelling: No  arthralgias: No  headaches: No  weakness: No  confusion: No  dysphoric mood: No      Constitutional: Negative.    HENT: Negative.    Eyes: Negative.    Respiratory: Negative.    Cardiovascular: Negative.    Gastrointestinal: Negative.    Endocrine: Negative.    Genitourinary: Negative.    Musculoskeletal: Negative.    Skin: Negative.    Allergic/Immunologic: Negative.    Neurological: Negative.    Hematological: Negative.    Psychiatric/Behavioral: Negative.      Except as above in HPI      Vitals:    07/26/22 1607   BP: 136/86   BP Location: Left arm   Patient Position: Sitting   Pulse: 76   Temp: 98.5 °F (36.9 °C)   TempSrc: Oral   SpO2: 98%   Weight: 121.7 kg (268 lb 6.6 oz)   Height: 6' (1.829 m)        Physical  Exam      Constitutional:  Oriented to person, place, and time. Appears well-developed and well-nourished.     HENT:   Head: Normocephalic and atraumatic.     Right Ear: Tympanic membrane, ear canal and External ear normal     Left Ear: Tympanic membrane, ear canal and External ear normal     Nose: Nose normal. No rhinorrhea or nasal deformity.     Mouth/Throat: Uvula is midline, oropharynx is clear and moist and mucous membranes are normal.      Eyes: Conjunctivae are normal. Right eye exhibits no discharge. Left  eye exhibits no discharge. No scleral icterus.     Neck:  No JVD present. No tracheal deviation  []  Neck supple.   []  No Carotid bruit    Cardiovascular:  Regular rate and rhythm with normal S1 and S2     Pulmonary/Chest:   Clear to auscultation bilaterally without wheezes, rhonchi or rales    Musculoskeletal: Normal range of motion. No edema or tenderness.   No deformity     Lymphadenopathy:  No cervical adenopathy.     Neurological:  Alert and oriented to person, place, and time. Coordination normal.     Skin: Skin is warm and dry. No rash noted.     Psychiatric: Normal mood and affect. Speech is normal and behavior is normal. Judgment and thought content normal.     Complete Blood Count  Lab Results   Component Value Date    RBC 4.66 09/11/2020    HGB 14.0 09/11/2020    HCT 42.9 09/11/2020    MCV 92 09/11/2020    MCH 30.0 09/11/2020    MCHC 32.6 09/11/2020    RDW 12.3 09/11/2020     09/11/2020    MPV 8.8 (L) 09/11/2020    GRAN 2.9 09/11/2020    LYMPH 0.5 (L) 12/27/2018    LYMPH 16.7 (L) 12/27/2018    MONO 0.5 12/27/2018    MONO 15.1 (H) 12/27/2018    EOS 0.1 12/27/2018    BASO 0.04 12/27/2018    EOSINOPHIL 4.3 12/27/2018    BASOPHIL 1.3 12/27/2018    DIFFMETHOD Automated 12/27/2018       Comprehensive Metabolic Panel  No results found for: GLU, BUN, CREATININE, NA, K, CL, PROT, ALBUMIN, BILITOT, AST, ALKPHOS, CO2, ALT, ANIONGAP, EGFRNONAA, ESTGFRAFRICA    TSH  No results found for: TSH    Assessment / Plan:      ICD-10-CM ICD-9-CM   1. Routine health maintenance  Z00.00 V70.0   2. Primary osteoarthritis of right knee  M17.11 715.16     Routine health maintenance  -     Comprehensive Metabolic Panel; Future  -     CBC Auto Differential; Future  -     Lipid Panel; Future  -     TSH; Future  -     Hemoglobin A1C; Future; Expected date: 07/26/2022    Primary osteoarthritis of right knee  -     Ambulatory referral/consult to Orthopedics; Future; Expected date: 08/02/2022    Other orders  -     sildenafiL  (VIAGRA) 100 MG tablet; Take 1 tablet (100 mg total) by mouth daily as needed.  Dispense: 30 tablet; Refill: 3          Discussed how to stay healthy including: diet, and exercise.

## 2023-01-19 NOTE — PLAN OF CARE
Health Maintenance Due   Topic Date Due   • Hepatitis B Vaccine (For Physician/APC Discussion) (1 of 3 - 3-dose series) Never done   • COVID-19 Vaccine (1) Never done   • DTaP/Tdap/Td Vaccine (1 - Tdap) Never done   • Colorectal Cancer Screen-  Never done   • Shingles Vaccine (1 of 2) Never done   • Pneumococcal Vaccine 65+ (1 - PCV) Never done   • Influenza Vaccine (1) Never done   • Medicare Advantage- Medicare Wellness Visit  01/01/2023       Patient is due for topics as listed above but is not proceeding with Immunization(s) COVID-19, Dtap/Tdap/Td, Hep B, Influenza, Pneumococcal and Shingles, Colorectal Cancer Screening: Colonoscopy and MWV (Medicare Wellness Visit) at this time. Education provided for Immunization(s) COVID-19, Dtap/Tdap/Td, Hep B, Influenza, Pneumococcal and Shingles and Colorectal Cancer Screening: Colonoscopy.    Patient refuses vaccinations at this time.     Problem: Patient Care Overview  Goal: Plan of Care Review  Outcome: Ongoing (interventions implemented as appropriate)  Pt tolerated 2 liters Normal saline without issue, pt has no upcoming appts scheduled at this time, no distress noted upon d/c to home

## 2023-07-11 ENCOUNTER — HOSPITAL ENCOUNTER (EMERGENCY)
Facility: HOSPITAL | Age: 53
Discharge: HOME | End: 2023-07-11
Attending: EMERGENCY MEDICINE
Payer: COMMERCIAL

## 2023-07-11 VITALS
WEIGHT: 270 LBS | DIASTOLIC BLOOD PRESSURE: 74 MMHG | BODY MASS INDEX: 36.62 KG/M2 | OXYGEN SATURATION: 97 % | RESPIRATION RATE: 18 BRPM | HEART RATE: 71 BPM | TEMPERATURE: 98 F | SYSTOLIC BLOOD PRESSURE: 137 MMHG

## 2023-07-11 DIAGNOSIS — R07.9 CHEST PAIN: ICD-10-CM

## 2023-07-11 LAB
ALBUMIN SERPL BCP-MCNC: 4.3 G/DL (ref 3.5–5.2)
ALP SERPL-CCNC: 59 U/L (ref 55–135)
ALT SERPL W/O P-5'-P-CCNC: 30 U/L (ref 10–44)
ANION GAP SERPL CALC-SCNC: 6 MMOL/L (ref 8–16)
AST SERPL-CCNC: 28 U/L (ref 10–40)
BASOPHILS # BLD AUTO: 0.1 K/UL (ref 0–0.2)
BASOPHILS NFR BLD: 1.9 % (ref 0–1.9)
BILIRUB SERPL-MCNC: 0.8 MG/DL (ref 0.1–1)
BNP SERPL-MCNC: 14 PG/ML (ref 0–99)
BUN SERPL-MCNC: 25 MG/DL (ref 6–20)
CALCIUM SERPL-MCNC: 9.9 MG/DL (ref 8.7–10.5)
CHLORIDE SERPL-SCNC: 106 MMOL/L (ref 95–110)
CO2 SERPL-SCNC: 27 MMOL/L (ref 23–29)
CREAT SERPL-MCNC: 1.2 MG/DL (ref 0.5–1.4)
DIFFERENTIAL METHOD: ABNORMAL
EOSINOPHIL # BLD AUTO: 0 K/UL (ref 0–0.5)
EOSINOPHIL NFR BLD: 0.6 % (ref 0–8)
ERYTHROCYTE [DISTWIDTH] IN BLOOD BY AUTOMATED COUNT: 12.6 % (ref 11.5–14.5)
EST. GFR  (NO RACE VARIABLE): >60 ML/MIN/1.73 M^2
GLUCOSE SERPL-MCNC: 110 MG/DL (ref 70–110)
HCT VFR BLD AUTO: 44.2 % (ref 40–54)
HGB BLD-MCNC: 15.3 G/DL (ref 14–18)
IMM GRANULOCYTES # BLD AUTO: 0.08 K/UL (ref 0–0.04)
IMM GRANULOCYTES NFR BLD AUTO: 1.5 % (ref 0–0.5)
LIPASE SERPL-CCNC: 72 U/L (ref 4–60)
LYMPHOCYTES # BLD AUTO: 1.2 K/UL (ref 1–4.8)
LYMPHOCYTES NFR BLD: 23.3 % (ref 18–48)
MCH RBC QN AUTO: 30.4 PG (ref 27–31)
MCHC RBC AUTO-ENTMCNC: 34.6 G/DL (ref 32–36)
MCV RBC AUTO: 88 FL (ref 82–98)
MONOCYTES # BLD AUTO: 0.6 K/UL (ref 0.3–1)
MONOCYTES NFR BLD: 12.1 % (ref 4–15)
NEUTROPHILS # BLD AUTO: 3.2 K/UL (ref 1.8–7.7)
NEUTROPHILS NFR BLD: 60.6 % (ref 38–73)
NRBC BLD-RTO: 0 /100 WBC
PLATELET # BLD AUTO: 264 K/UL (ref 150–450)
PMV BLD AUTO: 8.9 FL (ref 9.2–12.9)
POTASSIUM SERPL-SCNC: 4.8 MMOL/L (ref 3.5–5.1)
PROT SERPL-MCNC: 7.4 G/DL (ref 6–8.4)
RBC # BLD AUTO: 5.04 M/UL (ref 4.6–6.2)
SODIUM SERPL-SCNC: 139 MMOL/L (ref 136–145)
TROPONIN I SERPL DL<=0.01 NG/ML-MCNC: <0.006 NG/ML (ref 0–0.03)
TROPONIN I SERPL DL<=0.01 NG/ML-MCNC: <0.006 NG/ML (ref 0–0.03)
WBC # BLD AUTO: 5.2 K/UL (ref 3.9–12.7)

## 2023-07-11 PROCEDURE — 99285 EMERGENCY DEPT VISIT HI MDM: CPT | Mod: 25

## 2023-07-11 PROCEDURE — 25000003 PHARM REV CODE 250: Performed by: PHYSICIAN ASSISTANT

## 2023-07-11 PROCEDURE — 80053 COMPREHEN METABOLIC PANEL: CPT | Performed by: PHYSICIAN ASSISTANT

## 2023-07-11 PROCEDURE — 85025 COMPLETE CBC W/AUTO DIFF WBC: CPT | Performed by: PHYSICIAN ASSISTANT

## 2023-07-11 PROCEDURE — 93010 EKG 12-LEAD: ICD-10-PCS | Mod: ,,, | Performed by: INTERNAL MEDICINE

## 2023-07-11 PROCEDURE — 93010 ELECTROCARDIOGRAM REPORT: CPT | Mod: ,,, | Performed by: INTERNAL MEDICINE

## 2023-07-11 PROCEDURE — 84484 ASSAY OF TROPONIN QUANT: CPT | Performed by: PHYSICIAN ASSISTANT

## 2023-07-11 PROCEDURE — 93005 ELECTROCARDIOGRAM TRACING: CPT

## 2023-07-11 PROCEDURE — 83690 ASSAY OF LIPASE: CPT | Performed by: PHYSICIAN ASSISTANT

## 2023-07-11 PROCEDURE — 83880 ASSAY OF NATRIURETIC PEPTIDE: CPT | Performed by: PHYSICIAN ASSISTANT

## 2023-07-11 RX ORDER — ASPIRIN 325 MG
325 TABLET ORAL
Status: COMPLETED | OUTPATIENT
Start: 2023-07-11 | End: 2023-07-11

## 2023-07-11 RX ADMIN — NITROGLYCERIN 0.5 INCH: 20 OINTMENT TOPICAL at 01:07

## 2023-07-11 RX ADMIN — ASPIRIN 325 MG ORAL TABLET 325 MG: 325 PILL ORAL at 01:07

## 2023-07-11 NOTE — ED PROVIDER NOTES
Encounter Date: 7/11/2023       History     Chief Complaint   Patient presents with    Chest Pain     Intermittent CP x 15 hours located to left and lower right of sternum, pt states he went to gym this am and states + flutter while on elliptical , pt also has hx of acid reflux and stated trigger is red gravy, which he ate last night      52-year-old male with history of throat cancer, no longer in treatment, is presenting to the emergency department for dull midsternal chest pain.  States he 1st experienced these symptoms 2 weeks ago he also felt associated irregular heartbeat.  States this occurred initially after a weekend of heavy drinking.  Today he had another episode of this dull midsternal chest pain and also epigastric pain.  No associated nausea, vomiting.  Abdomen is otherwise not painful.  He denies any shortness of breath.  There are no aggravating factors.  He admits to taking Motrin every day for arthritis.  States that he stopped using tobacco products in 2018.  Notes significant family history of cardiac disease in his father.  Patient states he has been seen by Cardiology in the past for feelings of irregular heartbeat but is evaluation was reassuring, this was about 6 years ago.  He denies any fevers, cough, congestion, respiratory illness.  No calf pain, lower extremity edema.  He states that he is a  and regularly exercises.    The history is provided by the patient. No  was used.   Review of patient's allergies indicates:  No Known Allergies  Past Medical History:   Diagnosis Date    Anxiety     Cancer of tonsil     left tonsil    Diverticular disease 2012     Past Surgical History:   Procedure Laterality Date    COLONOSCOPY  2014    colonscopy  2012    LARYNGOSCOPY N/A 10/03/2018    Procedure: LARYNGOSCOPY;  Surgeon: Serge Mccray MD;  Location: [facility] OR [address] [place] [address];  Service: ENT;  Laterality: N/A;    tonsil biopsy 2018      TONSILLECTOMY Right 10/03/2018     Procedure: TONSILLECTOMY;  Surgeon: Serge Mccray MD;  Location: [facility] OR [address] [place] [address];  Service: ENT;  Laterality: Right;    TONSILLECTOMY       Family History   Problem Relation Age of Onset    Cancer Father         colon and prostate cancer    Heart disease Father     Diabetes Father      Social History     Tobacco Use    Smoking status: Former     Types: Cigars     Quit date: 2014     Years since quittin.5    Smokeless tobacco: Former    Tobacco comments:     1 per weekend   Substance Use Topics    Alcohol use: Yes     Alcohol/week: 12.0 standard drinks     Types: 12 Cans of beer per week     Comment: weekend    Drug use: No     Review of Systems   Constitutional:         See HPI     Physical Exam     Initial Vitals [23 1308]   BP Pulse Resp Temp SpO2   (!) 189/107 96 18 97.6 °F (36.4 °C) 97 %      MAP       --         Physical Exam    Nursing note and vitals reviewed.  Constitutional: He appears well-developed and well-nourished. He is not diaphoretic. No distress.   HENT:   Head: Normocephalic and atraumatic.   Eyes: Conjunctivae and EOM are normal.   Neck: Neck supple.   Normal range of motion.  Cardiovascular:  Normal rate, regular rhythm, normal heart sounds and intact distal pulses.           Pulmonary/Chest: Breath sounds normal. No respiratory distress. He has no wheezes. He has no rhonchi. He has no rales.   Abdominal: Abdomen is soft. Bowel sounds are normal. He exhibits no distension. There is no abdominal tenderness. There is no rebound and no guarding.   Musculoskeletal:         General: No tenderness or edema. Normal range of motion.      Cervical back: Normal range of motion and neck supple.     Neurological: He is alert. He has normal strength.   Skin: Skin is warm and dry.   Psychiatric: He has a normal mood and affect. Thought content normal.       ED Course   Procedures  Labs Reviewed   CBC W/ AUTO DIFFERENTIAL - Abnormal; Notable for the following components:       Result Value     MPV 8.9 (*)     Immature Granulocytes 1.5 (*)     Immature Grans (Abs) 0.08 (*)     All other components within normal limits   COMPREHENSIVE METABOLIC PANEL - Abnormal; Notable for the following components:    BUN 25 (*)     Anion Gap 6 (*)     All other components within normal limits   LIPASE - Abnormal; Notable for the following components:    Lipase 72 (*)     All other components within normal limits   TROPONIN I   B-TYPE NATRIURETIC PEPTIDE   LIPASE   TROPONIN I     EKG Readings: (Independently Interpreted)   Initial Reading: No STEMI. Previous EKG: Compared with most recent EKG Rhythm: Normal Sinus Rhythm. Heart Rate: 91. ST Segments: Normal ST Segments. T Waves: Normal.   Compared to prior EKG T wave inversions are now upright    ECG Results              EKG 12-lead (In process)  Result time 07/12/23 07:14:29      In process by Interface, Lab In [facility] (07/12/23 07:14:29)                   Narrative:    Test Reason : R07.9,    Vent. Rate : 091 BPM     Atrial Rate : 091 BPM     P-R Int : 172 ms          QRS Dur : 096 ms      QT Int : 370 ms       P-R-T Axes : 034 067 046 degrees     QTc Int : 455 ms    Normal sinus rhythm  Possible Left atrial enlargement  Borderline Abnormal ECG  When compared with ECG of 25-SEP-2017 10:21,  Premature ventricular complexes are no longer Present  Nonspecific T wave abnormality, improved in Inferior leads  T wave inversion no longer evident in Anterior-lateral leads    Referred By: [email or web address]   SELF           Confirmed By:                                   Imaging Results              X-Ray Chest AP Portable (Final result)  Result time 07/11/23 14:42:20      Final result by Kwame Vicente DO (07/11/23 14:42:20)                   Impression:      Please see above      Electronically signed by: Kwame Vicente DO  Date:    07/11/2023  Time:    14:42               Narrative:    EXAMINATION:  XR CHEST AP PORTABLE    CLINICAL HISTORY:  Chest Pain;    TECHNIQUE:  Single frontal  view of the chest was performed.    COMPARISON:  12/15/2012    FINDINGS:  Continue nonspecific elevation right lung base.  No new lung opacity.  No lung consolidation.  No large pleural effusion or pneumothorax.  Further evaluation as warranted clinically.                                       Medications   aspirin tablet 325 mg (325 mg Oral Given 7/11/23 1331)   nitroGLYCERIN 2% TD oint ointment 0.5 inch (0.5 inches Topical (Top) Given 7/11/23 1331)     Medical Decision Making:   History:   Old Medical Records: I decided to obtain old medical records.  Old Records Summarized: records from previous admission(s).  Initial Assessment:   52-year-old male with history of throat cancer, no longer in treatment, is presenting to the emergency department for dull midsternal chest pain.   Differential Diagnosis:   MI/ACS, PE, Aortic dissection, PTX, PNA, GERD, esophagitis, malignant cardiac dysrhythmia, pleurisy, costochondritis  Clinical Tests:   Lab Tests: Ordered and Reviewed  Radiological Study: Ordered and Reviewed  Medical Tests: Ordered and Reviewed  ED Management:  Presentation with chest pain as described above.     Differential diagnosis includes but is not limited to: ACS, pneumonia, PE, pneumothorax, pulmonary edema/CHF, aortic dissection, pericarditis, intra-abdominal process.     - EKG and history do not support the diagnosis of pericarditis.   - There is no evidence of pneumothorax or infiltrate on CXR.   - Aortic dissection considered, but presenting symptoms are uncharacteristic. The chest X-ray shows no evidence of mediastinal widening and the patient has strong, equal and symmetric pulses. Given the current presentation, aortic dissection is considered unlikely.  - History, CXR, and exam do not suggest pulmonary edema/congestive heart failure.   - Pulmonary embolism was considered but unlikely due low pre-test probability by [facility] followed by a negative PERC rule (age<50, HR <100, O2 sat > 94%, no recent  trauma/surgery, no hemoptysis, no exogenous hormone use, no clinical signs suggestive of DVT, no hx DVT/PE). Therefore, there is <2% chance of PE. Futhermore, the patient is not tachycardic nor hypoxic. Given unlikeliness of PE, but potential risk of further diagnostic testing I will defer additional work-up for PE. I have discussed strict return precautions with the patient.   - Acute coronary syndrome considered but there are negative serial biomarkers, no acute ischemic EKG changes, and the patient has a low HEART score. Based on multiple studies, a patient with a HEART score of 3 or less has a very low risk (<2%) of short term adverse events including MI/death and are appropriate for discharge with close outpatient follow-up.  Additional MDM:   Heart Score:    History:          Slightly suspicious.  ECG:             Normal  Age:               45-65 years  Risk factors: 1-2 risk factors  Troponin:       Less than or equal to normal limit  Final Score: 2         Attending Attestation:   Physician Attestation Statement for Resident:  As the supervising MD   Physician Attestation Statement: I have personally seen and examined this patient.   I agree with the above history.  -:   As the supervising MD I agree with the above PE.     As the supervising MD I agree with the above treatment, course, plan, and disposition.                  ED Course as of 07/12/23 1355   Tue Jul 11, 2023   1418 Troponin I: <0.006 [KL]   1525 Lipase(!): 72 [KL]   1718 Troponin I: <0.006  Repeat WNL [KL]      ED Course User Index  [KL] Nadira Jon MD                 Clinical Impression:   Final diagnoses:  [R07.9] Chest pain        ED Disposition Condition    Discharge Stable          ED Prescriptions    None       Follow-up Information       Follow up With Specialties Details Why Contact Info    [facility] - Emergency Dept Emergency Medicine Go to  As needed, If symptoms worsen [address] [facility]  [id number]  [phone]    Raymond Levi MD Family Medicine Schedule an appointment as soon as possible for a visit in 2 days  [address] [place] [id number]  [phone]               Nadira Jon MD  Resident  07/11/23 2019       Filipe Gongora MD  07/12/23 [id number]

## 2023-07-11 NOTE — FIRST PROVIDER EVALUATION
Emergency Department TeleTriage Encounter Note      CHIEF COMPLAINT    Chief Complaint   Patient presents with    Chest Pain     Intermittent CP x 15 hours located to left and lower right of sternum, pt states he went to gym this am and states + flutter while on elliptical , pt also has hx of acid reflux and stated trigger is red gravy, which he ate last night        VITAL SIGNS   Initial Vitals [07/11/23 1308]   BP Pulse Resp Temp SpO2   (!) 189/107 96 18 97.6 °F (36.4 °C) 97 %      MAP       --            ALLERGIES    Review of patient's allergies indicates:  No Known Allergies    PROVIDER TRIAGE NOTE  Patient presents with intermittent left sided chest pain since last night. No shortness of breath. He went to the gym this morning and while on the elliptical he had some palpitations, but chest pain did not increase.       ORDERS  Labs Reviewed - No data to display    ED Orders (720h ago, onward)      Start Ordered     Status Ordering Provider    07/11/23 1311 07/11/23 1310  EKG 12-lead  Once         Completed by UDAY MULLEN on 7/11/2023 at  1:11 PM DELILAH ARAIZA              Virtual Visit Note: The provider triage portion of this emergency department evaluation and documentation was performed via [employer], a HIPAA-compliant telemedicine application, in concert with a tele-presenter in the room. A face to face patient evaluation with one of my colleagues will occur once the patient is placed in an emergency department room.      DISCLAIMER: This note was prepared with [employer] voice recognition transcription software. Garbled syntax, mangled pronouns, and other bizarre constructions may be attributed to that software system.

## 2023-07-11 NOTE — DISCHARGE INSTRUCTIONS
Diagnosis: chest pain     Tests today showed:   Labs Reviewed   CBC W/ AUTO DIFFERENTIAL - Abnormal; Notable for the following components:       Result Value    MPV 8.9 (*)     Immature Granulocytes 1.5 (*)     Immature Grans (Abs) 0.08 (*)     All other components within normal limits   COMPREHENSIVE METABOLIC PANEL - Abnormal; Notable for the following components:    BUN 25 (*)     Anion Gap 6 (*)     All other components within normal limits   LIPASE - Abnormal; Notable for the following components:    Lipase 72 (*)     All other components within normal limits   TROPONIN I   B-TYPE NATRIURETIC PEPTIDE   LIPASE   TROPONIN I     X-Ray Chest AP Portable   Final Result      Please see above         Electronically signed by: Kwame Vicente DO   Date:    07/11/2023   Time:    14:42          Treatments you had today:   Medications   aspirin tablet 325 mg (325 mg Oral Given 7/11/23 1331)   nitroGLYCERIN 2% TD oint ointment 0.5 inch (0.5 inches Topical (Top) Given 7/11/23 1331)       Follow-Up Plan:  - Follow-up with primary care doctor within 3 - 5 days  - Additional testing and/or evaluation as directed by your primary doctor    Return to the Emergency Department for symptoms including but not limited to: worsening symptoms, shortness of breath or chest pain, vomiting with inability to hold down fluids, fevers greater than 100.4°F, passing out/fainting/unconsciousness, or other concerning symptoms.

## 2023-07-11 NOTE — Clinical Note
"Frankie Brito"Lai was seen and treated in our emergency department on 7/11/2023.  He may return to work on 07/12/2023.       If you have any questions or concerns, please don't hesitate to call.      Nadira Jon MD"

## 2023-08-08 NOTE — TELEPHONE ENCOUNTER
No care due was identified.  [facility] Embedded Care Due Messages. Reference number: [id number].   8/08/2023 11:56:33 AM CDT

## 2023-08-09 RX ORDER — SILDENAFIL 100 MG/1
100 TABLET, FILM COATED ORAL DAILY PRN
Qty: 30 TABLET | Refills: 3 | Status: SHIPPED | OUTPATIENT
Start: 2023-08-09 | End: 2023-10-12 | Stop reason: SDUPTHER

## 2023-09-28 ENCOUNTER — PATIENT MESSAGE (OUTPATIENT)
Dept: ADMINISTRATIVE | Facility: HOSPITAL | Age: 53
End: 2023-09-28
Payer: COMMERCIAL

## 2023-09-28 ENCOUNTER — PATIENT OUTREACH (OUTPATIENT)
Dept: ADMINISTRATIVE | Facility: HOSPITAL | Age: 53
End: 2023-09-28
Payer: COMMERCIAL

## 2023-09-28 NOTE — PROGRESS NOTES
Care Everywhere updates requested and reviewed.  Immunizations reconciled. Media reports reviewed.  Duplicate HM overrides and  orders removed.  Overdue HM topic chart audit and/or requested.  Overdue lab testing linked to upcoming lab appointments if applies.    [facility] reviewed for Lab testing       Health Maintenance Due   Topic Date Due    COVID-19 Vaccine (1) Never done    HIV Screening  Never done    Hemoglobin A1c (Diabetic Prevention Screening)  Never done    Colorectal Cancer Screening  2018    Shingles Vaccine (1 of 2) Never done    Lipid Panel  2022    Influenza Vaccine (1) 2023

## 2023-10-12 ENCOUNTER — TELEPHONE (OUTPATIENT)
Dept: GASTROENTEROLOGY | Facility: CLINIC | Age: 53
End: 2023-10-12
Payer: COMMERCIAL

## 2023-10-12 ENCOUNTER — OFFICE VISIT (OUTPATIENT)
Dept: FAMILY MEDICINE | Facility: CLINIC | Age: 53
End: 2023-10-12
Payer: COMMERCIAL

## 2023-10-12 VITALS
SYSTOLIC BLOOD PRESSURE: 132 MMHG | WEIGHT: 265.88 LBS | OXYGEN SATURATION: 97 % | DIASTOLIC BLOOD PRESSURE: 88 MMHG | BODY MASS INDEX: 36.01 KG/M2 | HEIGHT: 72 IN | HEART RATE: 82 BPM | TEMPERATURE: 99 F

## 2023-10-12 DIAGNOSIS — Z00.00 ROUTINE HEALTH MAINTENANCE: Primary | ICD-10-CM

## 2023-10-12 DIAGNOSIS — R68.82 LOW LIBIDO: ICD-10-CM

## 2023-10-12 DIAGNOSIS — M19.90 ARTHRITIS: ICD-10-CM

## 2023-10-12 DIAGNOSIS — Z12.11 SCREENING FOR COLON CANCER: ICD-10-CM

## 2023-10-12 DIAGNOSIS — N52.9 ERECTILE DYSFUNCTION, UNSPECIFIED ERECTILE DYSFUNCTION TYPE: ICD-10-CM

## 2023-10-12 DIAGNOSIS — R53.83 FATIGUE, UNSPECIFIED TYPE: ICD-10-CM

## 2023-10-12 PROCEDURE — 99396 PR PREVENTIVE VISIT,EST,40-64: ICD-10-PCS | Mod: S$GLB,,, | Performed by: FAMILY MEDICINE

## 2023-10-12 PROCEDURE — 1159F PR MEDICATION LIST DOCUMENTED IN MEDICAL RECORD: ICD-10-PCS | Mod: CPTII,S$GLB,, | Performed by: FAMILY MEDICINE

## 2023-10-12 PROCEDURE — 3008F PR BODY MASS INDEX (BMI) DOCUMENTED: ICD-10-PCS | Mod: CPTII,S$GLB,, | Performed by: FAMILY MEDICINE

## 2023-10-12 PROCEDURE — 3008F BODY MASS INDEX DOCD: CPT | Mod: CPTII,S$GLB,, | Performed by: FAMILY MEDICINE

## 2023-10-12 PROCEDURE — 3075F PR MOST RECENT SYSTOLIC BLOOD PRESS GE 130-139MM HG: ICD-10-PCS | Mod: CPTII,S$GLB,, | Performed by: FAMILY MEDICINE

## 2023-10-12 PROCEDURE — 3075F SYST BP GE 130 - 139MM HG: CPT | Mod: CPTII,S$GLB,, | Performed by: FAMILY MEDICINE

## 2023-10-12 PROCEDURE — 1160F PR REVIEW ALL MEDS BY PRESCRIBER/CLIN PHARMACIST DOCUMENTED: ICD-10-PCS | Mod: CPTII,S$GLB,, | Performed by: FAMILY MEDICINE

## 2023-10-12 PROCEDURE — 3079F DIAST BP 80-89 MM HG: CPT | Mod: CPTII,S$GLB,, | Performed by: FAMILY MEDICINE

## 2023-10-12 PROCEDURE — 3079F PR MOST RECENT DIASTOLIC BLOOD PRESSURE 80-89 MM HG: ICD-10-PCS | Mod: CPTII,S$GLB,, | Performed by: FAMILY MEDICINE

## 2023-10-12 PROCEDURE — 1160F RVW MEDS BY RX/DR IN RCRD: CPT | Mod: CPTII,S$GLB,, | Performed by: FAMILY MEDICINE

## 2023-10-12 PROCEDURE — 1159F MED LIST DOCD IN RCRD: CPT | Mod: CPTII,S$GLB,, | Performed by: FAMILY MEDICINE

## 2023-10-12 PROCEDURE — 99396 PREV VISIT EST AGE 40-64: CPT | Mod: S$GLB,,, | Performed by: FAMILY MEDICINE

## 2023-10-12 RX ORDER — SILDENAFIL 100 MG/1
100 TABLET, FILM COATED ORAL DAILY PRN
Qty: 30 TABLET | Refills: 3 | Status: SHIPPED | OUTPATIENT
Start: 2023-10-12

## 2023-10-22 NOTE — PROGRESS NOTES
Patient ID: Frankie Hoyt is a 53 y.o. male.    Chief Complaint: Follow-up    HPI      Frankie Hoyt is a 53 y.o. male. here for annual exam.  Complains of low libido dysfunction.  No current complaints.        Review of Symptoms    Constitutional: Negative.    HENT: Negative.    Eyes: Negative.    Respiratory: Negative.    Cardiovascular: Negative.    Gastrointestinal: Negative.    Endocrine: Negative.    Genitourinary: Negative.    Musculoskeletal: Negative.    Skin: Negative.    Allergic/Immunologic: Negative.    Neurological: Negative.    Hematological: Negative.    Psychiatric/Behavioral: Negative.      Except as above in HPI      Vitals:    10/12/23 0848   BP: 132/88   BP Location: Right arm   Patient Position: Sitting   Pulse: 82   Temp: 98.8 °F (37.1 °C)   TempSrc: Oral   SpO2: 97%   Weight: 120.6 kg (265 lb 14 oz)   Height: 6' (1.829 m)        Physical  Exam      Constitutional:  Oriented to person, place, and time. Appears well-developed and well-nourished.     HENT:   Head: Normocephalic and atraumatic.     Right Ear: Tympanic membrane, ear canal and External ear normal     Left Ear: Tympanic membrane, ear canal and External ear normal     Nose: Nose normal. No rhinorrhea or nasal deformity.     Mouth/Throat: Uvula is midline, oropharynx is clear and moist and mucous membranes are normal.      Eyes: Conjunctivae are normal. Right eye exhibits no discharge. Left eye exhibits no discharge. No scleral icterus.     Neck:  No JVD present. No tracheal deviation  []  Neck supple.   []  No Carotid bruit    Cardiovascular:  Regular rate and rhythm with normal S1 and S2     Pulmonary/Chest:   Clear to auscultation bilaterally without wheezes, rhonchi or rales    Musculoskeletal: Normal range of motion. No edema or tenderness.   No deformity     Lymphadenopathy:  No cervical adenopathy.     Neurological:  Alert and oriented to person, place, and time. Coordination normal.     Skin: Skin is warm and dry. No rash  "noted.     Psychiatric: Normal mood and affect. Speech is normal and behavior is normal. Judgment and thought content normal.     Complete Blood Count  Lab Results   Component Value Date    RBC 5.04 07/11/2023    HGB 15.3 07/11/2023    HCT 44.2 07/11/2023    MCV 88 07/11/2023    MCH 30.4 07/11/2023    MCHC 34.6 07/11/2023    RDW 12.6 07/11/2023     07/11/2023    MPV 8.9 (L) 07/11/2023    GRAN 3.2 07/11/2023    GRAN 60.6 07/11/2023    LYMPH 1.2 07/11/2023    LYMPH 23.3 07/11/2023    MONO 0.6 07/11/2023    MONO 12.1 07/11/2023    EOS 0.0 07/11/2023    BASO 0.10 07/11/2023    EOSINOPHIL 0.6 07/11/2023    BASOPHIL 1.9 07/11/2023    DIFFMETHOD Automated 07/11/2023       Comprehensive Metabolic Panel  Lab Results   Component Value Date     07/11/2023    BUN 25 (H) 07/11/2023    CREATININE 1.2 07/11/2023     07/11/2023    K 4.8 07/11/2023     07/11/2023    PROT 7.4 07/11/2023    ALBUMIN 4.3 07/11/2023    BILITOT 0.8 07/11/2023    AST 28 07/11/2023    ALKPHOS 59 07/11/2023    CO2 27 07/11/2023    ALT 30 07/11/2023    ANIONGAP 6 (L) 07/11/2023       TSH  No results found for: "TSH"    Assessment / Plan:      ICD-10-CM ICD-9-CM   1. Routine health maintenance  Z00.00 V70.0   2. Screening for colon cancer  Z12.11 V76.51   3. Arthritis  M19.90 716.90   4. Fatigue, unspecified type  R53.83 780.79   5. Low libido  R68.82 799.81   6. Erectile dysfunction, unspecified erectile dysfunction type  N52.9 607.84     Routine health maintenance    Screening for colon cancer  -     sildenafiL (VIAGRA) 100 MG tablet; Take 1 tablet (100 mg total) by mouth daily as needed.  Dispense: 30 tablet; Refill: 3  -     Case Request Endoscopy: COLONOSCOPY  -     Case Request Endoscopy: COLONOSCOPY    Arthritis  -     Testosterone; Future    Fatigue, unspecified type  -     Testosterone; Future    Low libido  -     Testosterone; Future    Erectile dysfunction, unspecified erectile dysfunction type  -     Testosterone; " Future          Discussed how to stay healthy including: diet, and exercise.

## 2023-11-02 ENCOUNTER — PATIENT MESSAGE (OUTPATIENT)
Dept: FAMILY MEDICINE | Facility: CLINIC | Age: 53
End: 2023-11-02
Payer: COMMERCIAL

## 2023-12-14 ENCOUNTER — PATIENT MESSAGE (OUTPATIENT)
Dept: ADMINISTRATIVE | Facility: HOSPITAL | Age: 53
End: 2023-12-14
Payer: COMMERCIAL

## 2023-12-21 ENCOUNTER — PATIENT OUTREACH (OUTPATIENT)
Dept: ADMINISTRATIVE | Facility: HOSPITAL | Age: 53
End: 2023-12-21
Payer: COMMERCIAL

## 2023-12-21 ENCOUNTER — PATIENT MESSAGE (OUTPATIENT)
Dept: ADMINISTRATIVE | Facility: HOSPITAL | Age: 53
End: 2023-12-21
Payer: COMMERCIAL

## 2024-01-24 ENCOUNTER — PATIENT MESSAGE (OUTPATIENT)
Dept: FAMILY MEDICINE | Facility: CLINIC | Age: 54
End: 2024-01-24
Payer: COMMERCIAL

## 2024-02-20 ENCOUNTER — PATIENT MESSAGE (OUTPATIENT)
Dept: ADMINISTRATIVE | Facility: HOSPITAL | Age: 54
End: 2024-02-20
Payer: COMMERCIAL

## 2024-02-22 ENCOUNTER — PATIENT MESSAGE (OUTPATIENT)
Dept: ADMINISTRATIVE | Facility: HOSPITAL | Age: 54
End: 2024-02-22
Payer: COMMERCIAL

## 2024-02-22 ENCOUNTER — PATIENT OUTREACH (OUTPATIENT)
Dept: ADMINISTRATIVE | Facility: HOSPITAL | Age: 54
End: 2024-02-22
Payer: COMMERCIAL

## 2024-02-22 DIAGNOSIS — Z12.11 COLON CANCER SCREENING: Primary | ICD-10-CM

## 2024-02-22 NOTE — PROGRESS NOTES
Population Health Chart Review & Patient Outreach Details    Outreach Performed: YES Portal    Additional Pop Health Notes:           Updates Requested / Reviewed:           Health Maintenance Topics Overdue:    Health Maintenance Due   Topic Date Due    COVID-19 Vaccine (1) Never done    HIV Screening  Never done    Hemoglobin A1c (Diabetic Prevention Screening)  Never done    Colorectal Cancer Screening  06/17/2018    Shingles Vaccine (1 of 2) Never done    Lipid Panel  09/29/2022    Influenza Vaccine (1) 09/01/2023         Health Maintenance Topic(s) Outreach Outcomes & Actions Taken:    Colorectal Cancer Screening - Outreach Outcomes & Actions Taken  : portal sent

## 2024-03-01 ENCOUNTER — TELEPHONE (OUTPATIENT)
Dept: GASTROENTEROLOGY | Facility: CLINIC | Age: 54
End: 2024-03-01
Payer: COMMERCIAL

## 2024-03-01 NOTE — LETTER
March 1, 2024    Frankie Hoyt  [address] [place] [id number]             [facility]  [address]  [address]  [facility] [address]  Phone: [phone]  Fax: [phone] Dear Mr. Hoyt:    We have attempted to contact you to schedule a colonoscopy that was ordered by your doctor. Please contact the office to schedule at [phone].      If you have any questions or concerns, please don't hesitate to call.    Sincerely,        Niru Mehta MA

## 2024-05-13 ENCOUNTER — PATIENT MESSAGE (OUTPATIENT)
Dept: GASTROENTEROLOGY | Facility: CLINIC | Age: 54
End: 2024-05-13

## 2024-05-13 DIAGNOSIS — Z12.11 SCREEN FOR COLON CANCER: Primary | ICD-10-CM

## 2024-05-13 RX ORDER — POLYETHYLENE GLYCOL 3350, SODIUM SULFATE ANHYDROUS, SODIUM BICARBONATE, SODIUM CHLORIDE, POTASSIUM CHLORIDE 236; 22.74; 6.74; 5.86; 2.97 G/4L; G/4L; G/4L; G/4L; G/4L
4 POWDER, FOR SOLUTION ORAL ONCE
Qty: 1 EACH | Refills: 0 | Status: SHIPPED | OUTPATIENT
Start: 2024-05-13 | End: 2024-05-13

## 2024-05-13 NOTE — TELEPHONE ENCOUNTER
Spoke to pt to schedule procedure(s) Colonoscopy       Physician to perform procedure(s) Dr. ROBYN Saul  Date of Procedure (s) 6/21/2024  Arrival Time 1:30 PM  Time of Procedure(s) 2:30 PM   Location of Procedure(s) [place] 1st Floor   Type of Rx Prep sent to patient: PEG  Instructions provided to patient via [facility]    Patient was informed on the following information and verbalized understanding. Screening questionnaire reviewed with patient and complete. If procedure requires anesthesia, a responsible adult needs to be present to accompany the patient home, patient cannot drive after receiving anesthesia. Appointment details are tentative, especially check-in time. Patient will receive a prep-op call 7 days prior to confirm check-in time for procedure. If applicable the patient should contact their pharmacy to verify Rx for procedure prep is ready for pick-up. Patient was advised to call the scheduling department at [phone] if pharmacy states no Rx is available. Patient was advised to call the endoscopy scheduling department if any questions or concerns arise.       Endoscopy Scheduling Department           Colonoscopy Procedure Prep Instructions    Date of procedure: 6/21/2024 Arrive at: 10:30 AM    Location of Department:   [facility] [address] [place] [address]   1st Floor Same Day Surgery    As soon as possible:   your prep from pharmacy and over the counter DULCOLAX LAXATIVE TABLETS            On the day before your procedure   What You CAN do:   You may have clear liquids ONLY-see below for list.     Liquids That Are OK to Drink:   Water  Sports drinks (Gatorade, Power-Aid)  Coffee or tea (no cream or nondairy creamer)  Clear juices without pulp (apple, white grape)  Gelatin desserts (no fruit or toppings)  Clear soda (sprite, coke, ginger ale)  Chicken broth (until 12 midnight the night before procedure)    What You CANNOT do:   Do not EAT solid food, drink  milk or anything   colored red.  Do not drink alcohol.  Do not take oral medications within 1 hour of starting   each dose of prep.  No gum chewing or candy morning of procedure                       Note:   (Please disregard the insert instructions from pharmacy).  PEG Bowel Prep is indicated for cleansing of the colon as a preparation for colonoscopy in adults.   Be sure to tell your doctor about all the medicines you take, including prescription and non-prescription medicines, vitamins, and herbal supplements. PEG Bowel Prep may affect how other medicines work.  Medication taken by mouth may not be absorbed properly when taken within 1 hour before the start of each dose of PEG Bowel Prep.    It is not uncommon to experience some abdominal cramping, nausea and/or vomiting when taking the prep. If you have nausea and/or vomiting while taking the prep, stop drinking for 20 to 30 minutes then continue.      How to take prep:    PEG Bowel Prep is a (2-day) prep.    One (1) bottle of prep are required for a complete preparation for colonoscopy. Dilute the solution concentrate as directed prior to use. You must drink water with each dose of prep, and additional water after each dose.    DOSE 1--Day Before Colonoscopy 6/20/2024     Drink at least 6 to 8 glasses of clear liquids from time you wake up until you begin your prep and then continue until bedtime to avoid dehydration.     12:00 pm (NOON) Mix your entire container of prep with lukewarm water and refrigerate. Take four (4) Dulcolax (Bisacodyl) tablets with at least 8 ounces or more of clear liquids.       6:00 pm:    You must complete Steps 1 and 2 below before going to bed:    Step 1-Drink half the liquid in the container within one (1) hour.   Step 2-Refrigerate the remaining half of the liquid for dose 2. See below when to begin this step.                       IMPORTANT: If you experience preparation-related symptoms (for example, nausea, bloating, or  cramping), stop, or slow the rate of drinking the additional water until your symptoms decrease.    DOSE 2--Day of the Colonoscopy 6/21/2024 at 2-3 AM.    For this dose, repeat Step 1 shown above using the remaining half of the liquid prep.   You may continue drinking water/clear liquids until   4 hours before your colonoscopy or as directed by the scheduling nurse  10:30 AM.      For information about your procedure, two (2) things to view prior to colonoscopy:  Please watch this informational video. It is important to watch this animated consent video prior to your arrival. If you haven't watched the video prior to arriving, you are required to watch it during admission which can causes delays.    Options for viewing:   Using a keyboard:  press and hold the control tab (Ctrl) and left mouse click to follow links.           Colonoscopy Instructional Video                                                                                   OR    Type link address into your web browser's address bar:  [email or web address]      Educational Booklet with pictures:      Colonoscopy Prep - Liquid      Comments:          IMPORTANT INFORMATION TO KNOW BEFORE YOUR PROCEDURE    [facility] 1st Floor     If your procedure requires the administration of anesthesia, it is necessary for a responsible adult to drive you home. (Medical Transportation, Uber, Lyft, Taxi, etc. may ONLY be used if a responsible adult is present to accompany you home.  The responsible adult CAN'T be the  of the service).      person must be available to return to pick you up within 15 minutes of being notified of discharge.       Please bring a picture ID, insurance card, & copayment      Take Medications as directed below:      If you begin taking any blood thinning medications or injectable weight loss/diabetes medications (other than insulin) , please contact the endoscopy scheduling  department listed below as soon as possible.    If you are diabetic see the attached instruction sheet regarding your medication.     If you take HEART, BLOOD PRESSURE, SEIZURE, PAIN, LUNG (including inhalers/nebulizers), ANTI-REJECTION (transplant patients), or PSYCHIATRIC medications, please take at your regular times with a sip of water or as directed by the scheduling nurse.     Important contact information:    Endoscopy [phone] Hours of operation Monday-Friday 8:00-4:30pm.    Questions about insurance or financial obligations call [phone] or [phone].    If you have questions regarding the prep or need to reschedule, please call [phone]. After hours questions requiring immediate assistance, contact [facility] nurse line at [phone] or [phone].   NOTE:     On occasion, unforeseen circumstances may cause a delay in your procedure start time. We respect your time and appreciate your patience during these circumstances.      Comments:        Are you ready for your Colonoscopy?      __ If you take blood thinners,weight loss or diabetic injectable medications, have you stopped taking them according to your doctor's instructions before your procedures?  __ Have you stopped eating solid foods and followed a clear liquid diet a full day before your procedure or followed the diet       guidelines indicated in your instructions?       REMINDER: NO BROTH AFTER MIDNIGHT THE DAY BEFORE PROCEDURE.   __ Have you completed all your prep solution? PLEASE DO NOT FOLLOW the insert/or box instructions from pharmacy)  __ Have you taken your blood pressure, heart, seizure, or other essential medications the morning of your procedure?  __ Have you planned for a ride to and from procedure?  (Medical Transportation, Uber, Lyft, Taxi, etc. may ONLY be used if a responsible adult is present to accompany you home). The responsible adult CANNOT be the  of the service.   person must be available to return and pick you up within 15 minutes of being notified of discharge.           Questions or Concerns?  Please call us!  [phone] (M-F) [phone] (Nights and weekends)    Listed below are some helpful tips:    Please bring protective cases for eyewear and hearing aids-wear comfortable clothing/shoes.  Follow prep instructions closely so you don't have to do it twice.  Bowel prep is prescription used to clean out the colon before a colonoscopy. The prep increases movement of your colon by causing you to have diarrhea (loose stools). Cleaning out stool from the colon helps your doctor to see in your colon clearly during this procedure.   It is important to stay hydrated before, during and after bowel prep to prevent loss of fluid (dehydration). You can have water and your choice of clear liquids. Reminder: these liquids you will drink in addition to the bowel prep.     Preparing the mixture:    First, mix the prep with water.  Make the taste better by adding a sugar free drink mix (Crystal Light) can improve the taste of your prep.   Use a large bore (opening) straw. Place towards the back of mouth (throat) as tolerated.  Prepare a prep mixture that is lightly chilled, but not ice-cold. Drinking a large amount of ice-cold liquid can make you feel very ill.  Avoid drinking any RED beverages or eating popsicles with this color for the 24 hours leading up to your procedure. This color can look like blood in the colon.    Consuming the prep:    Take your time. If you feel ill, take a 15-minute break from drinking the prep mixture  Combat hunger and dehydration with clear liquids. Options like JELL-O, or popsicles will help.  Settle in with good reading material. The goal is to clean out 6 feet of colon, so you can plan on spending a good deal of time in the bathroom. Have some good reading material on-hand or an iPad ready to keep yourself entertained!  Keep yourself comfortable. We  recommend applying personal hygiene wipes, Tucks pads and a soothing ointment, like A&D ointment, Desitin, or Vaseline to your bottom before starting and as needed to protect your skin.

## 2024-05-18 ENCOUNTER — TELEPHONE (OUTPATIENT)
Dept: FAMILY MEDICINE | Facility: CLINIC | Age: 54
End: 2024-05-18

## 2024-05-18 RX ORDER — CIPROFLOXACIN 500 MG/1
500 TABLET ORAL 2 TIMES DAILY
Qty: 30 TABLET | Refills: 0 | Status: SHIPPED | OUTPATIENT
Start: 2024-05-18

## 2024-05-18 NOTE — TELEPHONE ENCOUNTER
Pt called with diverticulitis symptoms; hx requrign admit over 10 years ago and another episode 4 years ago  Pain lower abd with BM; no bleeding, no fever    Rx cipro 500 bid for 2 weeks    To ER for worse pain, fever, bleeding

## 2024-06-06 ENCOUNTER — TELEPHONE (OUTPATIENT)
Dept: ENDOSCOPY | Facility: HOSPITAL | Age: 54
End: 2024-06-06

## 2024-06-06 NOTE — TELEPHONE ENCOUNTER
Returning patients call to answer question about arrival time. Left message with arrival time on patients voice mail.

## 2024-06-06 NOTE — TELEPHONE ENCOUNTER
----- Message from Camryn Garcia sent at 6/6/2024  1:49 PM CDT -----    ----- Message -----  From: Lucille Rodas MA  Sent: 6/6/2024  11:43 AM CDT  To: [facility] Schedulers      ----- Message -----  From: Guicho Yu  Sent: 6/6/2024  11:33 AM CDT  To: [facility] Staff    Type:  Procedure    Who Called:pt   Does the patient know what this is regarding?:procedure time has to let ride know in advance  Would the patient rather a call back or a response via [employer]? Call   Best Call Back Number:[phone]  Additional Information:

## 2024-06-17 ENCOUNTER — TELEPHONE (OUTPATIENT)
Dept: ENDOSCOPY | Facility: HOSPITAL | Age: 54
End: 2024-06-17

## 2024-06-20 ENCOUNTER — TELEPHONE (OUTPATIENT)
Dept: ENDOSCOPY | Facility: HOSPITAL | Age: 54
End: 2024-06-20

## 2024-06-20 NOTE — TELEPHONE ENCOUNTER
Spoke to pt to confirm new arrival time and NPO instructions of 7:00AM  for Colonoscopy on 6/21/24.  Pt verbalized understanding.

## 2024-06-24 ENCOUNTER — PATIENT MESSAGE (OUTPATIENT)
Dept: GASTROENTEROLOGY | Facility: CLINIC | Age: 54
End: 2024-06-24

## 2024-11-23 DIAGNOSIS — Z12.11 SCREENING FOR COLON CANCER: ICD-10-CM

## 2024-11-23 NOTE — TELEPHONE ENCOUNTER
No care due was identified.  [facility] Embedded Care Due Messages. Reference number: [id number].   11/23/2024 10:53:18 AM CST

## 2024-11-24 RX ORDER — SILDENAFIL 100 MG/1
100 TABLET, FILM COATED ORAL DAILY PRN
Qty: 30 TABLET | Refills: 3 | Status: SHIPPED | OUTPATIENT
Start: 2024-11-24

## 2025-01-08 ENCOUNTER — OFFICE VISIT (OUTPATIENT)
Dept: FAMILY MEDICINE | Facility: CLINIC | Age: 55
End: 2025-01-08
Payer: COMMERCIAL

## 2025-01-08 VITALS
BODY MASS INDEX: 37.47 KG/M2 | HEIGHT: 72 IN | HEART RATE: 86 BPM | SYSTOLIC BLOOD PRESSURE: 138 MMHG | DIASTOLIC BLOOD PRESSURE: 84 MMHG | TEMPERATURE: 98 F | OXYGEN SATURATION: 98 % | WEIGHT: 276.69 LBS

## 2025-01-08 DIAGNOSIS — Z00.00 ROUTINE HEALTH MAINTENANCE: Primary | ICD-10-CM

## 2025-01-08 DIAGNOSIS — N52.9 ERECTILE DYSFUNCTION, UNSPECIFIED ERECTILE DYSFUNCTION TYPE: ICD-10-CM

## 2025-01-08 PROCEDURE — 3008F BODY MASS INDEX DOCD: CPT | Mod: CPTII,S$GLB,, | Performed by: FAMILY MEDICINE

## 2025-01-08 PROCEDURE — 3075F SYST BP GE 130 - 139MM HG: CPT | Mod: CPTII,S$GLB,, | Performed by: FAMILY MEDICINE

## 2025-01-08 PROCEDURE — 3079F DIAST BP 80-89 MM HG: CPT | Mod: CPTII,S$GLB,, | Performed by: FAMILY MEDICINE

## 2025-01-08 PROCEDURE — 99396 PREV VISIT EST AGE 40-64: CPT | Mod: S$GLB,,, | Performed by: FAMILY MEDICINE

## 2025-01-08 PROCEDURE — 3044F HG A1C LEVEL LT 7.0%: CPT | Mod: CPTII,S$GLB,, | Performed by: FAMILY MEDICINE

## 2025-01-08 PROCEDURE — 1159F MED LIST DOCD IN RCRD: CPT | Mod: CPTII,S$GLB,, | Performed by: FAMILY MEDICINE

## 2025-01-08 PROCEDURE — 1160F RVW MEDS BY RX/DR IN RCRD: CPT | Mod: CPTII,S$GLB,, | Performed by: FAMILY MEDICINE

## 2025-01-08 RX ORDER — HYDROCHLOROTHIAZIDE 12.5 MG/1
12.5 TABLET ORAL DAILY
Qty: 90 TABLET | Refills: 3 | Status: SHIPPED | OUTPATIENT
Start: 2025-01-08 | End: 2026-01-08

## 2025-01-08 RX ORDER — CIPROFLOXACIN 500 MG/1
500 TABLET ORAL 2 TIMES DAILY
Qty: 30 TABLET | Refills: 0 | Status: SHIPPED | OUTPATIENT
Start: 2025-01-08

## 2025-01-08 NOTE — PROGRESS NOTES
Likely tablets diet Patient ID: Frankie Hoyt is a 54 y.o. male.    Chief Complaint: Annual Exam    HPI     Frankie Hoyt is a 54 y.o. male. here for annual exam.              Review of Symptoms    Constitutional: Negative.    HENT: Negative.    Eyes: Negative.    Respiratory: Negative.    Cardiovascular: Negative.    Gastrointestinal: Negative.    Endocrine: Negative.    Genitourinary: Negative.    Musculoskeletal: Negative.    Skin: Negative.    Allergic/Immunologic: Negative.    Neurological: Negative.    Hematological: Negative.    Psychiatric/Behavioral: Negative.      Except as above in HPI    Current Outpatient Medications on File Prior to Visit   Medication Sig Dispense Refill    famotidine (PEPCID) 40 MG tablet Take 40 mg by mouth once daily.      ibuprofen (ADVIL,MOTRIN) 200 MG tablet Take 200 mg by mouth every 6 (six) hours as needed for Pain.      sildenafiL (VIAGRA) 100 MG tablet Take 1 tablet (100 mg total) by mouth daily as needed. 30 tablet 3     No current facility-administered medications on file prior to visit.         Physical  Exam    Vitals:    01/08/25 1333   BP: 138/84   BP Location: Left arm   Patient Position: Sitting   Pulse: 86   Temp: 97.8 °F (36.6 °C)   TempSrc: Oral   SpO2: 98%   Weight: 125.5 kg (276 lb 10.8 oz)   Height: 6' (1.829 m)          Constitutional:  Oriented to person, place, and time. Appears well-developed and well-nourished.     HENT:   Head: Normocephalic and atraumatic.     Right Ear: External ear normal     Left Ear: External ear normal      Nose: Nose normal. No rhinorrhea or nasal deformity.     Mouth/Throat: Moist mucus membranes      Eyes: Conjunctivae are normal. Right eye exhibits no discharge. Left eye exhibits no  discharge. No scleral icterus.     Neck:  No JVD present. No tracheal deviation  []  Neck supple.   Carotid Arteries  []  No Bruit    Cardiovascular:  Regular rate and rhythm with normal S1 and S2     Pulmonary/Chest:   Clear to auscultation  bilaterally without wheezes, rhonchi or rales    Abdominal: Soft. No distension and no mass.  No tenderness. No rebound and No guarding.     Musculoskeletal: Normal range of motion. No edema or tenderness.   No deformity     Lymphadenopathy:   []  No cervical adenopathy.  []  No inguinal adenopathy    Neurological:  Alert and oriented to person, place, and time. Coordination normal.     Skin: Skin is warm and dry. No rash noted. No bruising     Psychiatric: Normal mood and affect. Speech is normal and behavior is normal. Judgment and thought content normal.     Physical Exam                Assessment / Plan:      ICD-10-CM ICD-9-CM   1. Routine health maintenance  Z00.00 V70.0     Routine health maintenance  -     Comprehensive Metabolic Panel; Future  -     CBC Auto Differential; Future  -     Lipid Panel; Future  -     TSH; Future  -     Hemoglobin A1C; Future; Expected date: 01/08/2025        Discussed how to stay healthy     Previous cancer   Overall health  Arthritis   Reflux  Elevated blood pressure-use hydrochlorothiazide-occasional swelling of lower extremity                    Raymond Covington M.D.

## 2025-01-10 ENCOUNTER — PATIENT MESSAGE (OUTPATIENT)
Dept: FAMILY MEDICINE | Facility: CLINIC | Age: 55
End: 2025-01-10
Payer: COMMERCIAL

## 2025-07-29 DIAGNOSIS — Z12.11 SCREENING FOR COLON CANCER: ICD-10-CM

## 2025-07-29 RX ORDER — SILDENAFIL 100 MG/1
100 TABLET, FILM COATED ORAL DAILY PRN
Qty: 30 TABLET | Refills: 3 | Status: SHIPPED | OUTPATIENT
Start: 2025-07-29

## 2025-07-29 NOTE — TELEPHONE ENCOUNTER
Refill Decision Note   Frankie Lai  is requesting a refill authorization.  Brief Assessment and Rationale for Refill:  Approve     Medication Therapy Plan:        Comments:     Note composed:8:51 AM 07/29/2025

## 2025-07-29 NOTE — TELEPHONE ENCOUNTER
No care due was identified.  [facility] Embedded Care Due Messages. Reference number: [id number].   7/29/2025 8:26:11 AM CDT

## (undated) DEVICE — ELECTRODE BLADE INSULATED 1 IN

## (undated) DEVICE — CATH ALL PUR URTHL RR 10FR

## (undated) DEVICE — SUCTION COAGULATOR 10FR 6IN

## (undated) DEVICE — SPONGE TONSIL LARGE

## (undated) DEVICE — PENCIL ROCKER SWITCH 10FT CORD

## (undated) DEVICE — CONTAINER SPECIMEN STRL 4OZ

## (undated) DEVICE — KIT ANTIFOG

## (undated) DEVICE — TRAY ENT 4/CS

## (undated) DEVICE — ELECTRODE REM PLYHSV RETURN 9

## (undated) DEVICE — SHEET EENT SPLIT